# Patient Record
Sex: FEMALE | Race: WHITE | Employment: OTHER | URBAN - NONMETROPOLITAN AREA
[De-identification: names, ages, dates, MRNs, and addresses within clinical notes are randomized per-mention and may not be internally consistent; named-entity substitution may affect disease eponyms.]

---

## 2017-01-14 DIAGNOSIS — R60.0 LOCALIZED EDEMA: ICD-10-CM

## 2017-01-14 DIAGNOSIS — I10 HYPERTENSION GOAL BP (BLOOD PRESSURE) < 140/90: ICD-10-CM

## 2017-01-14 DIAGNOSIS — I48.91 ATRIAL FIBRILLATION, UNSPECIFIED TYPE (H): Primary | ICD-10-CM

## 2017-01-16 RX ORDER — RIVAROXABAN 20 MG/1
TABLET, FILM COATED ORAL
Qty: 90 TABLET | Refills: 1 | Status: SHIPPED | OUTPATIENT
Start: 2017-01-16 | End: 2017-02-06

## 2017-01-16 RX ORDER — FUROSEMIDE 20 MG
TABLET ORAL
Qty: 90 TABLET | Refills: 1 | Status: SHIPPED | OUTPATIENT
Start: 2017-01-16 | End: 2017-04-07

## 2017-01-16 NOTE — TELEPHONE ENCOUNTER
Lasix  Last Written Prescription Date: 1/20/16  Last Fill Quantity: 90, # refills: 3  Last Office Visit with [facility]  Health prescribing provider: 9/28/16   Next 5 appointments (look out 90 days)     Feb 06, 2017  3:30 PM   [facility]   [facility]    [address] [place] [address]   [phone]                   POTASSIUM   Date Value Ref Range Status   10/06/2016 4.0 3.4 - 5.3 mmol/L Final     CREATININE   Date Value Ref Range Status   10/06/2016 0.96 0.52 - 1.04 mg/dL Final     BP Readings from Last 3 Encounters:   10/06/16 111/67   09/28/16 138/74   07/08/16 134/70     Xarelto           Last Written Prescription Date: 1/20/16  Last Fill Quantity: 90, # refills: 3    Last Office Visit with [facility]  [facility] prescribing provider:  9/28/16   Future Office Visit:    Next 5 appointments (look out 90 days)     Feb 06, 2017  3:30 PM   [facility]abil [facility]   [facility]    [address] [place] [address]   [phone]                   WBC      5.4   10/6/2016  RBC     4.63   10/6/2016  HGB     14.4   10/6/2016  HCT     43.5   10/6/2016  No components found with this name: mct  MCV       94   10/6/2016  MCH     31.1   10/6/2016  MCHC     33.1   10/6/2016  RDW     13.6   10/6/2016  PLT      146   10/6/2016  AST       17   10/6/2016  ALT       24   10/6/2016  CREATININE   Date Value Ref Range Status   10/06/2016 0.96 0.52 - 1.04 mg/dL Final

## 2017-01-16 NOTE — TELEPHONE ENCOUNTER
Lasix  Prescription approved per [facility] Refill Protocol.    Xarelto  Routing refill request to provider for review/approval because:  Labs out of range:  CBC    Branide Deutsch RN  [facility]

## 2017-01-23 ENCOUNTER — TELEPHONE (OUTPATIENT)
Dept: FAMILY MEDICINE | Facility: OTHER | Age: 77
End: 2017-01-23

## 2017-01-23 DIAGNOSIS — I48.0 PAROXYSMAL ATRIAL FIBRILLATION (H): Primary | ICD-10-CM

## 2017-01-23 DIAGNOSIS — Z79.01 LONG TERM CURRENT USE OF ANTICOAGULANT THERAPY: ICD-10-CM

## 2017-01-23 RX ORDER — WARFARIN SODIUM 5 MG/1
5 TABLET ORAL DAILY
Qty: 30 TABLET | Refills: 0 | Status: SHIPPED | OUTPATIENT
Start: 2017-01-23 | End: 2017-05-09

## 2017-01-23 NOTE — TELEPHONE ENCOUNTER
Reason for Call:  Medication or medication refill:    Do you use a [facility]?  Name of the pharmacy and phone number for the current request:  [facility], phone # [phone]    Name of the medication requested: Generic for Zeralto or a substitute    Other request: Pt states she's going to [facility] now you need a 90 day prescription. Can you prescribe this? Pt states that the Zeralto is too expensive. Please call and advise. Pt has 10 left. Pt states her  orders through [facility] so you should have the address.     Can we leave a detailed message on this number? YES    Phone number patient can be reached at: Home number on file [phone] (home)    Best Time: anytime    Call taken on 1/23/2017 at 12:37 PM by Sujatha Shi

## 2017-01-23 NOTE — TELEPHONE ENCOUNTER
I spoke with Dr Tilley and he states that Elvia should go off of her Xeralto 3 days prior to starting her coumadin. I have spoken to Elvia and she will see me for a consult on Friday. I have advised her to take her last dose of Xeralto today and then we will have her start the coumadin on Friday. Patient verbalized understanding and agrees with plan of care. Pt had no further questions or concerns at this time.

## 2017-01-27 ENCOUNTER — ANTICOAGULATION THERAPY VISIT (OUTPATIENT)
Dept: ANTICOAGULATION | Facility: OTHER | Age: 77
End: 2017-01-27
Payer: COMMERCIAL

## 2017-01-27 DIAGNOSIS — Z79.01 LONG-TERM (CURRENT) USE OF ANTICOAGULANTS: ICD-10-CM

## 2017-01-27 DIAGNOSIS — Z79.01 LONG TERM CURRENT USE OF ANTICOAGULANT THERAPY: ICD-10-CM

## 2017-01-27 DIAGNOSIS — I48.91 ATRIAL FIBRILLATION, UNSPECIFIED TYPE (H): Primary | ICD-10-CM

## 2017-01-27 PROCEDURE — 99207 ZZC NO CHARGE NURSE ONLY: CPT

## 2017-01-27 RX ORDER — WARFARIN SODIUM 5 MG/1
5 TABLET ORAL DAILY
Qty: 90 TABLET | Refills: 0 | Status: SHIPPED | OUTPATIENT
Start: 2017-01-27 | End: 2017-04-06

## 2017-01-27 NOTE — MR AVS SNAPSHOT
Elvia Amaro   1/27/2017 1:00 PM   Anticoagulation Therapy Visit    Description:  76 year old female   Provider:  [facility]   Department:  [facility]           INR as of 1/27/2017     Selected INR No new INR was available at the time of this encounter.      Anticoagulation Summary as of 1/27/2017     INR goal 2.0-3.0   Selected INR No new INR was available at the time of this encounter.   Full instructions 1/27: 5 mg; 1/28: 5 mg; 1/29: 5 mg; Otherwise No maintenance plan   Next INR check 1/30/2017    Indications   Long-term (current) use of anticoagulants [Z79.01] [Z79.01]  Atrial fibrillation (H) [I48.91]         Your next Anticoagulation Clinic appointment(s)     Jan 30, 2017  9:00 AM   [facility]agulation Visit with [facility]   [facility]    [address] [facility] [address]   [phone]              Contact Numbers     Clinic Number:         January 2017 Details    Sun Mon Tue Wed Thu Fri Sat     1               2               3               4               5               6               7                 8               9               10               11               12               13               14                 15               16               17               18               19               20               21                 22               23               24               25               26               27      5 mg   See details      28      5 mg           29      5 mg         30            31                    Date Details   01/27 This INR check       Date of next INR:  1/30/2017         How to take your warfarin dose     To take:  5 mg Take 1 of the 5 mg tablets.

## 2017-01-27 NOTE — PROGRESS NOTES
ANTICOAGULATION INITIAL CLINIC VISIT    Patient Name:  Elvia Amaro  Date:  1/27/2017  Referred by: Dr Tej Tilley MD  Contact Type:  Face to Face    SUBJECTIVE:  Coumadin education was completed today.  Topics covered include:  -Introduction to coumadin  -Proper Administration  -INR Testing  -Sign/Symptoms of Bleeding  -Signs/Symptoms of Clot Formation or Stroke  -Dietary Intake of Vitamin K  -Drug Interactions  -Anticoagulation Identification (bracelet, necklace or wallet card)  -Future Surgery  -Effects of Alcohol, Tobacco, and Exercise on Coumadin    Coumadin Education Booklet and Coumadin Identification Wallet Card were given to the patient.       Patient Findings     Positives Initiation of therapy    Comments Education given. Pt stopped her Xeralto on Monday night and will start Coumadin today and we will check her INR on Monday.          OBJECTIVE    INR   Date Value Ref Range Status   12/02/2015 1.18* 0.86 - 1.14 Final       ASSESSMENT / PLAN  INR assessment SUB    Recheck INR In: 3 DAYS    INR Location Clinic      Anticoagulation Summary as of 1/27/2017     INR goal 2.0-3.0   Selected INR No new INR was available at the time of this encounter.   Maintenance plan No maintenance plan   Full instructions 1/27: 5 mg; 1/28: 5 mg; 1/29: 5 mg; Otherwise No maintenance plan   Next INR check 1/30/2017   Target end date     Indications   Long-term (current) use of anticoagulants [Z79.01] [Z79.01]  Atrial fibrillation (H) [I48.91]         Anticoagulation Episode Summary     INR check location     Preferred lab     Send INR reminders to [facility]    Comments       Anticoagulation Care Providers     Provider Role Specialty Phone number    Tej Tilley MD [facility] [phone]            See the Encounter Report to view Anticoagulation Flowsheet and Dosing Calendar (Go to Encounters tab in chart review, and find the Anticoagulation Therapy Visit)    Dosage adjustment made based on  physician directed care plan.    Campos Carrera RN

## 2017-01-28 DIAGNOSIS — I48.91 ATRIAL FIBRILLATION, UNSPECIFIED TYPE (H): Primary | ICD-10-CM

## 2017-01-30 ENCOUNTER — ANTICOAGULATION THERAPY VISIT (OUTPATIENT)
Dept: ANTICOAGULATION | Facility: OTHER | Age: 77
End: 2017-01-30
Payer: COMMERCIAL

## 2017-01-30 DIAGNOSIS — I48.91 ATRIAL FIBRILLATION, UNSPECIFIED TYPE (H): ICD-10-CM

## 2017-01-30 DIAGNOSIS — Z79.01 LONG-TERM (CURRENT) USE OF ANTICOAGULANTS: Primary | ICD-10-CM

## 2017-01-30 LAB — INR POINT OF CARE: 1.7 (ref 0.86–1.14)

## 2017-01-30 PROCEDURE — 85610 PROTHROMBIN TIME: CPT | Mod: QW

## 2017-01-30 PROCEDURE — 99207 ZZC NO CHARGE NURSE ONLY: CPT

## 2017-01-30 PROCEDURE — 36416 COLLJ CAPILLARY BLOOD SPEC: CPT

## 2017-01-30 NOTE — MR AVS SNAPSHOT
Elvia Aamro   1/30/2017 9:00 AM   Anticoagulation Therapy Visit    Description:  76 year old female   Provider:  [facility]   Department:  [facility]           INR as of 1/30/2017     Selected INR 1.7! (1/30/2017)      Anticoagulation Summary as of 1/30/2017     INR goal 2.0-3.0   Selected INR 1.7! (1/30/2017)   Full instructions 1/30: 2.5 mg; 1/31: 2.5 mg; 2/1: 2.5 mg; Otherwise No maintenance plan   Next INR check 2/2/2017    Indications   Long-term (current) use of anticoagulants [Z79.01] [Z79.01]  Atrial fibrillation (H) [I48.91]         Your next Anticoagulation Clinic appointment(s)     Feb 02, 2017  8:45 AM   [facility]agulation Visit with [facility]   [facility]    [address] [facility] [address]   [phone]              Contact Numbers     Clinic Number:         January 2017 Details    Sun Mon Tue Wed Thu Fri Sat     1               2               3               4               5               6               7                 8               9               10               11               12               13               14                 15               16               17               18               19               20               21                 22               23               24               25               26               27               28                 29               30      2.5 mg   See details      31      2.5 mg              Date Details   01/30 This INR check               How to take your warfarin dose     To take:  2.5 mg Take 0.5 of a 5 mg tablet.           February 2017 Details    Sun Mon Tue Wed Thu Fri Sat        1      2.5 mg         2            3               4                 5               6               7               8               9               10               11                 12               13               14               15               16               17               18                  19               20               21               22               23               24               25                 26               27               28                    Date Details   No additional details    Date of next INR:  2/2/2017         How to take your warfarin dose     To take:  2.5 mg Take 0.5 of a 5 mg tablet.

## 2017-01-30 NOTE — PROGRESS NOTES
ANTICOAGULATION FOLLOW-UP CLINIC VISIT    Patient Name:  Elvia Amaro  Date:  1/30/2017  Contact Type:  Face to Face    SUBJECTIVE:     Patient Findings     Positives Initiation of therapy           OBJECTIVE    INR PROTIME   Date Value Ref Range Status   01/30/2017 1.7* 0.86 - 1.14 Final       ASSESSMENT / PLAN  INR assessment SUB    Recheck INR In: 3 DAYS    INR Location Clinic      Anticoagulation Summary as of 1/30/2017     INR goal 2.0-3.0   Selected INR 1.7! (1/30/2017)   Maintenance plan No maintenance plan   Full instructions 1/30: 2.5 mg; 1/31: 2.5 mg; 2/1: 2.5 mg; Otherwise No maintenance plan   Next INR check 2/2/2017   Target end date     Indications   Long-term (current) use of anticoagulants [Z79.01] [Z79.01]  Atrial fibrillation (H) [I48.91]         Anticoagulation Episode Summary     INR check location     Preferred lab     Send INR reminders to [facility]    Comments       Anticoagulation Care Providers     Provider Role Specialty Phone number    Tej Tilley MD [facility] [phone]            See the Encounter Report to view Anticoagulation Flowsheet and Dosing Calendar (Go to Encounters tab in chart review, and find the Anticoagulation Therapy Visit)    Dosage adjustment made based on physician directed care plan.    Campos Carrera RN

## 2017-02-01 RX ORDER — AMIODARONE HYDROCHLORIDE 200 MG/1
TABLET ORAL
Qty: 90 TABLET | Refills: 1 | Status: SHIPPED | OUTPATIENT
Start: 2017-02-01 | End: 2017-02-06

## 2017-02-02 ENCOUNTER — ANTICOAGULATION THERAPY VISIT (OUTPATIENT)
Dept: ANTICOAGULATION | Facility: OTHER | Age: 77
End: 2017-02-02
Payer: COMMERCIAL

## 2017-02-02 DIAGNOSIS — Z79.01 LONG-TERM (CURRENT) USE OF ANTICOAGULANTS: Primary | ICD-10-CM

## 2017-02-02 DIAGNOSIS — I48.91 ATRIAL FIBRILLATION, UNSPECIFIED TYPE (H): ICD-10-CM

## 2017-02-02 LAB — INR POINT OF CARE: 2.3 (ref 0.86–1.14)

## 2017-02-02 PROCEDURE — 99207 ZZC NO CHARGE NURSE ONLY: CPT

## 2017-02-02 PROCEDURE — 36416 COLLJ CAPILLARY BLOOD SPEC: CPT

## 2017-02-02 PROCEDURE — 85610 PROTHROMBIN TIME: CPT | Mod: QW

## 2017-02-02 NOTE — PROGRESS NOTES
ANTICOAGULATION FOLLOW-UP CLINIC VISIT    Patient Name:  Elvia Amaro  Date:  2/2/2017  Contact Type:  Face to Face    SUBJECTIVE:     Patient Findings     Positives Initiation of therapy, No Problem Findings           OBJECTIVE    INR PROTIME   Date Value Ref Range Status   02/02/2017 2.3* 0.86 - 1.14 Final       ASSESSMENT / PLAN  INR assessment THER    Recheck INR In: 4 DAYS    INR Location Clinic      Anticoagulation Summary as of 2/2/2017     INR goal 2.0-3.0   Selected INR 2.3 (2/2/2017)   Maintenance plan No maintenance plan   Full instructions 2/2: 2.5 mg; 2/3: 5 mg; 2/4: 2.5 mg; 2/5: 5 mg; Otherwise No maintenance plan   Next INR check 2/6/2017   Target end date     Indications   Long-term (current) use of anticoagulants [Z79.01] [Z79.01]  Atrial fibrillation (H) [I48.91]         Anticoagulation Episode Summary     INR check location     Preferred lab     Send INR reminders to [facility]    Comments       Anticoagulation Care Providers     Provider Role Specialty Phone number    Tej Tilley MD [facility] [phone]            See the Encounter Report to view Anticoagulation Flowsheet and Dosing Calendar (Go to Encounters tab in chart review, and find the Anticoagulation Therapy Visit)    Dosage adjustment made based on physician directed care plan.    Tong Parker RN

## 2017-02-02 NOTE — MR AVS SNAPSHOT
Elvia Amaro   2/2/2017 8:45 AM   Anticoagulation Therapy Visit    Description:  76 year old female   Provider:  [facility]   Department:  [facility]           INR as of 2/2/2017     Selected INR 2.3 (2/2/2017)      Anticoagulation Summary as of 2/2/2017     INR goal 2.0-3.0   Selected INR 2.3 (2/2/2017)   Full instructions 2/2: 2.5 mg; 2/3: 5 mg; 2/4: 2.5 mg; 2/5: 5 mg; Otherwise No maintenance plan   Next INR check 2/6/2017    Indications   Long-term (current) use of anticoagulants [Z79.01] [Z79.01]  Atrial fibrillation (H) [I48.91]         Your next Anticoagulation Clinic appointment(s)     Feb 06, 2017  9:15 AM   Anticoagulation Visit with [facility]   [facility]    [address] [facility] [address]   [phone]              Contact Numbers     Clinic Number:         February 2017 Details    Sun Mon Tue Wed Thu Fri Sat        1               2      2.5 mg   See details      3      5 mg         4      2.5 mg           5      5 mg         6            7               8               9               10               11                 12               13               14               15               16               17               18                 19               20               21               22               23               24               25                 26               27               28                    Date Details   02/02 This INR check       Date of next INR:  2/6/2017         How to take your warfarin dose     To take:  2.5 mg Take 0.5 of a 5 mg tablet.    To take:  5 mg Take 1 of the 5 mg tablets.

## 2017-02-06 ENCOUNTER — OFFICE VISIT (OUTPATIENT)
Dept: CARDIOLOGY | Facility: CLINIC | Age: 77
End: 2017-02-06
Payer: COMMERCIAL

## 2017-02-06 ENCOUNTER — ANTICOAGULATION THERAPY VISIT (OUTPATIENT)
Dept: ANTICOAGULATION | Facility: OTHER | Age: 77
End: 2017-02-06
Payer: COMMERCIAL

## 2017-02-06 VITALS
HEART RATE: 64 BPM | SYSTOLIC BLOOD PRESSURE: 146 MMHG | HEIGHT: 66 IN | WEIGHT: 228.1 LBS | OXYGEN SATURATION: 94 % | BODY MASS INDEX: 36.66 KG/M2 | DIASTOLIC BLOOD PRESSURE: 82 MMHG

## 2017-02-06 DIAGNOSIS — Z79.01 LONG-TERM (CURRENT) USE OF ANTICOAGULANTS: Primary | ICD-10-CM

## 2017-02-06 DIAGNOSIS — I48.91 ATRIAL FIBRILLATION, UNSPECIFIED TYPE (H): Primary | ICD-10-CM

## 2017-02-06 DIAGNOSIS — Z79.899 ON AMIODARONE THERAPY: ICD-10-CM

## 2017-02-06 DIAGNOSIS — I42.9 CARDIOMYOPATHY (H): ICD-10-CM

## 2017-02-06 DIAGNOSIS — I48.91 ATRIAL FIBRILLATION, UNSPECIFIED TYPE (H): ICD-10-CM

## 2017-02-06 LAB — INR POINT OF CARE: 3.5 (ref 0.86–1.14)

## 2017-02-06 PROCEDURE — 36416 COLLJ CAPILLARY BLOOD SPEC: CPT

## 2017-02-06 PROCEDURE — 99214 OFFICE O/P EST MOD 30 MIN: CPT | Performed by: INTERNAL MEDICINE

## 2017-02-06 PROCEDURE — 99207 ZZC NO CHARGE NURSE ONLY: CPT

## 2017-02-06 PROCEDURE — 85610 PROTHROMBIN TIME: CPT | Mod: QW

## 2017-02-06 RX ORDER — AMIODARONE HYDROCHLORIDE 200 MG/1
TABLET ORAL
Qty: 90 TABLET | Refills: 3 | Status: SHIPPED | OUTPATIENT
Start: 2017-02-06 | End: 2018-03-05

## 2017-02-06 RX ORDER — CARVEDILOL 12.5 MG/1
TABLET ORAL
Qty: 180 TABLET | Refills: 3 | Status: SHIPPED | OUTPATIENT
Start: 2017-02-06 | End: 2017-11-29

## 2017-02-06 NOTE — PROGRESS NOTES
"Chief Complaint: atrial fibrillation     HPI ([id number]): I was happy to see Mrs. Amaro in consultation at the request of Dr. Tilley for the above problem.    Patient reports that she was first diagnosed with atrial fibrillation recently but has been feeling fatigue and short of breath since mid-July. Prior to that she reprost she as \"fine\". She does not have palpitations. She noticed a marked and sudden increase in dyspnea on exertion. She also noted increased ankle edema.    Her chads score is 4 (2 for age, one for gender, one for hypertension).    She has been started on rivaroxiban for thromboembolic prophylaxis.    Her echocardiogram shows moderate left ventricular systolic dysfunction.  She was started on Coreg for rate control as well as her systolic dysfunction. She was also started on furosemide. She is not yet on an ACE inhibitor. She does not feel the medications have helped very much.    She reports no FH of atrial fibrillation.    [id number]: She felt better for a few days after the cardioversion but then her dyspnea on exertion, shortness of breath and fatigue recurred. She is back in atrial fibrillation today.     [id number]: She has completed her oral amiodarone loading and is scheduled for direct current cardioversion later this morning. She reports taking her rivaroxaban with a meal and has not missed any doses. She reports no changes. She still feels that her shortness of breath is more noticeable than usual. Her ECG shows she is back in sinus rhythm.     [id number]: She reports feeling well. She has not had any palpitations. She does not want to wear another monitor. She reports no bleeding problems on the rivaroxaban. She has not had any focal neurologic symptoms.    [id number] Interval history: She has had no had any atrial fibrillation that she is aware of. She had to switch from rivaroxaban to warfarin due to cost. She has had no bleeding problems. She denies any focal neurologic symptoms. "     Current Outpatient Prescriptions   Medication Sig Dispense Refill     amiodarone (PACERONE/CODARONE) 200 MG tablet TAKE 1 TABLET (200 MG) BY MOUTH DAILY 90 tablet 3     carvedilol (COREG) 12.5 MG tablet TAKE 1 TABLET BY MOUTH TWIC E A DAY WITH MEALS 180 tablet 3     warfarin (COUMADIN) 5 MG tablet Take 1 tablet (5 mg) by mouth daily 90 tablet 0     warfarin (COUMADIN) 5 MG tablet Take 1 tablet (5 mg) by mouth daily 30 tablet 0     furosemide (LASIX) 20 MG tablet TAKE 1 TABLET BY MOUTH EVER Y DAY 90 tablet 1     levothyroxine (SYNTHROID/LEVOTHROID) 100 MCG tablet TAKE 1 TABLET BY MOUTH EVER Y DAY 90 tablet 0     meloxicam (MOBIC) 7.5 MG tablet TAKE 1 TABLET BY MOUTH EVER Y DAY 90 tablet 3     cetirizine (ZYRTEC) 10 MG tablet Take 1 tablet (10 mg) by mouth daily 30 tablet 0     order for DME Equipment being ordered: Elastic Sleeve   Prosthetics Fax [phone] 1 Device 1     Calcium Carbonate-Vitamin D (CALCIUM + D PO) Take  by mouth.       Cholecalciferol (VITAMIN D-1000 MAX ST PO) Take  by mouth.       [DISCONTINUED] amiodarone (PACERONE/CODARONE) 200 MG tablet TAKE 1 TABLET (200 MG) BY MOUTH DAILY 90 tablet 1     [DISCONTINUED] carvedilol (COREG) 12.5 MG tablet TAKE 1 TABLET BY MOUTH TWIC E A DAY WITH MEALS 180 tablet 1     order for DME Equipment being ordered: breast prosthesis and bras 1 Device 0     ACE/ARB NOT PRESCRIBED, INTENTIONAL, 1 each every 15 minutes ACE & ARB not prescribed due to Symptomatic hypotension not due to excessive diuresis         Past Medical History   Diagnosis Date     NONSPECIFIC MEDICAL HISTORY      no active problems     Malignant neoplasm (H)      breast     Hypertension goal BP (blood pressure) < 140/90 3/14/2011     Basal cell carcinoma        Past Surgical History   Procedure Laterality Date     Hc removal of tonsils,<11 y/o       Tonsils <10y.o.     Hc hysteroscopy w endometrial bx/polypectomy w/wo d&c  9/30/2005     Hysteroscopy. D&C. Cervical biopsies.     Mastectomy  " 3/21/11     left mastectomy, lymph node dissection     Laparoscopic hysterectomy total, bilateral salpingo-oophorectomy, combined  3/21/11     Vascular surgery       port     Colonoscopy  8/29/2011     Procedure:COLONOSCOPY; colonoscopy; Surgeon:VICKIE DUFF; Location:[facility]     Biopsy skin (location)       left leg     Remove catheter vascular access  5/4/2011     Procedure:REMOVE CATHETER VASCULAR ACCESS; right subclavian; Surgeon:ESTER ROPER; Location:[facility]     Breast surgery       No history of surgery       Mohs micrographic procedure       Anesthesia cardioversion N/A 9/18/2015     Procedure: ANESTHESIA CARDIOVERSION;  Surgeon: GENERIC ANESTHESIA PROVIDER;  Location: [facility]       Family History   Problem Relation Age of Onset     DIABETES Father      CEREBROVASCULAR DISEASE Father      HEART DISEASE Mother      MI age 68     CANCER No family hx of      no skin cancer       Social History   Substance Use Topics     Smoking status: Never Smoker      Smokeless tobacco: Never Used     Alcohol Use: No     She is  and lives with her . She is retired. She worker as a HS principal's .      Allergies   Allergen Reactions     Bacitracin      Skin Irritation     Cephalexin Hcl Itching     Clindamycin Hcl Itching       ROS: ten system review as in HPI and use of compression sleeve on left arm s/p mastectomy and RT to left chest and axilla. [id number]      Physical Examination:  Vitals: /82 mmHg  Pulse 64  Ht 1.664 m (5' 5.5\")  Wt 103.465 kg (228 lb 1.6 oz)  BMI 37.37 kg/m2  SpO2 94%  BMI= Body mass index is 37.37 kg/(m^2).    GENERAL APPEARANCE: healthy, alert and no distress  HEENT: no icterus, mucosa moist, no cyanosis.  NECK: JVP is difficult to see    CHEST: lungs clear to auscultation, respirations are unlabored, normal respiratory rate  CARDIOVASCULAR: regular rhythm, normal S1,S2 is single, heart sounds are distant, no S3 or S4 and no murmur, click or rub, precordium quiet, " post-surgical changes left chest.  EXTREMITIES: no clubbing or cyanosis, mild pitting edema half way up shins      Laboratory:     Results for CISCO SUMNER (MRN [id number]) as of 2017 15:32   Ref. Range 10/6/2016 12:24   ALT Latest Ref Range: 0-50 U/L 24   AST Latest Ref Range: 0-45 U/L 17     Results for CISCO SUMNER (MRN [id number]) as of 2017 15:32   Ref. Range 2016 14:48   T4 Free Latest Ref Range: 0.76-1.46 ng/dL 1.49 (H)         Previous results:  Results for CISCO SUMNER (MRN [id number]) as of 2015 09:13   Ref. Range 2015 08:23   Sodium Latest Range: 133-144 mmol/L 141   Potassium Latest Range: 3.4-5.3 mmol/L 4.2   Chloride Latest Range:  mmol/L 107   Carbon Dioxide Latest Range: 20-32 mmol/L 29   Urea Nitrogen Latest Range: 7-30 mg/dL 18   Creatinine Latest Range: 0.52-1.04 mg/dL 0.99   GFR Estimate Latest Range: >60 mL/min/1.7m2 54 (L)   GFR Estimate If Black Latest Range: >60 mL/min/1.7m2 66   Calcium Latest Range: 8.5-10.1 mg/dL 9.0   Anion Gap Latest Range: 3-14 mmol/L 5   Magnesium Latest Range: 1.6-2.3 mg/dL 2.2   Albumin Latest Range: 3.4-5.0 g/dL 3.6   Protein Total Latest Range: 6.8-8.8 g/dL 6.8   Bilirubin Total Latest Range: 0.2-1.3 mg/dL 0.6   Alkaline Phosphatase Latest Range:  U/L 103   ALT Latest Range: 0-50 U/L 27   AST Latest Range: 0-45 U/L 19   Glucose Latest Range: 60-99 mg/dL 95   INR Latest Range: 0.86-1.14  1.18 (H)       Previous studies:  [facility]  Echocardiography Laboratory  [address] [place] [address]      Name: CISCO SUMNER  MRN: [id number]  : 1940  Study Date: 2015 11:11 AM  Age: 75 yrs  Gender: Female  Patient Location: [facility]  Reason For Study: Atrial fibrillation- RVR  History: HTN,Breast Cancer  Ordering Physician: JB DIAZ  Referring Physician: JB DIAZ  Performed By: Mona Reyes    BSA: 2.1 m2  Height: 65 in  Weight: 222 lb  HR: 120  BP:  132/84 mmHg  ______________________________________________________________________________      Procedure  Complete Echo Adult. Contrast Optison.  ______________________________________________________________________________    Interpretation Summary    The left ventricle is normal in size.  There is mild-moderate global hypokinesia of the left ventricle.  The visual ejection fraction is estimated at 40-45%.  There is mild concentric left ventricular hypertrophy.  There is mild (1+) mitral regurgitation.  There is mild (1+) tricuspid regurgitation.  The rhythm was rapid atrial fibrillation.  ______________________________________________________________________________        Left Ventricle  The left ventricle is normal in size. There is mild concentric left  ventricular hypertrophy. The visual ejection fraction is estimated at 40-45%.  There is mild-moderate global hypokinesia of the left ventricle.    Right Ventricle  The right ventricle is normal in size and function.  Atria  The left atrium is moderately dilated. The right atrium is mildly dilated.    Mitral Valve  There is mild (1+) mitral regurgitation.    Tricuspid Valve  There is mild (1+) tricuspid regurgitation. Right ventricle systolic pressure  estimate normal. The right ventricular systolic pressure is approximated at  23.7 mmHg plus the right atrial pressure.    Aortic Valve  Normal tricuspid aortic valve. No aortic regurgitation is present. No aortic  stenosis is present.    Pulmonic Valve  There is trace pulmonic valvular regurgitation.    Vessels  Normal size aorta. The aortic root is normal size.  Pericardial/Pleural  There is no pericardial effusion.    Rhythm  The rhythm was rapid atrial fibrillation.    ______________________________________________________________________________  MMode/2D Measurements & Calculations  IVSd: 1.3 cm  LVIDd: 3.8 cm  LVIDs: 3.0 cm  LVPWd: 1.2 cm  FS: 20.2 %  EDV(Teich): 60.1 ml  ESV(Teich): 34.9 ml  LV mass(C)d:  166.1 grams  Ao root diam: 3.3 cm  LA dimension: 3.8 cm  LA/Ao: 1.2  LA Volume (BP): 71.9 ml    LA Volume Index (BP): 34.7 ml/m2      Doppler Measurements & Calculations  MV E max naheed: 112.0 cm/sec  TR max naheed: 242.4 cm/sec  TR max P.7 mmHg  Lateral E/e': 13.9  Medial E/e': 20.0    ______________________________________________________________________________      Report approved by: Zoraida Chandler 2015 02:02 PM      Assessment and recommendations:    1) Persistent atrial fibrillation, recurrent- maintaining sinus    - amio labs ordered      2) Left ventricular systolic dysfunction - mild ankle edema but no other physical exam signs of heart failure. She reports no change in her exercise ability. She does her own housework and exercises 3x/week      - unable to increase meds due to low BP  - continue carvedilol  - follow-up echocardiogram ordered    3) Sinus bradycardia - no complaints of exercise intolerance, will follow clinically    I appreciate the chance to help with Mrs. Amaro's care. Please let me know if you have any questions or concerns.    Nabil Kerr MD    CC Tej Tilley (General)

## 2017-02-06 NOTE — PROGRESS NOTES
ANTICOAGULATION FOLLOW-UP CLINIC VISIT    Patient Name:  Elvia Amaro  Date:  2/6/2017  Contact Type:  Face to Face    SUBJECTIVE:     Patient Findings     Positives Initiation of therapy, No Problem Findings    Comments Pt has been limiting greens in diet, planning to increase greens before next INR check.           OBJECTIVE    INR PROTIME   Date Value Ref Range Status   02/06/2017 3.5* 0.86 - 1.14 Final       ASSESSMENT / PLAN  INR assessment SUPRA    Recheck INR In: 4 DAYS    INR Location Clinic      Anticoagulation Summary as of 2/6/2017     INR goal 2.0-3.0   Selected INR 3.5! (2/6/2017)   Maintenance plan No maintenance plan   Full instructions 2/6: 2.5 mg; 2/7: 2.5 mg; 2/8: 2.5 mg; 2/9: 2.5 mg; Otherwise No maintenance plan   Next INR check 2/10/2017   Target end date     Indications   Long-term (current) use of anticoagulants [Z79.01] [Z79.01]  Atrial fibrillation (H) [I48.91]         Anticoagulation Episode Summary     INR check location     Preferred lab     Send INR reminders to [facility]    Comments       Anticoagulation Care Providers     Provider Role Specialty Phone number    Tej Tilley MD [facility] [phone]            See the Encounter Report to view Anticoagulation Flowsheet and Dosing Calendar (Go to Encounters tab in chart review, and find the Anticoagulation Therapy Visit)    Dosage adjustment made based on physician directed care plan.    Tong Parker RN

## 2017-02-06 NOTE — MR AVS SNAPSHOT
After Visit Summary   2/6/2017    Elvia Amaro    MRN: [id number]           Patient Information     Date Of Birth          1940        Visit Information        Provider Department      2/6/2017 3:30 PM Nabil Kerr MD [facility]        Today's Diagnoses     Atrial fibrillation, unspecified type (H)    -  1     Cardiomyopathy (H)         On amiodarone therapy            Follow-ups after your visit        Additional Services     Follow-Up with Cardiac Advanced Practice Provider           Follow-Up with Electrophysiologist                 Your next 10 appointments already scheduled     Feb 10, 2017  9:00 AM   [facility]ticoagulation [facility] with  [facility]   [facility]    [address] [facility] [id number] [phone]            Apr 06, 2017 12:00 PM   Lab with  LAB   [facility] ([facility])    [address]  1st [address]  [facility] [id number] [phone]            Apr 06, 2017 12:30 PM   (Arrive by 12:15 PM)   Return Visit with Elijah Padilla MD   [facility] ([facility])    [address]  [address]nd [address]  [facility] [id number] [phone]              Future tests that were ordered for you today     Open Future Orders        Priority Expected Expires Ordered    Echocardiogram Routine 2/13/2017 2/6/2018 2/6/2017    Follow-Up with Electrophysiologist Routine 2/6/2018 6/21/2018 2/6/2017    Follow-Up with Cardiac Advanced Practice Provider Routine 8/5/2017 2/6/2018 2/6/2017    Basic metabolic panel Routine 8/5/2017 2/6/2018 2/6/2017    Pulmonary function test Routine 8/5/2017 2/6/2018 2/6/2017    XR Chest 1 View Routine 8/5/2017 2/6/2018 2/6/2017    Hepatic panel Routine 8/5/2017 2/6/2018 2/6/2017    TSH Routine 8/5/2017 2/6/2018 2/6/2017            Who to contact     If you have questions or need follow up information about today's  "clinic visit or your schedule please contact [facility] directly at [phone].  Normal or non-critical lab and imaging results will be communicated to you by MyChart, letter or phone within 4 business days after the clinic has received the results. If you do not hear from us within 7 days, please contact the clinic through [employer] or phone. If you have a critical or abnormal lab result, we will notify you by phone as soon as possible.  Submit refill requests through [facility] or call your pharmacy and they will forward the refill request to us. Please allow 3 business days for your refill to be completed.          Additional Information About Your Visit        MyChart Information     [facility]dr lets you send messages to your doctor, view your test results, renew your prescriptions, schedule appointments and more. To sign up, go to [email or web address] . Click on \"Log in\" on the left side of the screen, which will take you to the Welcome page. Then click on \"Sign up Now\" on the right side of the page.     You will be asked to enter the access code listed below, as well as some personal information. Please follow the directions to create your username and password.     Your access code is: [email or web address]  Expires: 2017  3:56 PM     Your access code will  in 90 days. If you need help or a new code, please call your [place] clinic or [phone].        Care EveryWhere ID     This is your Care EveryWhere ID. This could be used by other organizations to access your [place] medical records  [id number]        Your Vitals Were     Pulse Height BMI (Body Mass Index) Pulse Oximetry          64 1.664 m (5' 5.5\") 37.37 kg/m2 94%         Blood Pressure from Last 3 Encounters:   17 146/82   10/06/16 111/67   16 138/74    Weight from Last 3 Encounters:   17 103.465 kg (228 lb 1.6 oz)   10/06/16 105.008 kg (231 lb 8 oz)   16 104.736 kg (230 lb 14.4 oz)                 Today's " Medication Changes          These changes are accurate as of: 2/6/17  3:56 PM.  If you have any questions, ask your nurse or doctor.               These medicines have changed or have updated prescriptions.        Dose/Directions    amiodarone 200 MG tablet   Commonly known as:  PACERONE/CODARONE   This may have changed:  See the new instructions.   Used for:  Atrial fibrillation, unspecified type (H)   Changed by:  Nabil Kerr MD        TAKE 1 TABLET (200 MG) BY MOUTH DAILY   Quantity:  90 tablet   Refills:  3       carvedilol 12.5 MG tablet   Commonly known as:  COREG   This may have changed:  See the new instructions.   Used for:  Cardiomyopathy (H)   Changed by:  Nabil Kerr MD        TAKE 1 TABLET BY MOUTH TWIC E A DAY WITH MEALS   Quantity:  180 tablet   Refills:  3         Stop taking these medicines if you haven't already. Please contact your care team if you have questions.     XARELTO 20 MG Tabs tablet   Generic drug:  rivaroxaban ANTICOAGULANT   Stopped by:  Nabil Kerr MD                Where to get your medicines      These medications were sent to [facility] - [facility] [address]  [address], [facility] [address]     Phone:  [phone]    - amiodarone 200 MG tablet  - carvedilol 12.5 MG tablet             Primary Care Provider Office Phone # Fax #    Tej Tilley [phone]       [facility] [address] [facility] [address]        Thank you!     Thank you for choosing [facility]  for your care. Our goal is always to provide you with excellent care. Hearing back from our patients is one way we can continue to improve our services. Please take a few minutes to complete the written survey that you may receive in the mail after your visit with us. Thank you!             Your Updated Medication List - Protect others around you: Learn how to safely use, store and throw away your medicines  at www.disposemymeds.org.          This list is accurate as of: 2/6/17  3:56 PM.  Always use your most recent med list.                   Brand Name Dispense Instructions for use    ACE/ARB NOT PRESCRIBED (INTENTIONAL)      1 each every 15 minutes ACE & ARB not prescribed due to Symptomatic hypotension not due to excessive diuresis       amiodarone 200 MG tablet    PACERONE/CODARONE    90 tablet    TAKE 1 TABLET (200 MG) BY MOUTH DAILY       CALCIUM + D PO      Take  by mouth.       carvedilol 12.5 MG tablet    COREG    180 tablet    TAKE 1 TABLET BY MOUTH TWIC E A DAY WITH MEALS       cetirizine 10 MG tablet    zyrTEC    30 tablet    Take 1 tablet (10 mg) by mouth daily       furosemide 20 MG tablet    LASIX    90 tablet    TAKE 1 TABLET BY MOUTH EVER Y DAY       levothyroxine 100 MCG tablet    SYNTHROID/LEVOTHROID    90 tablet    TAKE 1 TABLET BY MOUTH EVER Y DAY       meloxicam 7.5 MG tablet    MOBIC    90 tablet    TAKE 1 TABLET BY MOUTH EVER Y DAY       * order for DME     1 Device    Equipment being ordered: Elastic Sleeve  Prosthetics Fax [phone]       * order for DME     1 Device    Equipment being ordered: breast prosthesis and bras       VITAMIN D-1000 MAX ST PO      Take  by mouth.       * warfarin 5 MG tablet    COUMADIN    30 tablet    Take 1 tablet (5 mg) by mouth daily       * warfarin 5 MG tablet    COUMADIN    90 tablet    Take 1 tablet (5 mg) by mouth daily       * Notice:  This list has 4 medication(s) that are the same as other medications prescribed for you. Read the directions carefully, and ask your doctor or other care provider to review them with you.

## 2017-02-06 NOTE — Clinical Note
"2/6/2017      RE: Elvia Amaro  [address] [facility] [address]       Dear Colleague,    Thank you for the opportunity to participate in the care of your patient, Elvia Amaro, at the [facility] at [facility]. Please see a copy of my visit note below.    Chief Complaint: atrial fibrillation     HPI ([id number]): I was happy to see Mrs. Amaro in consultation at the request of Dr. Tilley for the above problem.    Patient reports that she was first diagnosed with atrial fibrillation recently but has been feeling fatigue and short of breath since mid-July. Prior to that she reprost she as \"fine\". She does not have palpitations. She noticed a marked and sudden increase in dyspnea on exertion. She also noted increased ankle edema.    Her chads score is 4 (2 for age, one for gender, one for hypertension).    She has been started on rivaroxiban for thromboembolic prophylaxis.    Her echocardiogram shows moderate left ventricular systolic dysfunction.  She was started on Coreg for rate control as well as her systolic dysfunction. She was also started on furosemide. She is not yet on an ACE inhibitor. She does not feel the medications have helped very much.    She reports no FH of atrial fibrillation.    [id number]: She felt better for a few days after the cardioversion but then her dyspnea on exertion, shortness of breath and fatigue recurred. She is back in atrial fibrillation today.     [id number]: She has completed her oral amiodarone loading and is scheduled for direct current cardioversion later this morning. She reports taking her rivaroxaban with a meal and has not missed any doses. She reports no changes. She still feels that her shortness of breath is more noticeable than usual. Her ECG shows she is back in sinus rhythm.     [id number]: She reports feeling well. She has not had any palpitations. She does not want to wear another monitor. She reports " no bleeding problems on the rivaroxaban. She has not had any focal neurologic symptoms.    [id number] Interval history: She has had no had any atrial fibrillation that she is aware of. She had to switch from rivaroxaban to warfarin due to cost. She has had no bleeding problems. She denies any focal neurologic symptoms.     Current Outpatient Prescriptions   Medication Sig Dispense Refill     amiodarone (PACERONE/CODARONE) 200 MG tablet TAKE 1 TABLET (200 MG) BY MOUTH DAILY 90 tablet 3     carvedilol (COREG) 12.5 MG tablet TAKE 1 TABLET BY MOUTH TWIC E A DAY WITH MEALS 180 tablet 3     warfarin (COUMADIN) 5 MG tablet Take 1 tablet (5 mg) by mouth daily 90 tablet 0     warfarin (COUMADIN) 5 MG tablet Take 1 tablet (5 mg) by mouth daily 30 tablet 0     furosemide (LASIX) 20 MG tablet TAKE 1 TABLET BY MOUTH EVER Y DAY 90 tablet 1     levothyroxine (SYNTHROID/LEVOTHROID) 100 MCG tablet TAKE 1 TABLET BY MOUTH EVER Y DAY 90 tablet 0     meloxicam (MOBIC) 7.5 MG tablet TAKE 1 TABLET BY MOUTH EVER Y DAY 90 tablet 3     cetirizine (ZYRTEC) 10 MG tablet Take 1 tablet (10 mg) by mouth daily 30 tablet 0     order for DME Equipment being ordered: Elastic Sleeve   Prosthetics Fax [phone] 1 Device 1     Calcium Carbonate-Vitamin D (CALCIUM + D PO) Take  by mouth.       Cholecalciferol (VITAMIN D-1000 MAX ST PO) Take  by mouth.       [DISCONTINUED] amiodarone (PACERONE/CODARONE) 200 MG tablet TAKE 1 TABLET (200 MG) BY MOUTH DAILY 90 tablet 1     [DISCONTINUED] carvedilol (COREG) 12.5 MG tablet TAKE 1 TABLET BY MOUTH TWIC E A DAY WITH MEALS 180 tablet 1     order for DME Equipment being ordered: breast prosthesis and bras 1 Device 0     ACE/ARB NOT PRESCRIBED, INTENTIONAL, 1 each every 15 minutes ACE & ARB not prescribed due to Symptomatic hypotension not due to excessive diuresis         Past Medical History   Diagnosis Date     NONSPECIFIC MEDICAL HISTORY      no active problems     Malignant neoplasm (H)      breast      "Hypertension goal BP (blood pressure) < 140/90 3/14/2011     Basal cell carcinoma        Past Surgical History   Procedure Laterality Date     Hc removal of tonsils,<9 y/o       Tonsils <10y.o.     Hc hysteroscopy w endometrial bx/polypectomy w/wo d&c  9/30/2005     Hysteroscopy. D&C. Cervical biopsies.     Mastectomy  3/21/11     left mastectomy, lymph node dissection     Laparoscopic hysterectomy total, bilateral salpingo-oophorectomy, combined  3/21/11     Vascular surgery       port     Colonoscopy  8/29/2011     Procedure:COLONOSCOPY; colonoscopy; Surgeon:VICKIE DUFF; Location:[facility]     Biopsy skin (location)       left leg     Remove catheter vascular access  5/4/2011     Procedure:REMOVE CATHETER VASCULAR ACCESS; right subclavian; Surgeon:ESTER ROPER; Location:[facility]     Breast surgery       No history of surgery       Mohs micrographic procedure       Anesthesia cardioversion N/A 9/18/2015     Procedure: ANESTHESIA CARDIOVERSION;  Surgeon: GENERIC ANESTHESIA PROVIDER;  Location: [facility] OR       Family History   Problem Relation Age of Onset     DIABETES Father      CEREBROVASCULAR DISEASE Father      HEART DISEASE Mother      MI age 68     CANCER No family hx of      no skin cancer       Social History   Substance Use Topics     Smoking status: Never Smoker      Smokeless tobacco: Never Used     Alcohol Use: No     She is  and lives with her . She is retired. She worker as a HS principal's .      Allergies   Allergen Reactions     Bacitracin      Skin Irritation     Cephalexin Hcl Itching     Clindamycin Hcl Itching       ROS: ten system review as in HPI and use of compression sleeve on left arm s/p mastectomy and RT to left chest and axilla. [id number]      Physical Examination:  Vitals: /82 mmHg  Pulse 64  Ht 1.664 m (5' 5.5\")  Wt 103.465 kg (228 lb 1.6 oz)  BMI 37.37 kg/m2  SpO2 94%  BMI= Body mass index is 37.37 kg/(m^2).    GENERAL APPEARANCE: healthy, alert and no " distress  HEENT: no icterus, mucosa moist, no cyanosis.  NECK: JVP is difficult to see    CHEST: lungs clear to auscultation, respirations are unlabored, normal respiratory rate  CARDIOVASCULAR: regular rhythm, normal S1,S2 is single, heart sounds are distant, no S3 or S4 and no murmur, click or rub, precordium quiet, post-surgical changes left chest.  EXTREMITIES: no clubbing or cyanosis, mild pitting edema half way up shins      Laboratory:     Results for CISCO SUMNER (MRN [id number]) as of 2017 15:32   Ref. Range 10/6/2016 12:24   ALT Latest Ref Range: 0-50 U/L 24   AST Latest Ref Range: 0-45 U/L 17     Results for CISCO SUMNER (MRN [id number]) as of 2017 15:32   Ref. Range 2016 14:48   T4 Free Latest Ref Range: 0.76-1.46 ng/dL 1.49 (H)         Previous results:  Results for CISCO SUMNER (MRN [id number]) as of 2015 09:13   Ref. Range 2015 08:23   Sodium Latest Range: 133-144 mmol/L 141   Potassium Latest Range: 3.4-5.3 mmol/L 4.2   Chloride Latest Range:  mmol/L 107   Carbon Dioxide Latest Range: 20-32 mmol/L 29   Urea Nitrogen Latest Range: 7-30 mg/dL 18   Creatinine Latest Range: 0.52-1.04 mg/dL 0.99   GFR Estimate Latest Range: >60 mL/min/1.7m2 54 (L)   GFR Estimate If Black Latest Range: >60 mL/min/1.7m2 66   Calcium Latest Range: 8.5-10.1 mg/dL 9.0   Anion Gap Latest Range: 3-14 mmol/L 5   Magnesium Latest Range: 1.6-2.3 mg/dL 2.2   Albumin Latest Range: 3.4-5.0 g/dL 3.6   Protein Total Latest Range: 6.8-8.8 g/dL 6.8   Bilirubin Total Latest Range: 0.2-1.3 mg/dL 0.6   Alkaline Phosphatase Latest Range:  U/L 103   ALT Latest Range: 0-50 U/L 27   AST Latest Range: 0-45 U/L 19   Glucose Latest Range: 60-99 mg/dL 95   INR Latest Range: 0.86-1.14  1.18 (H)       Previous studies:  [facility]  Echocardiography Laboratory  [address] [place] [address]      Name: CISCO SUMNER  MRN: [id number]  : 1940  Study Date:  09/09/2015 11:11 AM  Age: 75 yrs  Gender: Female  Patient Location: [facility]  Reason For Study: Atrial fibrillation- RVR  History: HTN,Breast Cancer  Ordering Physician: JB DIAZ  Referring Physician: JB DIAZ  Performed By: Mona Reyes    BSA: 2.1 m2  Height: 65 in  Weight: 222 lb  HR: 120  BP: 132/84 mmHg  ______________________________________________________________________________      Procedure  Complete Echo Adult. Contrast Optison.  ______________________________________________________________________________    Interpretation Summary    The left ventricle is normal in size.  There is mild-moderate global hypokinesia of the left ventricle.  The visual ejection fraction is estimated at 40-45%.  There is mild concentric left ventricular hypertrophy.  There is mild (1+) mitral regurgitation.  There is mild (1+) tricuspid regurgitation.  The rhythm was rapid atrial fibrillation.  ______________________________________________________________________________        Left Ventricle  The left ventricle is normal in size. There is mild concentric left  ventricular hypertrophy. The visual ejection fraction is estimated at 40-45%.  There is mild-moderate global hypokinesia of the left ventricle.    Right Ventricle  The right ventricle is normal in size and function.  Atria  The left atrium is moderately dilated. The right atrium is mildly dilated.    Mitral Valve  There is mild (1+) mitral regurgitation.    Tricuspid Valve  There is mild (1+) tricuspid regurgitation. Right ventricle systolic pressure  estimate normal. The right ventricular systolic pressure is approximated at  23.7 mmHg plus the right atrial pressure.    Aortic Valve  Normal tricuspid aortic valve. No aortic regurgitation is present. No aortic  stenosis is present.    Pulmonic Valve  There is trace pulmonic valvular regurgitation.    Vessels  Normal size aorta. The aortic root is normal size.  Pericardial/Pleural  There is no  pericardial effusion.    Rhythm  The rhythm was rapid atrial fibrillation.    ______________________________________________________________________________  MMode/2D Measurements & Calculations  IVSd: 1.3 cm  LVIDd: 3.8 cm  LVIDs: 3.0 cm  LVPWd: 1.2 cm  FS: 20.2 %  EDV(Teich): 60.1 ml  ESV(Teich): 34.9 ml  LV mass(C)d: 166.1 grams  Ao root diam: 3.3 cm  LA dimension: 3.8 cm  LA/Ao: 1.2  LA Volume (BP): 71.9 ml    LA Volume Index (BP): 34.7 ml/m2      Doppler Measurements & Calculations  MV E max naheed: 112.0 cm/sec  TR max naheed: 242.4 cm/sec  TR max P.7 mmHg  Lateral E/e': 13.9  Medial E/e': 20.0    ______________________________________________________________________________      Report approved by: Zoraida Chandler 2015 02:02 PM      Assessment and recommendations:    1) Persistent atrial fibrillation, recurrent- maintaining sinus    - amio labs ordered      2) Left ventricular systolic dysfunction - mild ankle edema but no other physical exam signs of heart failure. She reports no change in her exercise ability. She does her own housework and exercises 3x/week      - unable to increase meds due to low BP  - continue carvedilol  - follow-up echocardiogram ordered    3) Sinus bradycardia - no complaints of exercise intolerance, will follow clinically    I appreciate the chance to help with Mrs. Amaro's care. Please let me know if you have any questions or concerns.    Nabil Kerr MD    CC Tej Tilley (General)

## 2017-02-06 NOTE — MR AVS SNAPSHOT
Elvia Amaro   2/6/2017 9:15 AM   Anticoagulation Therapy Visit    Description:  76 year old female   Provider:  STACIE ANTI PAZ   Department:  [facility]           INR as of 2/6/2017     Selected INR 3.5! (2/6/2017)      Anticoagulation Summary as of 2/6/2017     INR goal 2.0-3.0   Selected INR 3.5! (2/6/2017)   Full instructions 2/6: 2.5 mg; 2/7: 2.5 mg; 2/8: 2.5 mg; 2/9: 2.5 mg; Otherwise No maintenance plan   Next INR check 2/10/2017    Indications   Long-term (current) use of anticoagulants [Z79.01] [Z79.01]  Atrial fibrillation (H) [I48.91]         Your next Anticoagulation Clinic appointment(s)     Feb 10, 2017  9:00 AM   [facility] with [facility]   [facility]    [address] [facility] [id number] [phone]              Contact Numbers     Clinic Number:         February 2017 Details    Sun Mon Tue Wed Thu Fri Sat        1               2               3               4                 5               6      2.5 mg   See details      7      2.5 mg         8      2.5 mg         9      2.5 mg         10            11                 12               13               14               15               16               17               18                 19               20               21               22               23               24               25                 26               27               28                    Date Details   02/06 This INR check       Date of next INR:  2/10/2017         How to take your warfarin dose     To take:  2.5 mg Take 0.5 of a 5 mg tablet.

## 2017-02-10 ENCOUNTER — ANTICOAGULATION THERAPY VISIT (OUTPATIENT)
Dept: ANTICOAGULATION | Facility: OTHER | Age: 77
End: 2017-02-10
Payer: COMMERCIAL

## 2017-02-10 DIAGNOSIS — I48.91 ATRIAL FIBRILLATION, UNSPECIFIED TYPE (H): ICD-10-CM

## 2017-02-10 DIAGNOSIS — Z79.01 LONG-TERM (CURRENT) USE OF ANTICOAGULANTS: Primary | ICD-10-CM

## 2017-02-10 LAB — INR POINT OF CARE: 4.8 (ref 0.86–1.14)

## 2017-02-10 PROCEDURE — 99207 ZZC NO CHARGE NURSE ONLY: CPT

## 2017-02-10 PROCEDURE — 36416 COLLJ CAPILLARY BLOOD SPEC: CPT

## 2017-02-10 PROCEDURE — 85610 PROTHROMBIN TIME: CPT | Mod: QW

## 2017-02-10 NOTE — MR AVS SNAPSHOT
Elvia Amaro   2/10/2017 9:00 AM   Anticoagulation Therapy Visit    Description:  76 year old female   Provider:  STACIE ANTI PAZ   Department:  [facility]           INR as of 2/10/2017     Selected INR 4.8! (2/10/2017)      Anticoagulation Summary as of 2/10/2017     INR goal 2.0-3.0   Selected INR 4.8! (2/10/2017)   Full instructions 2/10: Hold; 2/11: 1.25 mg; 2/12: 1.25 mg   Next INR check 2/13/2017    Indications   Long-term (current) use of anticoagulants [Z79.01] [Z79.01]  Atrial fibrillation (H) [I48.91]         Your next Anticoagulation Clinic appointment(s)     Feb 13, 2017  8:15 AM   [facility]agulation [facility] with [facility]   [facility]    [address] [facility] [address]   [phone]              Contact Numbers     Clinic Number:         February 2017 Details    Sun Mon Tue Wed Thu Fri Sat        1               2               3               4                 5               6               7               8               9               10      Hold   See details      11      1.25 mg           12      1.25 mg         13            14               15               16               17               18                 19               20               21               22               23               24               25                 26               27               28                    Date Details   02/10 This INR check       Date of next INR:  2/13/2017         How to take your warfarin dose     To take:  1.25 mg Take 0.5 of a 2.5 mg tablet.    Hold Do not take your warfarin dose. See the Details table to the right for additional instructions.

## 2017-02-10 NOTE — PROGRESS NOTES
ANTICOAGULATION FOLLOW-UP CLINIC VISIT    Patient Name:  Elvia Amaro  Date:  2/10/2017  Contact Type:  Face to Face    SUBJECTIVE:     Patient Findings     Positives Initiation of therapy           OBJECTIVE    INR PROTIME   Date Value Ref Range Status   02/10/2017 4.8* 0.86 - 1.14 Final       ASSESSMENT / PLAN  INR assessment SUPRA    Recheck INR In: 3 DAYS    INR Location Clinic      Anticoagulation Summary as of 2/10/2017     INR goal 2.0-3.0   Selected INR 4.8! (2/10/2017)   Maintenance plan No maintenance plan   Full instructions 2/10: Hold; 2/11: 1.25 mg; 2/12: 1.25 mg   Plan last modified Campos Carrera RN (2/10/2017)   Next INR check 2/13/2017   Target end date     Indications   Long-term (current) use of anticoagulants [Z79.01] [Z79.01]  Atrial fibrillation (H) [I48.91]         Anticoagulation Episode Summary     INR check location     Preferred lab     Send INR reminders to \[facility]\""    Comments       Anticoagulation Care Providers     Provider Role Specialty Phone number    Tej Tilley MD [facility] [phone]            See the Encounter Report to view Anticoagulation Flowsheet and Dosing Calendar (Go to Encounters tab in chart review, and find the Anticoagulation Therapy Visit)    Dosage adjustment made based on physician directed care plan.    Campos Carrera, RN

## 2017-02-13 ENCOUNTER — HOSPITAL ENCOUNTER (OUTPATIENT)
Dept: CARDIOLOGY | Facility: CLINIC | Age: 77
Discharge: HOME | End: 2017-02-13
Attending: INTERNAL MEDICINE | Admitting: INTERNAL MEDICINE
Payer: MEDICARE

## 2017-02-13 ENCOUNTER — ANTICOAGULATION THERAPY VISIT (OUTPATIENT)
Dept: ANTICOAGULATION | Facility: OTHER | Age: 77
End: 2017-02-13
Payer: COMMERCIAL

## 2017-02-13 DIAGNOSIS — Z79.01 LONG-TERM (CURRENT) USE OF ANTICOAGULANTS: ICD-10-CM

## 2017-02-13 DIAGNOSIS — I48.91 ATRIAL FIBRILLATION, UNSPECIFIED TYPE (H): ICD-10-CM

## 2017-02-13 DIAGNOSIS — I42.9 CARDIOMYOPATHY (H): ICD-10-CM

## 2017-02-13 LAB — INR POINT OF CARE: 3.1 (ref 0.86–1.14)

## 2017-02-13 PROCEDURE — 36416 COLLJ CAPILLARY BLOOD SPEC: CPT

## 2017-02-13 PROCEDURE — 25500064 ZZH RX 255 OP 636: Performed by: INTERNAL MEDICINE

## 2017-02-13 PROCEDURE — 99207 ZZC NO CHARGE NURSE ONLY: CPT

## 2017-02-13 PROCEDURE — 85610 PROTHROMBIN TIME: CPT | Mod: QW

## 2017-02-13 PROCEDURE — 40000264 ECHO COMPLETE WITH OPTISON

## 2017-02-13 PROCEDURE — 93306 TTE W/DOPPLER COMPLETE: CPT | Mod: 26 | Performed by: INTERNAL MEDICINE

## 2017-02-13 RX ADMIN — HUMAN ALBUMIN MICROSPHERES AND PERFLUTREN 3 ML: 10; .22 INJECTION, SOLUTION INTRAVENOUS at 14:54

## 2017-02-13 NOTE — PROGRESS NOTES
ANTICOAGULATION FOLLOW-UP CLINIC VISIT    Patient Name:  Elvia Amaro  Date:  2/13/2017  Contact Type:  Face to Face    SUBJECTIVE:     Patient Findings     Positives No Problem Findings           OBJECTIVE    INR Protime   Date Value Ref Range Status   02/13/2017 3.1 (A) 0.86 - 1.14 Final       ASSESSMENT / PLAN  INR assessment THER    Recheck INR In: 10 DAYS    INR Location Clinic      Anticoagulation Summary as of 2/13/2017     INR goal 2.0-3.0   Today's INR 3.1!   Maintenance plan 2.5 mg (2.5 mg x 1) on Tue, Sat; 1.25 mg (2.5 mg x 0.5) all other days   Full instructions 2.5 mg on Tue, Sat; 1.25 mg all other days   Weekly total 11.25 mg   Plan last modified Campos Carrera RN (2/13/2017)   Next INR check 2/22/2017   Target end date     Indications   Long-term (current) use of anticoagulants [Z79.01] [Z79.01]  Atrial fibrillation (H) [I48.91]         Anticoagulation Episode Summary     INR check location     Preferred lab     Send INR reminders to [facility]    Comments       Anticoagulation Care Providers     Provider Role Specialty Phone number    Tej Tilley MD [facility] [phone]            See the Encounter Report to view Anticoagulation Flowsheet and Dosing Calendar (Go to Encounters tab in chart review, and find the Anticoagulation Therapy Visit)    Dosage adjustment made based on physician directed care plan.    Campos Carrera RN

## 2017-02-22 ENCOUNTER — ANTICOAGULATION THERAPY VISIT (OUTPATIENT)
Dept: ANTICOAGULATION | Facility: OTHER | Age: 77
End: 2017-02-22
Payer: COMMERCIAL

## 2017-02-22 ENCOUNTER — HOSPITAL ENCOUNTER (OUTPATIENT)
Dept: RESPIRATORY THERAPY | Facility: CLINIC | Age: 77
Discharge: HOME | End: 2017-02-22
Attending: INTERNAL MEDICINE | Admitting: INTERNAL MEDICINE
Payer: MEDICARE

## 2017-02-22 DIAGNOSIS — I48.91 ATRIAL FIBRILLATION, UNSPECIFIED TYPE (H): ICD-10-CM

## 2017-02-22 DIAGNOSIS — Z79.01 LONG-TERM (CURRENT) USE OF ANTICOAGULANTS: ICD-10-CM

## 2017-02-22 DIAGNOSIS — Z79.899 ON AMIODARONE THERAPY: ICD-10-CM

## 2017-02-22 LAB — INR POINT OF CARE: 1.8 (ref 0.86–1.14)

## 2017-02-22 PROCEDURE — 85610 PROTHROMBIN TIME: CPT | Mod: QW

## 2017-02-22 PROCEDURE — 94726 PLETHYSMOGRAPHY LUNG VOLUMES: CPT

## 2017-02-22 PROCEDURE — 36416 COLLJ CAPILLARY BLOOD SPEC: CPT

## 2017-02-22 PROCEDURE — 94729 DIFFUSING CAPACITY: CPT

## 2017-02-22 PROCEDURE — 99207 ZZC NO CHARGE NURSE ONLY: CPT

## 2017-02-22 PROCEDURE — 94010 BREATHING CAPACITY TEST: CPT

## 2017-02-22 NOTE — DISCHARGE INSTRUCTIONS
Thank you for completing pulmonary function testing today.  All results will be scanned into your epic results for your doctor to review.  Please resume taking all your current prescribed medications and diet as directed by your provider.   If you have not heard from your provider about your testing within two weeks and do not have a follow-up appointment scheduled with them please contact your provider about any questions you have concerning your testing.   Thank you  [facility]

## 2017-02-22 NOTE — PROGRESS NOTES
ANTICOAGULATION FOLLOW-UP CLINIC VISIT    Patient Name:  Elvia Amaro  Date:  2/22/2017  Contact Type:  Face to Face    SUBJECTIVE:     Patient Findings     Positives No Problem Findings           OBJECTIVE    INR Protime   Date Value Ref Range Status   02/22/2017 1.8 (A) 0.86 - 1.14 Final       ASSESSMENT / PLAN  INR assessment SUB    Recheck INR In: 10 DAYS    INR Location Clinic      Anticoagulation Summary as of 2/22/2017     INR goal 2.0-3.0   Today's INR 1.8!   Maintenance plan 2.5 mg (2.5 mg x 1) on Tue, Thu, Sat; 1.25 mg (2.5 mg x 0.5) all other days   Full instructions 2.5 mg on Tue, Thu, Sat; 1.25 mg all other days   Weekly total 12.5 mg   Plan last modified Campos Carrera RN (2/22/2017)   Next INR check 3/3/2017   Target end date     Indications   Long-term (current) use of anticoagulants [Z79.01] [Z79.01]  Atrial fibrillation (H) [I48.91]         Anticoagulation Episode Summary     INR check location     Preferred lab     Send INR reminders to \[facility]\""    Comments       Anticoagulation Care Providers     Provider Role Specialty Phone number    Tej Tilley MD [facility] [phone]            See the Encounter Report to view Anticoagulation Flowsheet and Dosing Calendar (Go to Encounters tab in chart review, and find the Anticoagulation Therapy Visit)    Dosage adjustment made based on physician directed care plan.    Campos Carrera RN

## 2017-02-22 NOTE — MR AVS SNAPSHOT
Elvia Amaro   2/22/2017 9:15 AM   Anticoagulation Therapy Visit    Description:  76 year old female   Provider:  STACIE ANTI COAG   Department:  [facility]           INR as of 2/22/2017     Today's INR 1.8!      Anticoagulation Summary as of 2/22/2017     INR goal 2.0-3.0   Today's INR 1.8!   Full instructions 2.5 mg on Tue, Thu, Sat; 1.25 mg all other days   Next INR check 3/3/2017    Indications   Long-term (current) use of anticoagulants [Z79.01] [Z79.01]  Atrial fibrillation (H) [I48.91]         Your next Anticoagulation Clinic appointment(s)     Mar 03, 2017  4:15 PM CST   Anticoagulation Visit with [facility] ANTI PAZ   [facility]    [address] [facility] [address]   [phone]              Contact Numbers     Clinic Number:         February 2017 Details    Sun Mon Tue Wed Thu Fri Sat        1               2               3               4                 5               6               7               8               9               10               11                 12               13               14               15               16               17               18                 19               20               21               22      1.25 mg   See details      23      2.5 mg         24      1.25 mg         25      2.5 mg           26      1.25 mg         27      1.25 mg         28      2.5 mg              Date Details   02/22 This INR check               How to take your warfarin dose     To take:  1.25 mg Take 0.5 of a 2.5 mg tablet.    To take:  2.5 mg Take 1 of the 2.5 mg tablets.           March 2017 Details    Sun Mon Tue Wed Thu Fri Sat        1      1.25 mg         2      2.5 mg         3            4                 5               6               7               8               9               10               11                 12               13               14               15               16               17               18                  19               20               21               22               23               24               25                 26               27               28               29               30               31                 Date Details   No additional details    Date of next INR:  3/3/2017         How to take your warfarin dose     To take:  1.25 mg Take 0.5 of a 2.5 mg tablet.    To take:  2.5 mg Take 1 of the 2.5 mg tablets.

## 2017-02-23 LAB
DLCOUNC-%PRED-PRE: 78 %
DLCOUNC-PRE: 15.58 ML/MIN/MMHG
DLCOUNC-PRED: 19.96 ML/MIN/MMHG
ERV-%PRED-PRE: 112 %
ERV-PRE: 0.2 L
ERV-PRED: 0.18 L
EXPTIME-PRE: 7.01 SEC
FEF2575-%PRED-PRE: 123 %
FEF2575-PRE: 2.02 L/SEC
FEF2575-PRED: 1.63 L/SEC
FEFMAX-%PRED-PRE: 129 %
FEFMAX-PRE: 6.56 L/SEC
FEFMAX-PRED: 5.07 L/SEC
FEV1-%PRED-PRE: 96 %
FEV1-PRE: 1.96 L
FEV1FEV6-PRE: 83 %
FEV1FEV6-PRED: 78 %
FEV1FVC-PRE: 82 %
FEV1FVC-PRED: 77 %
FEV1SVC-PRE: 77 %
FEV1SVC-PRED: 67 %
FIFMAX-PRE: 4.37 L/SEC
FRCPLETH-%PRED-PRE: 74 %
FRCPLETH-PRE: 2.08 L
FRCPLETH-PRED: 2.78 L
FVC-%PRED-PRE: 88 %
FVC-PRE: 2.37 L
FVC-PRED: 2.67 L
GAW-%PRED-PRE: 40 %
GAW-PRE: 0.42 L/S/CMH2O
GAW-PRED: 1.03 L/S/CMH2O
IC-%PRED-PRE: 82 %
IC-PRE: 2.34 L
IC-PRED: 2.83 L
RVPLETH-%PRED-PRE: 83 %
RVPLETH-PRE: 1.88 L
RVPLETH-PRED: 2.25 L
SGAW-%PRED-PRE: 199 %
SGAW-PRE: 0.2 1/CMH2O*S
SGAW-PRED: 0.1 1/CMH2O*S
SRAW-%PRED-PRE: 111 %
SRAW-PRE: 5.3 CMH2O*S
SRAW-PRED: 4.76 CMH2O*S
TLCPLETH-%PRED-PRE: 86 %
TLCPLETH-PRE: 4.42 L
TLCPLETH-PRED: 5.11 L
VA-%PRED-PRE: 75 %
VA-PRE: 3.96 L
VC-%PRED-PRE: 84 %
VC-PRE: 2.54 L
VC-PRED: 3.01 L

## 2017-03-02 DIAGNOSIS — M51.379 DEGENERATION OF LUMBAR OR LUMBOSACRAL INTERVERTEBRAL DISC: ICD-10-CM

## 2017-03-02 RX ORDER — MELOXICAM 7.5 MG/1
TABLET ORAL
Qty: 90 TABLET | Refills: 3 | Status: SHIPPED | OUTPATIENT
Start: 2017-03-02 | End: 2018-10-22

## 2017-03-02 NOTE — TELEPHONE ENCOUNTER
Reason for Call:  Medication or medication refill:    Do you use a [facility]?  Name of the pharmacy and phone number for the current request:  [facility] [phone]    Name of the medication requested: Meloxicam 7.5 mg    Other request: Must be 90 day supply so they will mail it for free.  Patient has 13 pills left.    Can we leave a detailed message on this number? YES    Phone number patient can be reached at: Home number on file [phone] (home)    Best Time: any    Call taken on 3/2/2017 at 7:57 AM by Danny Garvey

## 2017-03-02 NOTE — TELEPHONE ENCOUNTER
Routing refill request to provider for review/approval because:  Patient is switching to mail order. They will not accept transferred RX. Will have provider review. RX pended    Brandie Deutsch RN  [facility]

## 2017-03-03 ENCOUNTER — ANTICOAGULATION THERAPY VISIT (OUTPATIENT)
Dept: ANTICOAGULATION | Facility: OTHER | Age: 77
End: 2017-03-03
Payer: COMMERCIAL

## 2017-03-03 DIAGNOSIS — I48.91 ATRIAL FIBRILLATION, UNSPECIFIED TYPE (H): ICD-10-CM

## 2017-03-03 DIAGNOSIS — Z79.01 LONG-TERM (CURRENT) USE OF ANTICOAGULANTS: ICD-10-CM

## 2017-03-03 LAB — INR POINT OF CARE: 1.6 (ref 0.86–1.14)

## 2017-03-03 PROCEDURE — 85610 PROTHROMBIN TIME: CPT | Mod: QW

## 2017-03-03 PROCEDURE — 99207 ZZC NO CHARGE NURSE ONLY: CPT

## 2017-03-03 PROCEDURE — 36416 COLLJ CAPILLARY BLOOD SPEC: CPT

## 2017-03-03 NOTE — PROGRESS NOTES
ANTICOAGULATION FOLLOW-UP CLINIC VISIT    Patient Name:  Elvia Amaro  Date:  3/3/2017  Contact Type:  Face to Face    SUBJECTIVE:     Patient Findings     Positives Initiation of therapy, Unexplained INR or factor level change           OBJECTIVE    INR Protime   Date Value Ref Range Status   03/03/2017 1.6 (A) 0.86 - 1.14 Final       ASSESSMENT / PLAN  INR assessment SUB    Recheck INR In: 3 DAYS    INR Location Clinic      Anticoagulation Summary as of 3/3/2017     INR goal 2.0-3.0   Today's INR 1.6!   Maintenance plan 2.5 mg (2.5 mg x 1) on Tue, Thu, Sat; 1.25 mg (2.5 mg x 0.5) all other days   Full instructions 3/3: 2.5 mg; 3/5: 2.5 mg; Otherwise 2.5 mg on Tue, Thu, Sat; 1.25 mg all other days   Weekly total 12.5 mg   Plan last modified Campos Carrera RN (2/22/2017)   Next INR check 3/6/2017   Target end date     Indications   Long-term (current) use of anticoagulants [Z79.01] [Z79.01]  Atrial fibrillation (H) [I48.91]         Anticoagulation Episode Summary     INR check location     Preferred lab     Send INR reminders to hospitals    Comments       Anticoagulation Care Providers     Provider Role Specialty Phone number    Tej Tilley MD [facility] [phone]            See the Encounter Report to view Anticoagulation Flowsheet and Dosing Calendar (Go to Encounters tab in chart review, and find the Anticoagulation Therapy Visit)    Dosage adjustment made based on physician directed care plan.    Campos Carrera RN

## 2017-03-03 NOTE — MR AVS SNAPSHOT
Elvianoel Amaro   3/3/2017 4:15 PM   Anticoagulation Therapy Visit    Description:  76 year old female   Provider:  STACIE ANTI COAMEHREEN   Department:  [facility]           INR as of 3/3/2017     Today's INR 1.6!      Anticoagulation Summary as of 3/3/2017     INR goal 2.0-3.0   Today's INR 1.6!   Full instructions 3/3: 2.5 mg; 3/5: 2.5 mg; Otherwise 2.5 mg on Tue, Thu, Sat; 1.25 mg all other days   Next INR check 3/6/2017    Indications   Long-term (current) use of anticoagulants [Z79.01] [Z79.01]  Atrial fibrillation (H) [I48.91]         Your next Anticoagulation Clinic appointment(s)     Mar 03, 2017  4:15 PM CST   [facility]    [address] [facility] [address]   [phone]            Mar 06, 2017  9:00 AM CST   Anticoagulation Visit with  ANTI COAG   [facility]    [address] [facility] [address]   [phone]              Contact Numbers     Clinic Number:         March 2017 Details    Sun Mon Tue Wed Thu Fri Sat        1               2               3      2.5 mg   See details      4      2.5 mg           5      2.5 mg         6            7               8               9               10               11                 12               13               14               15               16               17               18                 19               20               21               22               23               24               25                 26               27               28               29               30               31                 Date Details   03/03 This INR check       Date of next INR:  3/6/2017         How to take your warfarin dose     To take:  1.25 mg Take 0.5 of a 2.5 mg tablet.    To take:  2.5 mg Take 1 of the 2.5 mg tablets.

## 2017-03-06 ENCOUNTER — ANTICOAGULATION THERAPY VISIT (OUTPATIENT)
Dept: ANTICOAGULATION | Facility: OTHER | Age: 77
End: 2017-03-06
Payer: COMMERCIAL

## 2017-03-06 DIAGNOSIS — I48.91 ATRIAL FIBRILLATION, UNSPECIFIED TYPE (H): ICD-10-CM

## 2017-03-06 DIAGNOSIS — Z79.01 LONG-TERM (CURRENT) USE OF ANTICOAGULANTS: ICD-10-CM

## 2017-03-06 LAB — INR POINT OF CARE: 1.9 (ref 0.86–1.14)

## 2017-03-06 PROCEDURE — 85610 PROTHROMBIN TIME: CPT | Mod: QW

## 2017-03-06 PROCEDURE — 99207 ZZC NO CHARGE NURSE ONLY: CPT

## 2017-03-06 PROCEDURE — 36416 COLLJ CAPILLARY BLOOD SPEC: CPT

## 2017-03-06 NOTE — PROGRESS NOTES
ANTICOAGULATION FOLLOW-UP CLINIC VISIT    Patient Name:  Elvia Amaro  Date:  3/6/2017  Contact Type:  Face to Face    SUBJECTIVE:     Patient Findings     Positives No Problem Findings           OBJECTIVE    INR Protime   Date Value Ref Range Status   03/06/2017 1.9 (A) 0.86 - 1.14 Final       ASSESSMENT / PLAN  INR assessment THER    Recheck INR In: 1 WEEK    INR Location Clinic      Anticoagulation Summary as of 3/6/2017     INR goal 2.0-3.0   Today's INR 1.9!   Maintenance plan 1.25 mg (2.5 mg x 0.5) on Tue, Sat; 2.5 mg (2.5 mg x 1) all other days   Full instructions 1.25 mg on Tue, Sat; 2.5 mg all other days   Weekly total 15 mg   Plan last modified Campos Carrera RN (3/6/2017)   Next INR check 3/15/2017   Target end date     Indications   Long-term (current) use of anticoagulants [Z79.01] [Z79.01]  Atrial fibrillation (H) [I48.91]         Anticoagulation Episode Summary     INR check location     Preferred lab     Send INR remind[facility]    Comments       Anticoagulation Care Providers     Provider Role Specialty Phone number    Tej Tilley MD [facility] [phone]            See the Encounter Report to view Anticoagulation Flowsheet and Dosing Calendar (Go to Encounters tab in chart review, and find the Anticoagulation Therapy Visit)    Dosage adjustment made based on physician directed care plan.    Campos Carrera RN

## 2017-03-06 NOTE — MR AVS SNAPSHOT
Elvia Amaro   3/6/2017 9:00 AM   Anticoagulation Therapy Visit    Description:  76 year old female   Provider:  STACIE ANTI PAZ   Department:  [facility]           INR as of 3/6/2017     Today's INR 1.9!      Anticoagulation Summary as of 3/6/2017     INR goal 2.0-3.0   Today's INR 1.9!   Full instructions 1.25 mg on Tue, Sat; 2.5 mg all other days   Next INR check 3/15/2017    Indications   Long-term (current) use of anticoagulants [Z79.01] [Z79.01]  Atrial fibrillation (H) [I48.91]         Your next Anticoagulation Clinic appointment(s)     Mar 15, 2017  9:15 AM CDT   Anticoagulation Visit with [facility]TI [facility]AG   [facility]    [address] [facility] [address]   [phone]              Contact Numbers     Clinic Number:         March 2017 Details    Sun Mon Tue Wed Thu Fri Sat        1               2               3               4                 5               6      2.5 mg   See details      7      1.25 mg         8      2.5 mg         9      2.5 mg         10      2.5 mg         11      1.25 mg           12      2.5 mg         13      2.5 mg         14      1.25 mg         15            16               17               18                 19               20               21               22               23               24               25                 26               27               28               29               30               31                 Date Details   03/06 This INR check       Date of next INR:  3/15/2017         How to take your warfarin dose     To take:  1.25 mg Take 0.5 of a 2.5 mg tablet.    To take:  2.5 mg Take 1 of the 2.5 mg tablets.

## 2017-03-15 ENCOUNTER — ANTICOAGULATION THERAPY VISIT (OUTPATIENT)
Dept: ANTICOAGULATION | Facility: OTHER | Age: 77
End: 2017-03-15
Payer: COMMERCIAL

## 2017-03-15 DIAGNOSIS — E03.1 CONGENITAL HYPOTHYROIDISM WITHOUT GOITER: ICD-10-CM

## 2017-03-15 DIAGNOSIS — I48.91 ATRIAL FIBRILLATION, UNSPECIFIED TYPE (H): ICD-10-CM

## 2017-03-15 DIAGNOSIS — Z79.01 LONG-TERM (CURRENT) USE OF ANTICOAGULANTS: ICD-10-CM

## 2017-03-15 LAB — INR POINT OF CARE: 2.3 (ref 0.86–1.14)

## 2017-03-15 PROCEDURE — 99207 ZZC NO CHARGE NURSE ONLY: CPT

## 2017-03-15 PROCEDURE — 85610 PROTHROMBIN TIME: CPT | Mod: QW

## 2017-03-15 PROCEDURE — 36416 COLLJ CAPILLARY BLOOD SPEC: CPT

## 2017-03-15 NOTE — TELEPHONE ENCOUNTER
levothyroxine (SYNTHROID/LEVOTHROID) 100 MCG tablet     Last Written Prescription Date: 12/16/16  Last Quantity: 90, # refills: 0  Last Office Visit with [facility], [facility] or [facility] prescribing provider: 9/28/16        TSH   Date Value Ref Range Status   09/28/2016 6.32 (H) 0.40 - 4.00 mU/L Final

## 2017-03-15 NOTE — PROGRESS NOTES
ANTICOAGULATION FOLLOW-UP CLINIC VISIT    Patient Name:  Elvia Amaro  Date:  3/15/2017  Contact Type:  Face to Face    SUBJECTIVE:     Patient Findings     Positives No Problem Findings           OBJECTIVE    INR Protime   Date Value Ref Range Status   03/15/2017 2.3 (A) 0.86 - 1.14 Final       ASSESSMENT / PLAN  INR assessment THER    Recheck INR In: 2 WEEKS    INR Location Clinic      Anticoagulation Summary as of 3/15/2017     INR goal 2.0-3.0   Today's INR 2.3   Maintenance plan 1.25 mg (2.5 mg x 0.5) on Tue, Sat; 2.5 mg (2.5 mg x 1) all other days   Full instructions 1.25 mg on Tue, Sat; 2.5 mg all other days   Weekly total 15 mg   No change documented Campos Carrera RN   Plan last modified Campos Carrera RN (3/6/2017)   Next INR check 3/27/2017   Target end date     Indications   Long-term (current) use of anticoagulants [Z79.01] [Z79.01]  Atrial fibrillation (H) [I48.91]         Anticoagulation Episode Summary     INR check location     Preferred lab     Send INR reminders to [facility]    Comments       Anticoagulation Care Providers     Provider Role Specialty Phone number    Tej Tilley MD [facility] [phone]            See the Encounter Report to view Anticoagulation Flowsheet and Dosing Calendar (Go to Encounters tab in chart review, and find the Anticoagulation Therapy Visit)    Dosage adjustment made based on physician directed care plan.    Campos Carrera, RN

## 2017-03-15 NOTE — MR AVS SNAPSHOT
Elvia Amaro   3/15/2017 9:15 AM   Anticoagulation Therapy Visit    Description:  76 year old female   Provider:  STACIE ANTI COAG   Department:  [facility]           INR as of 3/15/2017     Today's INR 2.3      Anticoagulation Summary as of 3/15/2017     INR goal 2.0-3.0   Today's INR 2.3   Full instructions 1.25 mg on Tue, Sat; 2.5 mg all other days   Next INR check 3/27/2017    Indications   Long-term (current) use of anticoagulants [Z79.01] [Z79.01]  Atrial fibrillation (H) [I48.91]         Your next Anticoagulation Clinic appointment(s)     Mar 27, 2017  9:15 AM CDT   [facility]    [address] [facility] [address]   [phone]              Contact Numbers     Clinic Number:         March 2017 Details    Sun Mon Tue Wed Thu Fri Sat        1               2               3               4                 5               6               7               8               9               10               11                 12               13               14               15      2.5 mg   See details      16      2.5 mg         17      2.5 mg         18      1.25 mg           19      2.5 mg         20      2.5 mg         21      1.25 mg         22      2.5 mg         23      2.5 mg         24      2.5 mg         25      1.25 mg           26      2.5 mg         27            28               29               30               31                 Date Details   03/15 This INR check       Date of next INR:  3/27/2017         How to take your warfarin dose     To take:  1.25 mg Take 0.5 of a 2.5 mg tablet.    To take:  2.5 mg Take 1 of the 2.5 mg tablets.

## 2017-03-16 RX ORDER — LEVOTHYROXINE SODIUM 100 UG/1
TABLET ORAL
Qty: 30 TABLET | Refills: 0 | Status: SHIPPED
Start: 2017-03-16 | End: 2017-04-07

## 2017-03-16 NOTE — TELEPHONE ENCOUNTER
Routing refill request to provider for review/approval because:  Labs out of range:  TSH    Brandie Deutsch, RN  [facility]

## 2017-03-27 ENCOUNTER — ANTICOAGULATION THERAPY VISIT (OUTPATIENT)
Dept: ANTICOAGULATION | Facility: OTHER | Age: 77
End: 2017-03-27
Payer: COMMERCIAL

## 2017-03-27 DIAGNOSIS — Z79.01 LONG-TERM (CURRENT) USE OF ANTICOAGULANTS: ICD-10-CM

## 2017-03-27 DIAGNOSIS — I48.91 ATRIAL FIBRILLATION, UNSPECIFIED TYPE (H): ICD-10-CM

## 2017-03-27 LAB — INR POINT OF CARE: 2.3 (ref 0.86–1.14)

## 2017-03-27 PROCEDURE — 85610 PROTHROMBIN TIME: CPT | Mod: QW

## 2017-03-27 PROCEDURE — 36416 COLLJ CAPILLARY BLOOD SPEC: CPT

## 2017-03-27 PROCEDURE — 99207 ZZC NO CHARGE NURSE ONLY: CPT

## 2017-03-27 NOTE — MR AVS SNAPSHOT
Elvia Amaro   3/27/2017 9:15 AM   Anticoagulation Therapy Visit    Description:  76 year old female   Provider:  STACIE ANTI COAG   Department:  [facility]           INR as of 3/27/2017     Today's INR 2.3      Anticoagulation Summary as of 3/27/2017     INR goal 2.0-3.0   Today's INR 2.3   Full instructions 1.25 mg on Tue, Sat; 2.5 mg all other days   Next INR check 4/10/2017    Indications   Long-term (current) use of anticoagulants [Z79.01] [Z79.01]  Atrial fibrillation (H) [I48.91]         Your next Anticoagulation Clinic appointment(s)     Apr 10, 2017  4:15 PM CDT   Anticoagulation Visit with STACIE ANTI PAZ   [facility]    [address] [facility] [address]   [phone]              Contact Numbers     Clinic Number:         March 2017 Details    Sun Mon Tue Wed Thu Fri Sat        1               2               3               4                 5               6               7               8               9               10               11                 12               13               14               15               16               17               18                 19               20               21               22               23               24               25                 26               27      2.5 mg   See details      28      1.25 mg         29      2.5 mg         30      2.5 mg         31      2.5 mg           Date Details   03/27 This INR check               How to take your warfarin dose     To take:  1.25 mg Take 0.5 of a 2.5 mg tablet.    To take:  2.5 mg Take 1 of the 2.5 mg tablets.           April 2017 Details    Sun Mon Tue Wed Thu Fri Sat           1      1.25 mg           2      2.5 mg         3      2.5 mg         4      1.25 mg         5      2.5 mg         6      2.5 mg         7      2.5 mg         8      1.25 mg           9      2.5 mg         10            11               12               13               14                15                 16               17               18               19               20               21               22                 23               24               25               26               27               28               29                 30                      Date Details   No additional details    Date of next INR:  4/10/2017         How to take your warfarin dose     To take:  1.25 mg Take 0.5 of a 2.5 mg tablet.    To take:  2.5 mg Take 1 of the 2.5 mg tablets.

## 2017-03-27 NOTE — PROGRESS NOTES
ANTICOAGULATION FOLLOW-UP CLINIC VISIT    Patient Name:  Elvia Amaro  Date:  3/27/2017  Contact Type:  Face to Face    SUBJECTIVE:     Patient Findings     Positives No Problem Findings           OBJECTIVE    INR Protime   Date Value Ref Range Status   03/27/2017 2.3 (A) 0.86 - 1.14 Final       ASSESSMENT / PLAN  INR assessment THER    Recheck INR In: 2 WEEKS    INR Location Clinic      Anticoagulation Summary as of 3/27/2017     INR goal 2.0-3.0   Today's INR 2.3   Maintenance plan 1.25 mg (2.5 mg x 0.5) on Tue, Sat; 2.5 mg (2.5 mg x 1) all other days   Full instructions 1.25 mg on Tue, Sat; 2.5 mg all other days   Weekly total 15 mg   No change documented Campos Carrera RN   Plan last modified Campos Carrera RN (3/6/2017)   Next INR check 4/10/2017   Target end date     Indications   Long-term (current) use of anticoagulants [Z79.01] [Z79.01]  Atrial fibrillation (H) [I48.91]         Anticoagulation Episode Summary     INR check location     Preferred lab     Send INR reminders to [facility]    Comments       Anticoagulation Care Providers     Provider Role Specialty Phone number    Tej Tilley MD [facility] [phone]            See the Encounter Report to view Anticoagulation Flowsheet and Dosing Calendar (Go to Encounters tab in chart review, and find the Anticoagulation Therapy Visit)    Dosage adjustment made based on physician directed care plan.    Campos Carrera, RN

## 2017-04-05 PROBLEM — C50.412 BREAST CANCER OF UPPER-OUTER QUADRANT OF LEFT FEMALE BREAST (H): Status: ACTIVE | Noted: 2017-04-05

## 2017-04-05 NOTE — PROGRESS NOTES
Ms. Amaro is a 75-year-old woman with a history of triple-negative infiltrating ductal carcinoma of the left breast diagnosed in 10/2010. The presentation was consistent with inflammatory breast cancer. She had a 4-cm mass in the upper outer quadrant of the left breast and some skin erythema as well as axillary lymphadenopathy on the left. Skin biopsy was negative for an infiltrating tumor cells. For neoadjuvant chemotherapy she received 4 cycles of dose-dense AC from October through December 2010 followed by dose-dense Taxol from December 2010 through February 2011. The tumor responded to the therapy with reduction in size of the breast mass and she had a near complete response, which placed her in the favorable RCB-1 category.   She underwent a left mastectomy on 03/21/2011 and had a left axillary lymph node dissection along with a TAHBSO due to an abnormal Pap smear. Pathology of the breast showed a minute focus of 0.2 cm of carcinoma which was grade 3 invasive ductal carcinoma, ER and HI negative, HER2 negative by immunohistochemistry and equivocal by FISH. The margins were negative. All 21 lymph nodes were negative. The uterus pathology showed an endometrial polyp and the ovaries and the fallopian tubes were normal.   Ms. Amaro went on to complete radiation therapy to the left chest wall, which she completed in July 2011.     FOLLOWUP NOTE      INTERVAL HISTORY:  Elvia returns to clinic.  She has been feeling entirely well.  She has occasional low back pain, but this is a chronic problem for her.  She has no fatigue, no depression, no anxiety.  She had a recent echocardiogram showing an ejection fraction of 60%, and she has had normal pulmonary function tests.      REVIEW OF SYSTEMS:  She denies fevers or chills, cough, chest pain or shortness of breath, nausea or vomiting, constipation or diarrhea, bone pain, back pain or headache.  The remainder of a 10-point review of systems is negative.  She has no  dyspnea on exertion.  No lower extremity edema.  She exercises for 1 hour 3 times a week at a local [employer].      PHYSICAL EXAMINATION:   VITAL SIGNS:  Blood pressure 156/80, temperature 96.9, pulse 60, respirations 16, O2 sat 96% on room air, height 1.66 meters and weight 103 kg.   GENERAL:  Elvia appeared generally well.  She has no alopecia.   HEENT:  Oropharynx is without lesions.   LYMPH:  There is no palpable cervical, supraclavicular, subclavicular or axillary lymphadenopathy.   BREASTS:  Examination of the right breast reveals no masses.  There is an area that she was concerned about where she felt some twinge of discomfort at the 10 o'clock position, 4 fingerbreadths from the nipple-areolar complex, and there is no abnormality palpable at that site.  The left anterior chest wall has a well-healed mastectomy incision without erythema or masses.  There is overlying telangiectasia, especially along the anterior axillary line.  No masses in the anterior chest wall bilaterally.   LUNGS:  Clear to percussion and auscultation.   HEART:  There is a regular rate and rhythm, S1, S2.   ABDOMEN:  Soft and nontender, consistent with increased body mass index.   EXTREMITIES:  Without edema.   PSYCHIATRIC:  Mood and affect were normal.   SKIN:  Examination of the right calf and the left shin both showed no lesions, and well-healed incisions where prior skin lesions had been removed.      LABORATORY DATA:  The CBC and CMP were within normal limits, except for a slightly lower GFR of 56 mL/minute.  The absolute lymphocyte count is slightly low at 700.  Her last INR was 2.3 on 03/27/2017.      Echocardiogram showed an ejection fraction of 60% on 02/03/2017.      ASSESSMENT AND PLAN:   1.  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  She was treated with neoadjuvant chemotherapy with 4 cycles of  dose-dense AC followed by 4 cycles of dose-dense Taxol.  She went on to have a left mastectomy with clear margins.  There was a residual 0.2 cm focus of tumor present, which was grade 3.  The left axillary lymph node dissection revealed no lymph node involvement of 21 lymph nodes examined.  The pathologic stage was ypT1a N0 MX.  The tumor was RCB1 with a 10% risk of recurrence at 5 years.  She completed radiation therapy, and has had no evidence of recurrence at the 7-year divina.  Her mammogram from 10/2016 showed benign results, BI-RADS 1.   2.  Discussion of diet and exercise.  She is eating a low-fat diet.  She is exercising at a local gym, which is located in a [employer], and she exercises about 1 hour on Monday, Wednesday and Friday.  I commended her on her exercise schedule.   3.  Bone health.  She takes calcium and vitamin D.  Her DEXA scan from 03/26/2015 showed normal bone density with a most valid negative T score of -0.6.  I do not think we need to repeat the DEXA scan at the present time.   4.  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  The surgery was performed by Dr. Conor Brooks in Dermatology   5.  Atrial fibrillation.  Being managed by Dr. Kerr.  She is on amiodarone, meloxicam, carvedilol and warfarin.   6.  Followup.  We will see Elvia in followup in our clinic in 6 months with Megan Rodríguez, and a mammogram will be performed.  We will see her in followup with me in 1 year. Follow up with Megan Rodríguez 10-5-17 with a right mammogram, CBC, CMP.  Follow up with me April 5, 2018 with CBC, CMP.        Thank you for allowing us to continue to participate in Elvia Amaro's care.      Elijah Padilla MD          [facility]       I spent 20 minutes with the patient more than 50% of which was in counseling and coordination of care.

## 2017-04-06 ENCOUNTER — ONCOLOGY VISIT (OUTPATIENT)
Dept: ONCOLOGY | Facility: CLINIC | Age: 77
End: 2017-04-06
Attending: INTERNAL MEDICINE
Payer: MEDICARE

## 2017-04-06 VITALS
OXYGEN SATURATION: 96 % | RESPIRATION RATE: 16 BRPM | BODY MASS INDEX: 36.59 KG/M2 | HEIGHT: 66 IN | DIASTOLIC BLOOD PRESSURE: 80 MMHG | SYSTOLIC BLOOD PRESSURE: 156 MMHG | HEART RATE: 60 BPM | WEIGHT: 227.7 LBS | TEMPERATURE: 96.9 F

## 2017-04-06 DIAGNOSIS — C50.412 BREAST CANCER OF UPPER-OUTER QUADRANT OF LEFT FEMALE BREAST (H): Primary | ICD-10-CM

## 2017-04-06 DIAGNOSIS — C50.412 BREAST CANCER OF UPPER-OUTER QUADRANT OF LEFT FEMALE BREAST (H): ICD-10-CM

## 2017-04-06 LAB
ALBUMIN SERPL-MCNC: 3.7 G/DL (ref 3.4–5)
ALP SERPL-CCNC: 111 U/L (ref 40–150)
ALT SERPL W P-5'-P-CCNC: 21 U/L (ref 0–50)
ANION GAP SERPL CALCULATED.3IONS-SCNC: 8 MMOL/L (ref 3–14)
AST SERPL W P-5'-P-CCNC: 19 U/L (ref 0–45)
BASOPHILS # BLD AUTO: 0 10E9/L (ref 0–0.2)
BASOPHILS NFR BLD AUTO: 0.4 %
BILIRUB SERPL-MCNC: 0.8 MG/DL (ref 0.2–1.3)
BUN SERPL-MCNC: 12 MG/DL (ref 7–30)
CALCIUM SERPL-MCNC: 9.5 MG/DL (ref 8.5–10.1)
CHLORIDE SERPL-SCNC: 106 MMOL/L (ref 94–109)
CO2 SERPL-SCNC: 28 MMOL/L (ref 20–32)
CREAT SERPL-MCNC: 0.96 MG/DL (ref 0.52–1.04)
DIFFERENTIAL METHOD BLD: ABNORMAL
EOSINOPHIL # BLD AUTO: 0.1 10E9/L (ref 0–0.7)
EOSINOPHIL NFR BLD AUTO: 2.9 %
ERYTHROCYTE [DISTWIDTH] IN BLOOD BY AUTOMATED COUNT: 13.9 % (ref 10–15)
GFR SERPL CREATININE-BSD FRML MDRD: 56 ML/MIN/1.7M2
GLUCOSE SERPL-MCNC: 80 MG/DL (ref 70–99)
HCT VFR BLD AUTO: 43.6 % (ref 35–47)
HGB BLD-MCNC: 14.8 G/DL (ref 11.7–15.7)
IMM GRANULOCYTES # BLD: 0 10E9/L (ref 0–0.4)
IMM GRANULOCYTES NFR BLD: 0.4 %
LYMPHOCYTES # BLD AUTO: 0.7 10E9/L (ref 0.8–5.3)
LYMPHOCYTES NFR BLD AUTO: 14.4 %
MCH RBC QN AUTO: 30.8 PG (ref 26.5–33)
MCHC RBC AUTO-ENTMCNC: 33.9 G/DL (ref 31.5–36.5)
MCV RBC AUTO: 91 FL (ref 78–100)
MONOCYTES # BLD AUTO: 0.3 10E9/L (ref 0–1.3)
MONOCYTES NFR BLD AUTO: 6.3 %
NEUTROPHILS # BLD AUTO: 3.6 10E9/L (ref 1.6–8.3)
NEUTROPHILS NFR BLD AUTO: 75.6 %
NRBC # BLD AUTO: 0 10*3/UL
NRBC BLD AUTO-RTO: 0 /100
PLATELET # BLD AUTO: 148 10E9/L (ref 150–450)
POTASSIUM SERPL-SCNC: 4 MMOL/L (ref 3.4–5.3)
PROT SERPL-MCNC: 7.2 G/DL (ref 6.8–8.8)
RBC # BLD AUTO: 4.8 10E12/L (ref 3.8–5.2)
SODIUM SERPL-SCNC: 142 MMOL/L (ref 133–144)
WBC # BLD AUTO: 4.8 10E9/L (ref 4–11)

## 2017-04-06 PROCEDURE — 80053 COMPREHEN METABOLIC PANEL: CPT | Performed by: INTERNAL MEDICINE

## 2017-04-06 PROCEDURE — 36415 COLL VENOUS BLD VENIPUNCTURE: CPT | Performed by: INTERNAL MEDICINE

## 2017-04-06 PROCEDURE — 99212 OFFICE O/P EST SF 10 MIN: CPT | Mod: ZF

## 2017-04-06 PROCEDURE — 99213 OFFICE O/P EST LOW 20 MIN: CPT | Mod: ZP | Performed by: INTERNAL MEDICINE

## 2017-04-06 PROCEDURE — 85025 COMPLETE CBC W/AUTO DIFF WBC: CPT | Performed by: INTERNAL MEDICINE

## 2017-04-06 ASSESSMENT — PAIN SCALES - GENERAL: PAINLEVEL: NO PAIN (0)

## 2017-04-06 NOTE — MR AVS SNAPSHOT
"              After Visit Summary   4/6/2017    Elvia Amaro    MRN: [id number]           Patient Information     Date Of Birth          1940        Visit Information        Provider Department      4/6/2017 12:30 PM Elijah Padilla MD [facility]        Today's Diagnoses     Breast cancer of upper-outer quadrant of left female breast (H)    -  1       Follow-ups after your visit        Follow-up notes from your care team     Return in about 1 year (around 4/5/2018).      Your next 10 appointments already scheduled     Apr 10, 2017  4:15 PM CDT   [facility] with  [facility]   [facility]    [address] [facility] [address] [phone]              Who to contact     If you have questions or need follow up information about today's clinic visit or your schedule please contact [facility] directly at [phone].  Normal or non-critical lab and imaging results will be communicated to you by MyChart, letter or phone within 4 business days after the clinic has received the results. If you do not hear from us within 7 days, please contact the clinic through [employer] or phone. If you have a critical or abnormal lab result, we will notify you by phone as soon as possible.  Submit refill requests through [employer] or call your pharmacy and they will forward the refill request to us. Please allow 3 business days for your refill to be completed.          Additional Information About Your Visit        MyChart Information     [employer] lets you send messages to your doctor, view your test results, renew your prescriptions, schedule appointments and more. To sign up, go to [email or web address] . Click on \"Log in\" on the left side of the screen, which will take you to the Welcome page. Then click on \"Sign up Now\" on the right side of the page.     You will be asked to enter the access code listed below, as well as some " "personal information. Please follow the directions to create your username and password.     Your access code is: [email or web address]  Expires: 2017  4:56 PM     Your access code will  in 90 days. If you need help or a new code, please call your [facility]3] or [phone].        Care EveryWhere ID     This is your Care EveryWhere ID. This could be used by other organizations to access your [facility] medical records  [id number]        Your Vitals Were     Pulse Temperature Respirations Height Pulse Oximetry BMI (Body Mass Index)    60 96.9  F (36.1  C) (Oral) 16 1.664 m (5' 5.5\") 96% 37.31 kg/m2       Blood Pressure from Last 3 Encounters:   17 132/78   17 156/80   17 146/82    Weight from Last 3 Encounters:   17 103 kg (227 lb)   17 103.3 kg (227 lb 11.2 oz)   17 103.5 kg (228 lb 1.6 oz)                 Today's Medication Changes          These changes are accurate as of: 17 11:59 PM.  If you have any questions, ask your nurse or doctor.               These medicines have changed or have updated prescriptions.        Dose/Directions    warfarin 5 MG tablet   Commonly known as:  COUMADIN   This may have changed:  Another medication with the same name was removed. Continue taking this medication, and follow the directions you see here.   Used for:  Paroxysmal atrial fibrillation (H), Long term current use of anticoagulant therapy   Changed by:  Tej Tilley MD        Dose:  5 mg   Take 1 tablet (5 mg) by mouth daily   Quantity:  30 tablet   Refills:  0         Stop taking these medicines if you haven't already. Please contact your care team if you have questions.     cetirizine 10 MG tablet   Commonly known as:  zyrTEC   Stopped by:  Elijah Padilla MD                    Primary Care Provider Office Phone # Fax #    Tej Tilley [phone]       [facility] [address] [facility] [address]        Thank you!     Thank you for " [facility]  for your care. Our goal is always to provide you with excellent care. Hearing back from our patients is one way we can continue to improve our services. Please take a few minutes to complete the written survey that you may receive in the mail after your visit with us. Thank you!             Your Updated Medication List - Protect others around you: Learn how to safely use, store and throw away your medicines at www.disposemymeds.org.          This list is accurate as of: 4/6/17 11:59 PM.  Always use your most recent med list.                   Brand Name Dispense Instructions for use    ACE/ARB NOT PRESCRIBED (INTENTIONAL)      1 each every 15 minutes ACE & ARB not prescribed due to Symptomatic hypotension not due to excessive diuresis       amiodarone 200 MG tablet    PACERONE/CODARONE    90 tablet    TAKE 1 TABLET (200 MG) BY MOUTH DAILY       CALCIUM + D PO      Take  by mouth.       carvedilol 12.5 MG tablet    COREG    180 tablet    TAKE 1 TABLET BY MOUTH TWIC E A DAY WITH MEALS       meloxicam 7.5 MG tablet    MOBIC    90 tablet    TAKE 1 TABLET BY MOUTH EVER Y DAY       * order for DME     1 Device    Equipment being ordered: Elastic Sleeve  Prosthetics Fax [phone]       * order for DME     1 Device    Equipment being ordered: breast prosthesis and bras       VITAMIN D-1000 MAX ST PO      Take  by mouth.       warfarin 5 MG tablet    COUMADIN    30 tablet    Take 1 tablet (5 mg) by mouth daily       * Notice:  This list has 2 medication(s) that are the same as other medications prescribed for you. Read the directions carefully, and ask your doctor or other care provider to review them with you.

## 2017-04-06 NOTE — LETTER
4/6/2017       RE: Elvia Amaro  [address] [address] [place] [id number]     Dear Colleague,    Thank you for referring your patient, Elvia Amaro, to the [facility]. Please see a copy of my visit note below.    Ms. Amaro is a 75-year-old woman with a history of triple-negative infiltrating ductal carcinoma of the left breast diagnosed in 10/2010. The presentation was consistent with inflammatory breast cancer. She had a 4-cm mass in the upper outer quadrant of the left breast and some skin erythema as well as axillary lymphadenopathy on the left. Skin biopsy was negative for an infiltrating tumor cells. For neoadjuvant chemotherapy she received 4 cycles of dose-dense AC from October through December 2010 followed by dose-dense Taxol from December 2010 through February 2011. The tumor responded to the therapy with reduction in size of the breast mass and she had a near complete response, which placed her in the favorable RCB-1 category.   She underwent a left mastectomy on 03/21/2011 and had a left axillary lymph node dissection along with a TAHBSO due to an abnormal Pap smear. Pathology of the breast showed a minute focus of 0.2 cm of carcinoma which was grade 3 invasive ductal carcinoma, ER and WI negative, HER2 negative by immunohistochemistry and equivocal by FISH. The margins were negative. All 21 lymph nodes were negative. The uterus pathology showed an endometrial polyp and the ovaries and the fallopian tubes were normal.   Ms. Amaro went on to complete radiation therapy to the left chest wall, which she completed in July 2011.     FOLLOWUP NOTE      INTERVAL HISTORY:  Elvia returns to clinic.  She has been feeling entirely well.  She has occasional low back pain, but this is a chronic problem for her.  She has no fatigue, no depression, no anxiety.  She had a recent echocardiogram showing an ejection fraction of 60%, and she has had normal pulmonary function tests.      REVIEW  OF SYSTEMS:  She denies fevers or chills, cough, chest pain or shortness of breath, nausea or vomiting, constipation or diarrhea, bone pain, back pain or headache.  The remainder of a 10-point review of systems is negative.  She has no dyspnea on exertion.  No lower extremity edema.  She exercises for 1 hour 3 times a week at a local [employer].      PHYSICAL EXAMINATION:   VITAL SIGNS:  Blood pressure 156/80, temperature 96.9, pulse 60, respirations 16, O2 sat 96% on room air, height 1.66 meters and weight 103 kg.   GENERAL:  Elvia appeared generally well.  She has no alopecia.   HEENT:  Oropharynx is without lesions.   LYMPH:  There is no palpable cervical, supraclavicular, subclavicular or axillary lymphadenopathy.   BREASTS:  Examination of the right breast reveals no masses.  There is an area that she was concerned about where she felt some twinge of discomfort at the 10 o'clock position, 4 fingerbreadths from the nipple-areolar complex, and there is no abnormality palpable at that site.  The left anterior chest wall has a well-healed mastectomy incision without erythema or masses.  There is overlying telangiectasia, especially along the anterior axillary line.  No masses in the anterior chest wall bilaterally.   LUNGS:  Clear to percussion and auscultation.   HEART:  There is a regular rate and rhythm, S1, S2.   ABDOMEN:  Soft and nontender, consistent with increased body mass index.   EXTREMITIES:  Without edema.   PSYCHIATRIC:  Mood and affect were normal.   SKIN:  Examination of the right calf and the left shin both showed no lesions, and well-healed incisions where prior skin lesions had been removed.      LABORATORY DATA:  The CBC and CMP were within normal limits, except for a slightly lower GFR of 56 mL/minute.  The absolute lymphocyte count is slightly low at 700.  Her last INR was 2.3 on 03/27/2017.      Echocardiogram showed an ejection fraction of 60% on 02/03/2017.      ASSESSMENT AND PLAN:   1.   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  She was treated with neoadjuvant chemotherapy with 4 cycles of dose-dense AC followed by 4 cycles of dose-dense Taxol.  She went on to have a left mastectomy with clear margins.  There was a residual 0.2 cm focus of tumor present, which was grade 3.  The left axillary lymph node dissection revealed no lymph node involvement of 21 lymph nodes examined.  The pathologic stage was ypT1a N0 MX.  The tumor was RCB1 with a 10% risk of recurrence at 5 years.  She completed radiation therapy, and has had no evidence of recurrence at the 7-year divina.  Her mammogram from 10/2016 showed benign results, BI-RADS 1.   2.  Discussion of diet and exercise.  She is eating a low-fat diet.  She is exercising at a local gym, which is located in a [place] [place], and she exercises about 1 hour on Monday, Wednesday and Friday.  I commended her on her exercise schedule.   3.  Bone health.  She takes calcium and vitamin D.  Her DEXA scan from 03/26/2015 showed normal bone density with a most valid negative T score of -0.6.  I do not think we need to repeat the DEXA scan at the present time.   4.  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  The surgery was performed by Dr. Conor Brooks in Dermatology   5.  Atrial fibrillation.  Being managed by Dr. Kerr.  She is on amiodarone, meloxicam, carvedilol and warfarin.   6.  Followup.  We will see Elvia in followup in our clinic in 6 months with Megan Rodríguez, and a mammogram will be performed.  We will see her in followup with me in 1 year. Follow up with Megan Rodríguez 10-5-17 with a right mammogram, CBC, CMP.  Follow up with me April 5, 2018 with CBC, CMP.        Thank you for allowing us to continue to  participate in Elvia Amaro's care.      Elijah Padilla MD          [facility]       I spent 20 minutes with the patient more than 50% of which was in counseling and coordination of care.     Again, thank you for allowing me to participate in the care of your patient.      Sincerely,    Elijah Padilla MD

## 2017-04-06 NOTE — NURSING NOTE
"Elvia Amaro is a 76 year old female who presents for:  Chief Complaint   Patient presents with     Oncology Clinic Visit     Return: Breast Ca        Initial Vitals:  /80 (BP Location: Right arm, Patient Position: Chair, Cuff Size: Adult Large)  Pulse 60  Temp 96.9  F (36.1  C) (Oral)  Resp 16  Ht 1.664 m (5' 5.5\")  Wt 103.3 kg (227 lb 11.2 oz)  SpO2 96%  BMI 37.31 kg/m2 Estimated body mass index is 37.31 kg/(m^2) as calculated from the following:    Height as of this encounter: 1.664 m (5' 5.5\").    Weight as of this encounter: 103.3 kg (227 lb 11.2 oz).. Body surface area is 2.18 meters squared. BP completed using cuff size: BP was taken in LAB INTAKE  No Pain (0) No LMP recorded. Patient is postmenopausal. Allergies and medications reviewed.     Medications: Medication refills not needed today.  Pharmacy name entered into EPIC:    [id number] - [place], [place] - [address] [place] [address] PHARMACY MAIL DELIVERY - [facility] [id number]    Comments:     6 minutes for nursing intake (face to face time)   Marge Simon CMA          "

## 2017-04-07 ENCOUNTER — OFFICE VISIT (OUTPATIENT)
Dept: FAMILY MEDICINE | Facility: OTHER | Age: 77
End: 2017-04-07
Payer: COMMERCIAL

## 2017-04-07 VITALS
HEART RATE: 64 BPM | BODY MASS INDEX: 36.48 KG/M2 | WEIGHT: 227 LBS | HEIGHT: 66 IN | DIASTOLIC BLOOD PRESSURE: 78 MMHG | OXYGEN SATURATION: 96 % | RESPIRATION RATE: 16 BRPM | SYSTOLIC BLOOD PRESSURE: 132 MMHG | TEMPERATURE: 97.7 F

## 2017-04-07 DIAGNOSIS — E03.1 CONGENITAL HYPOTHYROIDISM WITHOUT GOITER: ICD-10-CM

## 2017-04-07 DIAGNOSIS — I50.21 ACUTE SYSTOLIC HEART FAILURE (H): Primary | ICD-10-CM

## 2017-04-07 DIAGNOSIS — R60.0 LOCALIZED EDEMA: ICD-10-CM

## 2017-04-07 DIAGNOSIS — I48.0 PAROXYSMAL ATRIAL FIBRILLATION (H): ICD-10-CM

## 2017-04-07 LAB
T4 FREE SERPL-MCNC: 1.53 NG/DL (ref 0.76–1.46)
TSH SERPL DL<=0.005 MIU/L-ACNC: 9.35 MU/L (ref 0.4–4)

## 2017-04-07 PROCEDURE — 84439 ASSAY OF FREE THYROXINE: CPT | Performed by: FAMILY MEDICINE

## 2017-04-07 PROCEDURE — 99213 OFFICE O/P EST LOW 20 MIN: CPT | Performed by: FAMILY MEDICINE

## 2017-04-07 PROCEDURE — 36415 COLL VENOUS BLD VENIPUNCTURE: CPT | Performed by: FAMILY MEDICINE

## 2017-04-07 PROCEDURE — 84443 ASSAY THYROID STIM HORMONE: CPT | Performed by: FAMILY MEDICINE

## 2017-04-07 RX ORDER — FUROSEMIDE 20 MG
TABLET ORAL
Qty: 90 TABLET | Refills: 3 | Status: SHIPPED | OUTPATIENT
Start: 2017-04-07 | End: 2018-03-05

## 2017-04-07 RX ORDER — LEVOTHYROXINE SODIUM 100 UG/1
100 TABLET ORAL DAILY
Qty: 90 TABLET | Refills: 3 | Status: SHIPPED | OUTPATIENT
Start: 2017-04-07 | End: 2017-04-10

## 2017-04-07 NOTE — PROGRESS NOTES
SUBJECTIVE:                                                    Elvia Amaro is a 76 year old female who presents to clinic today for the following health issues:    Hypothyroidism Follow-up      Since last visit, patient describes the following symptoms: Weight stable, no hair loss, no skin changes, no constipation, no loose stools       Amount of exercise or physical activity: None    Problems taking medications regularly: No    Medication side effects: none    Diet: regular (no restrictions)      Heart Failure Follow-up    Symptoms:    Shortness of breath: none    Lower extremity edema: stable     Chest pain: No    Using more pillows than normal: No    Cough at night: No    Weight:    Checking weight daily: Yes    Weight change: none    Cardiology visits, ER/UC, or hospital admissions since last visit: Cardiology Visit - 2/23/17 with Dr. Ortiz    Medication side effects: none       Hypothyroidism Follow-up      Since last visit, patient describes the following symptoms: Weight stable, no hair loss, no skin changes, no constipation, no loose stools       Problem list and histories reviewed & adjusted, as indicated.  Additional history: as documented    Patient Active Problem List   Diagnosis     Endometrial hyperplasia     CARDIOVASCULAR SCREENING; LDL GOAL LESS THAN 130     Hypertension goal BP (blood pressure) < 140/90     Advanced directives, counseling/discussion     Skin exam, screening for cancer     Atrial fibrillation (H)     Acute systolic heart failure (H)     Paroxysmal atrial fibrillation (H)     Congenital hypothyroidism without goiter     Edema     Long-term (current) use of anticoagulants [Z79.01]     Breast cancer of upper-outer quadrant of left female breast (H)     Past Surgical History:   Procedure Laterality Date     ANESTHESIA CARDIOVERSION N/A 9/18/2015    Procedure: ANESTHESIA CARDIOVERSION;  Surgeon: GENERIC ANESTHESIA PROVIDER;  Location: [facility] OR     BIOPSY SKIN (LOCATION)       left leg     BREAST SURGERY       COLONOSCOPY  8/29/2011    Procedure:COLONOSCOPY; colonoscopy; Surgeon:VICKIE DUFF; Location:[facility]     HC HYSTEROSCOPY W ENDOMETRIAL BX/POLYPECTOMY W/WO D&C  9/30/2005    Hysteroscopy. D&C. Cervical biopsies.     HC REMOVAL OF TONSILS,<11 Y/O      Tonsils <10y.o.     LAPAROSCOPIC HYSTERECTOMY TOTAL, BILATERAL SALPINGO-OOPHORECTOMY, COMBINED  3/21/11     MASTECTOMY  3/21/11    left mastectomy, lymph node dissection     MOHS MICROGRAPHIC PROCEDURE       NO HISTORY OF SURGERY       REMOVE CATHETER VASCULAR ACCESS  5/4/2011    Procedure:REMOVE CATHETER VASCULAR ACCESS; right subclavian; Surgeon:ESTER ROPER; Location:[facility] OR     VASCULAR SURGERY      port       Social History   Substance Use Topics     Smoking status: Never Smoker     Smokeless tobacco: Never Used     Alcohol use No     Family History   Problem Relation Age of Onset     DIABETES Father      CEREBROVASCULAR DISEASE Father      HEART DISEASE Mother      MI age 68     CANCER No family hx of      no skin cancer         Current Outpatient Prescriptions   Medication Sig Dispense Refill     levothyroxine (SYNTHROID/LEVOTHROID) 100 MCG tablet Take 1 tablet (100 mcg) by mouth daily 90 tablet 3     furosemide (LASIX) 20 MG tablet TAKE 1 TABLET BY MOUTH EVER Y DAY 90 tablet 3     meloxicam (MOBIC) 7.5 MG tablet TAKE 1 TABLET BY MOUTH EVER Y DAY 90 tablet 3     amiodarone (PACERONE/CODARONE) 200 MG tablet TAKE 1 TABLET (200 MG) BY MOUTH DAILY 90 tablet 3     carvedilol (COREG) 12.5 MG tablet TAKE 1 TABLET BY MOUTH TWIC E A DAY WITH MEALS 180 tablet 3     warfarin (COUMADIN) 5 MG tablet Take 1 tablet (5 mg) by mouth daily 30 tablet 0     order for DME Equipment being ordered: breast prosthesis and bras 1 Device 0     order for DME Equipment being ordered: Elastic Sleeve   Prosthetics Fax [phone] 1 Device 1     ACE/ARB NOT PRESCRIBED, INTENTIONAL, 1 each every 15 minutes ACE & ARB not prescribed due to Symptomatic  "hypotension not due to excessive diuresis       Calcium Carbonate-Vitamin D (CALCIUM + D PO) Take  by mouth.       Cholecalciferol (VITAMIN D-1000 MAX ST PO) Take  by mouth.       [DISCONTINUED] levothyroxine (SYNTHROID/LEVOTHROID) 100 MCG tablet TAKE 1 TABLET BY MOUTH ONCE DAILY 30 tablet 0     [DISCONTINUED] furosemide (LASIX) 20 MG tablet TAKE 1 TABLET BY MOUTH EVER Y DAY 90 tablet 1     Allergies   Allergen Reactions     Bacitracin      Skin Irritation     Cephalexin Hcl Itching     Clindamycin Hcl Itching     Recent Labs   Lab Test  04/06/17   1158  10/06/16   1224  09/28/16   1448  06/20/16   1514  06/01/16   0937   07/24/13   0801   LDL   --    --    --    --    --    --   124   HDL   --    --    --    --    --    --   42*   TRIG   --    --    --    --    --    --   63   ALT  21  24   --    --   23   < >   --    CR  0.96  0.96   --    --   0.87   < >   --    GFRESTIMATED  56*  57*   --    --   63   < >   --    GFRESTBLACK  68  68   --    --   76   < >   --    POTASSIUM  4.0  4.0   --    --   4.2   < >   --    TSH   --    --   6.32*  4.76*   --    < >   --     < > = values in this interval not displayed.      BP Readings from Last 3 Encounters:   04/07/17 132/78   04/06/17 156/80   02/06/17 146/82    Wt Readings from Last 3 Encounters:   04/07/17 227 lb (103 kg)   04/06/17 227 lb 11.2 oz (103.3 kg)   02/06/17 228 lb 1.6 oz (103.5 kg)                  Labs reviewed in EPIC    Reviewed and updated as needed this visit by clinical staff  Tobacco  Allergies  Meds  Soc Hx      Reviewed and updated as needed this visit by Provider         ROS:  Constitutional, HEENT, cardiovascular, pulmonary, gi and gu systems are negative, except as otherwise noted.    OBJECTIVE:                                                    /78 (BP Location: Right arm, Patient Position: Chair, Cuff Size: Adult Large)  Pulse 64  Temp 97.7  F (36.5  C) (Tympanic)  Resp 16  Ht 5' 5.5\" (1.664 m)  Wt 227 lb (103 kg)  SpO2 96%  " Breastfeeding? No  BMI 37.2 kg/m2  Body mass index is 37.2 kg/(m^2).  GENERAL: healthy, alert and no distress  NECK: no adenopathy, no asymmetry, masses, or scars and thyroid normal to palpation  RESP: lungs clear to auscultation - no rales, rhonchi or wheezes  CV: regular rate and rhythm, normal S1 S2, no S3 or S4, no murmur, click or rub, no peripheral edema and peripheral pulses strong  ABDOMEN: soft, nontender, no hepatosplenomegaly, no masses and bowel sounds normal  MS: no gross musculoskeletal defects noted, no edema    Diagnostic Test Results:  Results for orders placed or performed in visit on 04/06/17 (from the past 24 hour(s))   CBC with platelets differential   Result Value Ref Range    WBC 4.8 4.0 - 11.0 10e9/L    RBC Count 4.80 3.8 - 5.2 10e12/L    Hemoglobin 14.8 11.7 - 15.7 g/dL    Hematocrit 43.6 35.0 - 47.0 %    MCV 91 78 - 100 fl    MCH 30.8 26.5 - 33.0 pg    MCHC 33.9 31.5 - 36.5 g/dL    RDW 13.9 10.0 - 15.0 %    Platelet Count 148 (L) 150 - 450 10e9/L    Diff Method Automated Method     % Neutrophils 75.6 %    % Lymphocytes 14.4 %    % Monocytes 6.3 %    % Eosinophils 2.9 %    % Basophils 0.4 %    % Immature Granulocytes 0.4 %    Nucleated RBCs 0 0 /100    Absolute Neutrophil 3.6 1.6 - 8.3 10e9/L    Absolute Lymphocytes 0.7 (L) 0.8 - 5.3 10e9/L    Absolute Monocytes 0.3 0.0 - 1.3 10e9/L    Absolute Eosinophils 0.1 0.0 - 0.7 10e9/L    Absolute Basophils 0.0 0.0 - 0.2 10e9/L    Abs Immature Granulocytes 0.0 0 - 0.4 10e9/L    Absolute Nucleated RBC 0.0    Comprehensive metabolic panel   Result Value Ref Range    Sodium 142 133 - 144 mmol/L    Potassium 4.0 3.4 - 5.3 mmol/L    Chloride 106 94 - 109 mmol/L    Carbon Dioxide 28 20 - 32 mmol/L    Anion Gap 8 3 - 14 mmol/L    Glucose 80 70 - 99 mg/dL    Urea Nitrogen 12 7 - 30 mg/dL    Creatinine 0.96 0.52 - 1.04 mg/dL    GFR Estimate 56 (L) >60 mL/min/1.7m2    GFR Estimate If Black 68 >60 mL/min/1.7m2    Calcium 9.5 8.5 - 10.1 mg/dL    Bilirubin Total  0.8 0.2 - 1.3 mg/dL    Albumin 3.7 3.4 - 5.0 g/dL    Protein Total 7.2 6.8 - 8.8 g/dL    Alkaline Phosphatase 111 40 - 150 U/L    ALT 21 0 - 50 U/L    AST 19 0 - 45 U/L        ASSESSMENT/PLAN:                                                    Heart Failure: Reduced (HFr)- EF:40% - 44%; chronic, controlled   Associated with the following complications    Arrythmia   Plan:  No changes in the patient's current treatment plan  See patient instructions for Heart Failure Action Plan  Labs:   Previously completed    Hypothyroidism; borderline euthyroid   Plan:  No changes in the patient's current treatment plan  Labs:  TSH and Free T4        2. Localized edema  Chronic due to HF, well managed with salt restriction and daily lasix. The current medical regimen is effective;  continue present plan and medications.  - furosemide (LASIX) 20 MG tablet; TAKE 1 TABLET BY MOUTH EVER Y DAY  Dispense: 90 tablet; Refill: 3          FUTURE APPOINTMENTS:       - Follow-up visit in 6 months    Tej Tilley MD  [facility]

## 2017-04-07 NOTE — MR AVS SNAPSHOT
After Visit Summary   4/7/2017    Elvia Amaro    MRN: [id number]           Patient Information     Date Of Birth          1940        Visit Information        Provider Department      4/7/2017 8:20 AM Tej Tilley MD [facility]        Today's Diagnoses     Congenital hypothyroidism without goiter        Localized edema           Follow-ups after your visit        Follow-up notes from your care team     Return in about 6 months (around 10/7/2017) for Routine Visit.      Your next 10 appointments already scheduled     Apr 10, 2017  4:15 PM CDT   [facility] with  [facility]   [facility]    [address] [facility] [id number] [phone]              Future tests that were ordered for you today     Open Standing Orders        Priority Remaining Interval Expires Ordered    Comprehensive metabolic panel Routine 52/52  4/6/2018 4/6/2017    CBC with platelets differential Routine 52/52  4/6/2018 4/6/2017    CBC with platelets differential Routine 51/52  4/5/2018 4/5/2017    Comprehensive metabolic panel Routine 51/52  4/5/2018 4/5/2017            Who to contact     If you have questions or need follow up information about today's clinic visit or your schedule please contact [facility] directly at [phone].  Normal or non-critical lab and imaging results will be communicated to you by MyChart, letter or phone within 4 business days after the clinic has received the results. If you do not hear from us within 7 days, please contact the clinic through MyChart or phone. If you have a critical or abnormal lab result, we will notify you by phone as soon as possible.  Submit refill requests through [employer] or call your pharmacy and they will forward the refill request to us. Please allow 3 business days for your refill to be completed.          Additional Information About Your Visit        MyChart Information     [employer]  "lets you send messages to your doctor, view your test results, renew your prescriptions, schedule appointments and more. To sign up, go to [email or web address] . Click on \"Log in\" on the left side of the screen, which will take you to the Welcome page. Then click on \"Sign up Now\" on the right side of the page.     You will be asked to enter the access code listed below, as well as some personal information. Please follow the directions to create your username and password.     Your access code is: [email or web address]  Expires: 2017  4:56 PM     Your access code will  in 90 days. If you need help or a new code, please call your [place] clinic or [phone].        Care EveryWhere ID     This is your Care EveryWhere ID. This could be used by other organizations to access your [place] medical records  [id number]        Your Vitals Were     Pulse Temperature Respirations Height Pulse Oximetry Breastfeeding?    64 97.7  F (36.5  C) (Tympanic) 16 5' 5.5\" (1.664 m) 96% No    BMI (Body Mass Index)                   37.2 kg/m2            Blood Pressure from Last 3 Encounters:   17 132/78   17 156/80   17 146/82    Weight from Last 3 Encounters:   17 227 lb (103 kg)   17 227 lb 11.2 oz (103.3 kg)   17 228 lb 1.6 oz (103.5 kg)              We Performed the Following     TSH with free T4 reflex          Today's Medication Changes          These changes are accurate as of: 17  8:45 AM.  If you have any questions, ask your nurse or doctor.               These medicines have changed or have updated prescriptions.        Dose/Directions    furosemide 20 MG tablet   Commonly known as:  LASIX   This may have changed:  See the new instructions.   Used for:  Localized edema   Changed by:  Tej Tilley MD        TAKE 1 TABLET BY MOUTH EVER Y DAY   Quantity:  90 tablet   Refills:  3       levothyroxine 100 MCG tablet   Commonly known as:  SYNTHROID/LEVOTHROID   This may have changed: "  See the new instructions.   Used for:  Congenital hypothyroidism without goiter   Changed by:  Tej Tilley MD        Dose:  100 mcg   Take 1 tablet (100 mcg) by mouth daily   Quantity:  90 tablet   Refills:  3            Where to get your medicines      These medications were sent to [facility] - [place], [place] - [address]  [address] [facility] [address]     Phone:  [phone]     furosemide 20 MG tablet    levothyroxine 100 MCG tablet                Primary Care Provider Office Phone # Fax #    Tej Tilley [phone]       [facility] [address] [facility] [address]        Thank you!     Thank you for choosing [facility]  for your care. Our goal is always to provide you with excellent care. Hearing back from our patients is one way we can continue to improve our services. Please take a few minutes to complete the written survey that you may receive in the mail after your visit with us. Thank you!             Your Updated Medication List - Protect others around you: Learn how to safely use, store and throw away your medicines at www.disposemymeds.org.          This list is accurate as of: 4/7/17  8:45 AM.  Always use your most recent med list.                   Brand Name Dispense Instructions for use    ACE/ARB NOT PRESCRIBED (INTENTIONAL)      1 each every 15 minutes ACE & ARB not prescribed due to Symptomatic hypotension not due to excessive diuresis       amiodarone 200 MG tablet    PACERONE/CODARONE    90 tablet    TAKE 1 TABLET (200 MG) BY MOUTH DAILY       CALCIUM + D PO      Take  by mouth.       carvedilol 12.5 MG tablet    COREG    180 tablet    TAKE 1 TABLET BY MOUTH TWIC E A DAY WITH MEALS       furosemide 20 MG tablet    LASIX    90 tablet    TAKE 1 TABLET BY MOUTH EVER Y DAY       levothyroxine 100 MCG tablet    SYNTHROID/LEVOTHROID    90 tablet    Take 1 tablet (100 mcg) by mouth daily       meloxicam 7.5 MG  tablet    MOBIC    90 tablet    TAKE 1 TABLET BY MOUTH EVER Y DAY       * order for DME     1 Device    Equipment being ordered: Elastic Sleeve  Prosthetics Fax [phone]       * order for DME     1 Device    Equipment being ordered: breast prosthesis and bras       VITAMIN D-1000 MAX ST PO      Take  by mouth.       warfarin 5 MG tablet    COUMADIN    30 tablet    Take 1 tablet (5 mg) by mouth daily       * Notice:  This list has 2 medication(s) that are the same as other medications prescribed for you. Read the directions carefully, and ask your doctor or other care provider to review them with you.

## 2017-04-10 ENCOUNTER — ANTICOAGULATION THERAPY VISIT (OUTPATIENT)
Dept: ANTICOAGULATION | Facility: OTHER | Age: 77
End: 2017-04-10
Payer: COMMERCIAL

## 2017-04-10 DIAGNOSIS — I48.91 ATRIAL FIBRILLATION, UNSPECIFIED TYPE (H): ICD-10-CM

## 2017-04-10 DIAGNOSIS — Z79.01 LONG-TERM (CURRENT) USE OF ANTICOAGULANTS: ICD-10-CM

## 2017-04-10 LAB — INR POINT OF CARE: 2.2 (ref 0.86–1.14)

## 2017-04-10 PROCEDURE — 99207 ZZC NO CHARGE NURSE ONLY: CPT

## 2017-04-10 PROCEDURE — 36416 COLLJ CAPILLARY BLOOD SPEC: CPT

## 2017-04-10 PROCEDURE — 85610 PROTHROMBIN TIME: CPT | Mod: QW

## 2017-04-10 RX ORDER — LEVOTHYROXINE SODIUM 100 UG/1
100 TABLET ORAL DAILY
Qty: 30 TABLET | Refills: 0 | Status: SHIPPED | OUTPATIENT
Start: 2017-04-10 | End: 2017-10-17

## 2017-04-10 NOTE — MR AVS SNAPSHOT
Elvia Amaro   4/10/2017 4:15 PM   Anticoagulation Therapy Visit    Description:  76 year old female   Provider:  [facility]   Department:  [facility]           INR as of 4/10/2017     Today's INR 2.2      Anticoagulation Summary as of 4/10/2017     INR goal 2.0-3.0   Today's INR 2.2   Full instructions 1.25 mg on Tue, Sat; 2.5 mg all other days   Next INR check 5/1/2017    Indications   Long-term (current) use of anticoagulants [Z79.01] [Z79.01]  Atrial fibrillation (H) [I48.91]         Your next Anticoagulation Clinic appointment(s)     Apr 10, 2017  4:15 PM CDT   [facility]  [facility]    [address] [facility] [address]   [phone]            May 01, 2017  9:15 AM CDT   Anticoagulation Visit with  ANTI COAG   [facility]    [address] [facility] [address]   [phone]              Contact Numbers     Clinic Number:         April 2017 Details    Sun Mon Tue Wed Thu Fri Sat           1                 2               3               4               5               6               7               8                 9               10      2.5 mg   See details      11      1.25 mg         12      2.5 mg         13      2.5 mg         14      2.5 mg         15      1.25 mg           16      2.5 mg         17      2.5 mg         18      1.25 mg         19      2.5 mg         20      2.5 mg         21      2.5 mg         22      1.25 mg           23      2.5 mg         24      2.5 mg         25      1.25 mg         26      2.5 mg         27      2.5 mg         28      2.5 mg         29      1.25 mg           30      2.5 mg                Date Details   04/10 This INR check               How to take your warfarin dose     To take:  1.25 mg Take 0.5 of a 2.5 mg tablet.    To take:  2.5 mg Take 1 of the 2.5 mg tablets.           May 2017 Details    Sun Mon Tue Wed Thu Fri Sat      1             2               3               4               5               6                 7               8               9               10               11               12               13                 14               15               16               17               18               19               20                 21               22               23               24               25               26               27                 28               29               30               31                   Date Details   No additional details    Date of next INR:  5/1/2017         How to take your warfarin dose     To take:  2.5 mg Take 1 of the 2.5 mg tablets.

## 2017-04-10 NOTE — PROGRESS NOTES
ANTICOAGULATION FOLLOW-UP CLINIC VISIT    Patient Name:  Elvia Amaro  Date:  4/10/2017  Contact Type:  Face to Face    SUBJECTIVE:     Patient Findings     Positives No Problem Findings           OBJECTIVE    INR Protime   Date Value Ref Range Status   04/10/2017 2.2 (A) 0.86 - 1.14 Final       ASSESSMENT / PLAN  INR assessment THER    Recheck INR In: 3 WEEKS    INR Location Clinic      Anticoagulation Summary as of 4/10/2017     INR goal 2.0-3.0   Today's INR 2.2   Maintenance plan 1.25 mg (2.5 mg x 0.5) on Tue, Sat; 2.5 mg (2.5 mg x 1) all other days   Full instructions 1.25 mg on Tue, Sat; 2.5 mg all other days   Weekly total 15 mg   No change documented Campos Carrera RN   Plan last modified Campos Carrera RN (3/6/2017)   Next INR check 5/1/2017   Target end date     Indications   Long-term (current) use of anticoagulants [Z79.01] [Z79.01]  Atrial fibrillation (H) [I48.91]         Anticoagulation Episode Summary     INR check location     Preferred lab     Send INR reminders to [facility]    Comments       Anticoagulation Care Providers     Provider Role Specialty Phone number    Tej Tilley MD [facility] [phone]            See the Encounter Report to view Anticoagulation Flowsheet and Dosing Calendar (Go to Encounters tab in chart review, and find the Anticoagulation Therapy Visit)    Dosage adjustment made based on physician directed care plan.    Campos Carrera, RN

## 2017-04-11 ENCOUNTER — HOSPITAL ENCOUNTER (OUTPATIENT)
Dept: ULTRASOUND IMAGING | Facility: CLINIC | Age: 77
Discharge: HOME | End: 2017-04-11
Attending: FAMILY MEDICINE | Admitting: FAMILY MEDICINE
Payer: MEDICARE

## 2017-04-11 DIAGNOSIS — I48.0 PAROXYSMAL ATRIAL FIBRILLATION (H): ICD-10-CM

## 2017-04-11 DIAGNOSIS — R60.0 LOCALIZED EDEMA: ICD-10-CM

## 2017-04-11 DIAGNOSIS — E03.1 CONGENITAL HYPOTHYROIDISM WITHOUT GOITER: ICD-10-CM

## 2017-04-11 DIAGNOSIS — I50.21 ACUTE SYSTOLIC HEART FAILURE (H): ICD-10-CM

## 2017-04-11 PROCEDURE — 76536 US EXAM OF HEAD AND NECK: CPT

## 2017-05-04 ENCOUNTER — ANTICOAGULATION THERAPY VISIT (OUTPATIENT)
Dept: ANTICOAGULATION | Facility: OTHER | Age: 77
End: 2017-05-04
Payer: COMMERCIAL

## 2017-05-04 DIAGNOSIS — I48.91 ATRIAL FIBRILLATION, UNSPECIFIED TYPE (H): ICD-10-CM

## 2017-05-04 DIAGNOSIS — Z79.01 LONG-TERM (CURRENT) USE OF ANTICOAGULANTS: ICD-10-CM

## 2017-05-04 LAB — INR POINT OF CARE: 2.4 (ref 0.86–1.14)

## 2017-05-04 PROCEDURE — 85610 PROTHROMBIN TIME: CPT | Mod: QW

## 2017-05-04 PROCEDURE — 36416 COLLJ CAPILLARY BLOOD SPEC: CPT

## 2017-05-04 PROCEDURE — 99207 ZZC NO CHARGE NURSE ONLY: CPT

## 2017-05-04 NOTE — MR AVS SNAPSHOT
Elvianoel Amaro   5/4/2017 2:15 PM   Anticoagulation Therapy Visit    Description:  76 year old female   Provider:  [facility]   Department:  [facility]           INR as of 5/4/2017     Today's INR 2.4      Anticoagulation Summary as of 5/4/2017     INR goal 2.0-3.0   Today's INR 2.4   Full instructions 1.25 mg on Tue, Sat; 2.5 mg all other days   Next INR check 6/5/2017    Indications   Long-term (current) use of anticoagulants [Z79.01] [Z79.01]  Atrial fibrillation (H) [I48.91]         Your next Anticoagulation Clinic appointment(s)     Jun 05, 2017  9:15 AM CDT   [facility] with [facility]   [facility]    [address] [facility] [id number] [phone]              Contact Numbers     Clinic Number:         May 2017 Details    Sun Mon Tue Wed Thu Fri Sat      1               2               3               4      2.5 mg   See details      5      2.5 mg         6      1.25 mg           7      2.5 mg         8      2.5 mg         9      1.25 mg         10      2.5 mg         11      2.5 mg         12      2.5 mg         13      1.25 mg           14      2.5 mg         15      2.5 mg         16      1.25 mg         17      2.5 mg         18      2.5 mg         19      2.5 mg         20      1.25 mg           21      2.5 mg         22      2.5 mg         23      1.25 mg         24      2.5 mg         25      2.5 mg         26      2.5 mg         27      1.25 mg           28      2.5 mg         29      2.5 mg         30      1.25 mg         31      2.5 mg             Date Details   05/04 This INR check               How to take your warfarin dose     To take:  1.25 mg Take 0.5 of a 2.5 mg tablet.    To take:  2.5 mg Take 1 of the 2.5 mg tablets.           June 2017 Details    Sun Mon Tue Wed Thu Fri Sat         1      2.5 mg         2      2.5 mg         3      1.25 mg           4      2.5 mg         5            6               7               8                9               10                 11               12               13               14               15               16               17                 18               19               20               21               22               23               24                 25               26               27               28               29               30                 Date Details   No additional details    Date of next INR:  6/5/2017         How to take your warfarin dose     To take:  1.25 mg Take 0.5 of a 2.5 mg tablet.    To take:  2.5 mg Take 1 of the 2.5 mg tablets.

## 2017-05-04 NOTE — PROGRESS NOTES
ANTICOAGULATION FOLLOW-UP CLINIC VISIT    Patient Name:  Elvia Amaro  Date:  5/4/2017  Contact Type:  Face to Face    SUBJECTIVE:     Patient Findings     Positives No Problem Findings           OBJECTIVE    INR Protime   Date Value Ref Range Status   05/04/2017 2.4 (A) 0.86 - 1.14 Final       ASSESSMENT / PLAN  INR assessment THER    Recheck INR In: 4 WEEKS    INR Location Clinic      Anticoagulation Summary as of 5/4/2017     INR goal 2.0-3.0   Today's INR 2.4   Maintenance plan 1.25 mg (2.5 mg x 0.5) on Tue, Sat; 2.5 mg (2.5 mg x 1) all other days   Full instructions 1.25 mg on Tue, Sat; 2.5 mg all other days   Weekly total 15 mg   No change documented Campos Carrera RN   Plan last modified Campos Carrera RN (3/6/2017)   Next INR check 6/5/2017   Target end date     Indications   Long-term (current) use of anticoagulants [Z79.01] [Z79.01]  Atrial fibrillation (H) [I48.91]         Anticoagulation Episode Summary     INR check location     Preferred lab     Send INR reminders to [facility]    Comments       Anticoagulation Care Providers     Provider Role Specialty Phone number    Tej Tilley MD [facility] [phone]            See the Encounter Report to view Anticoagulation Flowsheet and Dosing Calendar (Go to Encounters tab in chart review, and find the Anticoagulation Therapy Visit)    Dosage adjustment made based on physician directed care plan.    Campos Carrera, RN

## 2017-05-09 DIAGNOSIS — I48.0 PAROXYSMAL ATRIAL FIBRILLATION (H): ICD-10-CM

## 2017-05-09 DIAGNOSIS — Z79.01 LONG TERM CURRENT USE OF ANTICOAGULANT THERAPY: ICD-10-CM

## 2017-05-10 RX ORDER — WARFARIN SODIUM 2.5 MG/1
TABLET ORAL
Qty: 80 TABLET | Refills: 0 | Status: SHIPPED | OUTPATIENT
Start: 2017-05-10 | End: 2017-07-12

## 2017-05-10 NOTE — TELEPHONE ENCOUNTER
Warfarin    Last Written Prescription Date: 1/23/17  Last Fill Qty: 30, # refills: 0  Last Office Visit with [facility] or [facility] prescribing provider: 4/7/17       Date and Result of Last PT/INR:   Lab Results   Component Value Date    INR 2.4 05/04/2017    INR 2.2 04/10/2017    INR 1.18 12/02/2015    INR 1.18 03/21/2011

## 2017-06-05 ENCOUNTER — ANTICOAGULATION THERAPY VISIT (OUTPATIENT)
Dept: ANTICOAGULATION | Facility: OTHER | Age: 77
End: 2017-06-05
Payer: COMMERCIAL

## 2017-06-05 DIAGNOSIS — Z79.01 LONG-TERM (CURRENT) USE OF ANTICOAGULANTS: ICD-10-CM

## 2017-06-05 DIAGNOSIS — I48.91 ATRIAL FIBRILLATION, UNSPECIFIED TYPE (H): ICD-10-CM

## 2017-06-05 LAB — INR POINT OF CARE: 2.4 (ref 0.86–1.14)

## 2017-06-05 PROCEDURE — 85610 PROTHROMBIN TIME: CPT | Mod: QW

## 2017-06-05 PROCEDURE — 36416 COLLJ CAPILLARY BLOOD SPEC: CPT

## 2017-06-05 PROCEDURE — 99207 ZZC NO CHARGE NURSE ONLY: CPT

## 2017-06-05 NOTE — MR AVS SNAPSHOT
Elvianoel Amaro   6/5/2017 9:15 AM   Anticoagulation Therapy Visit    Description:  76 year old female   Provider:  [facility]   Department:  [facility]           INR as of 6/5/2017     Today's INR 2.4      Anticoagulation Summary as of 6/5/2017     INR goal 2.0-3.0   Today's INR 2.4   Full instructions 1.25 mg on Tue, Sat; 2.5 mg all other days   Next INR check 7/7/2017    Indications   Long-term (current) use of anticoagulants [Z79.01] [Z79.01]  Atrial fibrillation (H) [I48.91]         Your next Anticoagulation Clinic appointment(s)     Jul 07, 2017  9:15 AM CDT   Anticoagulation Visit with [facility] ANTI PAZ   [facility] ([facility]    [address] [facility] [id number] [phone]              Contact Numbers     Clinic Number:         June 2017 Details    Sun Mon Tue Wed Thu Fri Sat         1               2               3                 4               5      2.5 mg   See details      6      1.25 mg         7      2.5 mg         8      2.5 mg         9      2.5 mg         10      1.25 mg           11      2.5 mg         12      2.5 mg         13      1.25 mg         14      2.5 mg         15      2.5 mg         16      2.5 mg         17      1.25 mg           18      2.5 mg         19      2.5 mg         20      1.25 mg         21      2.5 mg         22      2.5 mg         23      2.5 mg         24      1.25 mg           25      2.5 mg         26      2.5 mg         27      1.25 mg         28      2.5 mg         29      2.5 mg         30      2.5 mg           Date Details   06/05 This INR check               How to take your warfarin dose     To take:  1.25 mg Take 0.5 of a 2.5 mg tablet.    To take:  2.5 mg Take 1 of the 2.5 mg tablets.           July 2017 Details    Sun Mon Tue Wed Thu Fri Sat           1      1.25 mg           2      2.5 mg         3      2.5 mg         4      1.25 mg         5      2.5 mg         6      2.5 mg         7            8                 9                10               11               12               13               14               15                 16               17               18               19               20               21               22                 23               24               25               26               27               28               29                 30               31                     Date Details   No additional details    Date of next INR:  7/7/2017         How to take your warfarin dose     To take:  1.25 mg Take 0.5 of a 2.5 mg tablet.    To take:  2.5 mg Take 1 of the 2.5 mg tablets.

## 2017-06-05 NOTE — PROGRESS NOTES
ANTICOAGULATION FOLLOW-UP CLINIC VISIT    Patient Name:  Elvia Amaro  Date:  6/5/2017  Contact Type:  Face to Face    SUBJECTIVE:     Patient Findings     Positives No Problem Findings           OBJECTIVE    INR Protime   Date Value Ref Range Status   06/05/2017 2.4 (A) 0.86 - 1.14 Final       ASSESSMENT / PLAN  INR assessment THER    Recheck INR In: 4 WEEKS    INR Location Clinic      Anticoagulation Summary as of 6/5/2017     INR goal 2.0-3.0   Today's INR 2.4   Maintenance plan 1.25 mg (2.5 mg x 0.5) on Tue, Sat; 2.5 mg (2.5 mg x 1) all other days   Full instructions 1.25 mg on Tue, Sat; 2.5 mg all other days   Weekly total 15 mg   No change documented Campos Carrera RN   Plan last modified Campos Carrera RN (3/6/2017)   Next INR check 7/7/2017   Target end date     Indications   Long-term (current) use of anticoagulants [Z79.01] [Z79.01]  Atrial fibrillation (H) [I48.91]         Anticoagulation Episode Summary     INR check location     Preferred lab     Send INR reminders to [facility]    Comments       Anticoagulation Care Providers     Provider Role Specialty Phone number    Tej Tilley MD [facility] [phone]            See the Encounter Report to view Anticoagulation Flowsheet and Dosing Calendar (Go to Encounters tab in chart review, and find the Anticoagulation Therapy Visit)    Dosage adjustment made based on physician directed care plan.    Campos Carrera, RN

## 2017-07-07 ENCOUNTER — ANTICOAGULATION THERAPY VISIT (OUTPATIENT)
Dept: ANTICOAGULATION | Facility: OTHER | Age: 77
End: 2017-07-07
Payer: COMMERCIAL

## 2017-07-07 DIAGNOSIS — I48.91 ATRIAL FIBRILLATION, UNSPECIFIED TYPE (H): ICD-10-CM

## 2017-07-07 DIAGNOSIS — Z79.01 LONG-TERM (CURRENT) USE OF ANTICOAGULANTS: ICD-10-CM

## 2017-07-07 LAB — INR POINT OF CARE: 2.1 (ref 0.86–1.14)

## 2017-07-07 PROCEDURE — 36416 COLLJ CAPILLARY BLOOD SPEC: CPT

## 2017-07-07 PROCEDURE — 99207 ZZC NO CHARGE NURSE ONLY: CPT

## 2017-07-07 PROCEDURE — 85610 PROTHROMBIN TIME: CPT | Mod: QW

## 2017-07-07 NOTE — MR AVS SNAPSHOT
Elvianoel Amaro   7/7/2017 9:15 AM   Anticoagulation Therapy Visit    Description:  77 year old female   Provider:  [facility]   Department:  [facility]           INR as of 7/7/2017     Today's INR 2.1      Anticoagulation Summary as of 7/7/2017     INR goal 2.0-3.0   Today's INR 2.1   Full instructions 1.25 mg on Tue, Sat; 2.5 mg all other days   Next INR check 8/4/2017    Indications   Long-term (current) use of anticoagulants [Z79.01] [Z79.01]  Atrial fibrillation (H) [I48.91]         Your next Anticoagulation Clinic appointment(s)     Aug 04, 2017  9:00 AM CDT   Anticoagulation [facility] with [facility] ANTI [facility]   [facility]    [address] [facility] [id number] [phone]              Contact Numbers     Clinic Number:         July 2017 Details    Sun Mon Tue Wed Thu Fri Sat           1                 2               3               4               5               6               7      2.5 mg   See details      8      1.25 mg           9      2.5 mg         10      2.5 mg         11      1.25 mg         12      2.5 mg         13      2.5 mg         14      2.5 mg         15      1.25 mg           16      2.5 mg         17      2.5 mg         18      1.25 mg         19      2.5 mg         20      2.5 mg         21      2.5 mg         22      1.25 mg           23      2.5 mg         24      2.5 mg         25      1.25 mg         26      2.5 mg         27      2.5 mg         28      2.5 mg         29      1.25 mg           30      2.5 mg         31      2.5 mg               Date Details   07/07 This INR check               How to take your warfarin dose     To take:  1.25 mg Take 0.5 of a 2.5 mg tablet.    To take:  2.5 mg Take 1 of the 2.5 mg tablets.           August 2017 Details    Sun Mon Tue Wed Thu Fri Sat       1      1.25 mg         2      2.5 mg         3      2.5 mg         4            5                 6               7               8               9                10               11               12                 13               14               15               16               17               18               19                 20               21               22               23               24               25               26                 27               28               29               30               31                  Date Details   No additional details    Date of next INR:  8/4/2017         How to take your warfarin dose     To take:  1.25 mg Take 0.5 of a 2.5 mg tablet.    To take:  2.5 mg Take 1 of the 2.5 mg tablets.

## 2017-07-07 NOTE — PROGRESS NOTES
ANTICOAGULATION FOLLOW-UP CLINIC VISIT    Patient Name:  Elvia Amaro  Date:  7/7/2017  Contact Type:  Face to Face    SUBJECTIVE:     Patient Findings     Positives No Problem Findings           OBJECTIVE    INR Protime   Date Value Ref Range Status   07/07/2017 2.1 (A) 0.86 - 1.14 Final       ASSESSMENT / PLAN  INR assessment THER    Recheck INR In: 4 WEEKS    INR Location Clinic      Anticoagulation Summary as of 7/7/2017     INR goal 2.0-3.0   Today's INR 2.1   Maintenance plan 1.25 mg (2.5 mg x 0.5) on Tue, Sat; 2.5 mg (2.5 mg x 1) all other days   Full instructions 1.25 mg on Tue, Sat; 2.5 mg all other days   Weekly total 15 mg   No change documented Campos Carrera RN   Plan last modified Campos Carrera RN (3/6/2017)   Next INR check 8/4/2017   Target end date     Indications   Long-term (current) use of anticoagulants [Z79.01] [Z79.01]  Atrial fibrillation (H) [I48.91]         Anticoagulation Episode Summary     INR check location     Preferred lab     Send INR reminders to [facility]    Comments       Anticoagulation Care Providers     Provider Role Specialty Phone number    Tej Tilley MD [facility] [phone]            See the Encounter Report to view Anticoagulation Flowsheet and Dosing Calendar (Go to Encounters tab in chart review, and find the Anticoagulation Therapy Visit)    Dosage adjustment made based on physician directed care plan.    Campos Carrera, RN

## 2017-07-12 DIAGNOSIS — I48.0 PAROXYSMAL ATRIAL FIBRILLATION (H): ICD-10-CM

## 2017-07-12 DIAGNOSIS — Z79.01 LONG TERM CURRENT USE OF ANTICOAGULANT THERAPY: ICD-10-CM

## 2017-07-12 RX ORDER — WARFARIN SODIUM 2.5 MG/1
TABLET ORAL
Qty: 80 TABLET | Refills: 0 | Status: SHIPPED | OUTPATIENT
Start: 2017-07-12 | End: 2017-09-18

## 2017-07-12 NOTE — TELEPHONE ENCOUNTER
coumadin    Last Written Prescription Date: 5/10/17  Last Fill Qty: 80, # refills: 0  Last Office Visit with G, [facility] or [facility] prescribing provider: 4/7/17  Next 5 appointments (look out 90 days)     Aug 10, 2017  8:40 AM CDT   Return Visit with OSCAR Mcknight CNP   [facility] ([facility]    [address] [place] [address] [phone]                   Date and Result of Last PT/INR:   Lab Results   Component Value Date    INR 2.1 07/07/2017    INR 2.4 06/05/2017    INR 1.18 12/02/2015    INR 1.18 03/21/2011

## 2017-08-04 ENCOUNTER — ANTICOAGULATION THERAPY VISIT (OUTPATIENT)
Dept: ANTICOAGULATION | Facility: OTHER | Age: 77
End: 2017-08-04
Payer: COMMERCIAL

## 2017-08-04 DIAGNOSIS — Z79.01 LONG-TERM (CURRENT) USE OF ANTICOAGULANTS: ICD-10-CM

## 2017-08-04 DIAGNOSIS — I48.91 ATRIAL FIBRILLATION, UNSPECIFIED TYPE (H): ICD-10-CM

## 2017-08-04 LAB — INR POINT OF CARE: 2.9 (ref 0.86–1.14)

## 2017-08-04 PROCEDURE — 85610 PROTHROMBIN TIME: CPT | Mod: QW

## 2017-08-04 PROCEDURE — 99207 ZZC NO CHARGE NURSE ONLY: CPT

## 2017-08-04 PROCEDURE — 36416 COLLJ CAPILLARY BLOOD SPEC: CPT

## 2017-08-04 NOTE — MR AVS SNAPSHOT
Elviapearl Amaro   8/4/2017 9:00 AM   Anticoagulation Therapy Visit    Description:  77 year old female   Provider:  STACIE ANTI COAG   Department:  [facility]           INR as of 8/4/2017     Today's INR 2.9      Anticoagulation Summary as of 8/4/2017     INR goal 2.0-3.0   Today's INR 2.9   Full instructions 1.25 mg on Tue, Sat; 2.5 mg all other days   Next INR check 9/8/2017    Indications   Long-term (current) use of anticoagulants [Z79.01] [Z79.01]  Atrial fibrillation (H) [I48.91]         Your next Anticoagulation Clinic appointment(s)     Sep 08, 2017  9:15 AM CDT   [facility] with STACIE ANTI PA[facility]   [facility]    [address] [facility] [address]   [phone]              Contact Numbers     Clinic Number:         August 2017 Details    Sun Mon Tue Wed Thu Fri Sat       1               2               3               4      2.5 mg   See details      5      1.25 mg           6      2.5 mg         7      2.5 mg         8      1.25 mg         9      2.5 mg         10      2.5 mg         11      2.5 mg         12      1.25 mg           13      2.5 mg         14      2.5 mg         15      1.25 mg         16      2.5 mg         17      2.5 mg         18      2.5 mg         19      1.25 mg           20      2.5 mg         21      2.5 mg         22      1.25 mg         23      2.5 mg         24      2.5 mg         25      2.5 mg         26      1.25 mg           27      2.5 mg         28      2.5 mg         29      1.25 mg         30      2.5 mg         31      2.5 mg            Date Details   08/04 This INR check               How to take your warfarin dose     To take:  1.25 mg Take 0.5 of a 2.5 mg tablet.    To take:  2.5 mg Take 1 of the 2.5 mg tablets.           September 2017 Details    Sun Mon Tue Wed Thu Fri Sat          1      2.5 mg         2      1.25 mg           3      2.5 mg         4      2.5 mg         5      1.25 mg         6      2.5 mg          7      2.5 mg         8            9                 10               11               12               13               14               15               16                 17               18               19               20               21               22               23                 24               25               26               27               28               29               30                Date Details   No additional details    Date of next INR:  9/8/2017         How to take your warfarin dose     To take:  1.25 mg Take 0.5 of a 2.5 mg tablet.    To take:  2.5 mg Take 1 of the 2.5 mg tablets.

## 2017-08-04 NOTE — PROGRESS NOTES
ANTICOAGULATION FOLLOW-UP CLINIC VISIT    Patient Name:  Elvia Amaro  Date:  8/4/2017  Contact Type:  Face to Face    SUBJECTIVE:     Patient Findings     Positives No Problem Findings           OBJECTIVE    INR Protime   Date Value Ref Range Status   08/04/2017 2.9 (A) 0.86 - 1.14 Final       ASSESSMENT / PLAN  INR assessment THER    Recheck INR In: 5 WEEKS    INR Location Clinic      Anticoagulation Summary as of 8/4/2017     INR goal 2.0-3.0   Today's INR 2.9   Maintenance plan 1.25 mg (2.5 mg x 0.5) on Tue, Sat; 2.5 mg (2.5 mg x 1) all other days   Full instructions 1.25 mg on Tue, Sat; 2.5 mg all other days   Weekly total 15 mg   No change documented Campos Carrera RN   Plan last modified Campos Carrera RN (3/6/2017)   Next INR check 9/8/2017   Target end date     Indications   Long-term (current) use of anticoagulants [Z79.01] [Z79.01]  Atrial fibrillation (H) [I48.91]         Anticoagulation Episode Summary     INR check location     Preferred lab     Send INR reminders to [facility]    Comments       Anticoagulation Care Providers     Provider Role Specialty Phone number    Tej Tilley MD [facility] [phone]            See the Encounter Report to view Anticoagulation Flowsheet and Dosing Calendar (Go to Encounters tab in chart review, and find the Anticoagulation Therapy Visit)    Dosage adjustment made based on physician directed care plan.    Campos Carrera, RN

## 2017-08-09 DIAGNOSIS — R60.0 LOCALIZED EDEMA: ICD-10-CM

## 2017-08-09 RX ORDER — FUROSEMIDE 20 MG
TABLET ORAL
Qty: 90 TABLET | OUTPATIENT
Start: 2017-08-09

## 2017-08-09 NOTE — TELEPHONE ENCOUNTER
furosemide (LASIX) 20 MG tablet 90 tablet 3 4/7/2017  No      Sig: TAKE 1 TABLET BY MOUTH EVER Y DAY     Patient has refills.  Heber Miller MA

## 2017-08-09 NOTE — TELEPHONE ENCOUNTER
Prescription was sent 4/7/17 for #90 with 3 refills.  Pharmacy notified via E-Prescribe refusal.     Brandie Deutsch RN  [facility]    Called [facility] pharmacy. She said patient just filled this RX yesterday for 3 months with them so she does not need a refill now. Pharmacy said they are unsure if patient is using them or [employer]. She said clinic can disregard refill and she will follow up with patient.     Brandie Deutsch RN  [facility]

## 2017-08-10 ENCOUNTER — RADIANT APPOINTMENT (OUTPATIENT)
Dept: GENERAL RADIOLOGY | Facility: CLINIC | Age: 77
End: 2017-08-10
Attending: INTERNAL MEDICINE
Payer: COMMERCIAL

## 2017-08-10 ENCOUNTER — OFFICE VISIT (OUTPATIENT)
Dept: CARDIOLOGY | Facility: CLINIC | Age: 77
End: 2017-08-10
Payer: COMMERCIAL

## 2017-08-10 ENCOUNTER — HOSPITAL ENCOUNTER (OUTPATIENT)
Dept: CARDIOLOGY | Facility: CLINIC | Age: 77
Discharge: HOME | End: 2017-08-10
Attending: INTERNAL MEDICINE | Admitting: INTERNAL MEDICINE
Payer: MEDICARE

## 2017-08-10 VITALS
WEIGHT: 226.5 LBS | BODY MASS INDEX: 36.4 KG/M2 | OXYGEN SATURATION: 95 % | DIASTOLIC BLOOD PRESSURE: 70 MMHG | HEART RATE: 56 BPM | SYSTOLIC BLOOD PRESSURE: 128 MMHG | HEIGHT: 66 IN

## 2017-08-10 DIAGNOSIS — Z79.899 ON AMIODARONE THERAPY: ICD-10-CM

## 2017-08-10 DIAGNOSIS — I42.8 OTHER CARDIOMYOPATHY (H): ICD-10-CM

## 2017-08-10 DIAGNOSIS — I48.0 PAROXYSMAL ATRIAL FIBRILLATION (H): ICD-10-CM

## 2017-08-10 DIAGNOSIS — I48.91 ATRIAL FIBRILLATION, UNSPECIFIED TYPE (H): ICD-10-CM

## 2017-08-10 LAB
ALBUMIN SERPL-MCNC: 3.3 G/DL (ref 3.4–5)
ALP SERPL-CCNC: 120 U/L (ref 40–150)
ALT SERPL W P-5'-P-CCNC: 24 U/L (ref 0–50)
ANION GAP SERPL CALCULATED.3IONS-SCNC: 6 MMOL/L (ref 3–14)
AST SERPL W P-5'-P-CCNC: 15 U/L (ref 0–45)
BILIRUB DIRECT SERPL-MCNC: 0.2 MG/DL (ref 0–0.2)
BILIRUB SERPL-MCNC: 0.5 MG/DL (ref 0.2–1.3)
BUN SERPL-MCNC: 18 MG/DL (ref 7–30)
CALCIUM SERPL-MCNC: 8.8 MG/DL (ref 8.5–10.1)
CHLORIDE SERPL-SCNC: 107 MMOL/L (ref 94–109)
CO2 SERPL-SCNC: 29 MMOL/L (ref 20–32)
CREAT SERPL-MCNC: 0.95 MG/DL (ref 0.52–1.04)
GFR SERPL CREATININE-BSD FRML MDRD: 56 ML/MIN/1.7M2
GLUCOSE SERPL-MCNC: 122 MG/DL (ref 70–99)
POTASSIUM SERPL-SCNC: 4.1 MMOL/L (ref 3.4–5.3)
PROT SERPL-MCNC: 7.1 G/DL (ref 6.8–8.8)
SODIUM SERPL-SCNC: 142 MMOL/L (ref 133–144)
TSH SERPL DL<=0.005 MIU/L-ACNC: 8.94 MU/L (ref 0.4–4)

## 2017-08-10 PROCEDURE — 71010 XR CHEST 1 VW: CPT | Mod: TC

## 2017-08-10 PROCEDURE — 36415 COLL VENOUS BLD VENIPUNCTURE: CPT | Performed by: INTERNAL MEDICINE

## 2017-08-10 PROCEDURE — 93000 ELECTROCARDIOGRAM COMPLETE: CPT | Performed by: NURSE PRACTITIONER

## 2017-08-10 PROCEDURE — 80048 BASIC METABOLIC PNL TOTAL CA: CPT | Performed by: INTERNAL MEDICINE

## 2017-08-10 PROCEDURE — 80076 HEPATIC FUNCTION PANEL: CPT | Performed by: INTERNAL MEDICINE

## 2017-08-10 PROCEDURE — 93005 ELECTROCARDIOGRAM TRACING: CPT | Performed by: REHABILITATION PRACTITIONER

## 2017-08-10 PROCEDURE — 84443 ASSAY THYROID STIM HORMONE: CPT | Performed by: INTERNAL MEDICINE

## 2017-08-10 PROCEDURE — 99214 OFFICE O/P EST MOD 30 MIN: CPT | Mod: 25 | Performed by: NURSE PRACTITIONER

## 2017-08-10 NOTE — PROGRESS NOTES
HISTORY OF PRESENT ILLNESS:  Ms. Amaro is a delightful 77-year-old woman that I am having the pleasure of meeting today at our [place] location.  She is an established patient of Dr. Kerr.      She has a history of atrial fibrillation with complaints of fatigue and shortness of breath.  On 12/02/2015, she was initiated on amiodarone and underwent a cardioversion that day.  She was initially placed on Xarelto, but later switched to warfarin due to the cost.  Since she has been back in normal rhythm, her shortness of breath and fatigue have improved significantly.      Other prevalent medical history includes breast cancer, hypertension and cardiomyopathy.  Her initial ejection fraction, while in atrial fibrillation, was 40%-45%.  In 02/2017, a repeat echo shows her normalized ejection fraction of 60%.  She had just a trace of valvular insufficiency and slight elevation of her left ventricular filling pressures.      Amiodarone lab work was completed today.  Her TSH continues to be elevated at 8.94.  She is being treated for hypothyroidism and is on levothyroxine at 100 mcg daily.  Her hepatic function and BMP were all normal.      Chest x-ray was also completed and was unremarkable.  It did not show any evidence of pulmonary fibrosis or acute processes.  Pulmonary function tests completed in 02/22/2017 was normal.      She currently denies chest pain, shortness of breath, lightheadedness or dizziness.  She does get some peripheral edema which usually resolved by the morning.        Chest x-ray today shows sinus antonio at 53 beats per minute with a first degree AV block with  milliseconds.  Right bundle branch block is noted.  When compared to her previous EKGs, this is stable.      All other review of systems and past medical history are noted below.      ASSESSMENT AND PLAN:  Ms. Stanton is a delightful 77-year-old woman here today for followup.   1.  Symptomatic paroxysmal atrial fibrillation.  She was  placed on amiodarone 2015.  She is on warfarin for anticoagulation.  Her INRs have been stable and are managed by Dr. Tilley.  Her PFTs and x-rays are unremarkable.  Her TSH still shows that she is slightly hypothyroid.  I have asked that she meet with Dr. Tilley to discuss whether or not her levothyroxine and should be increased.  She will have followup hepatic panel and TSH along with an EKG next year.  She has had 2 normal pulmonary function tests and has no complaints of shortness of breath.  I have moved her PFT testing to every other year.  A chest x-ray will also be completed next year.   2.  Hypertension.  Blood pressure is well controlled at 128/70.     3.  Cardiomyopathy.  Her ejection fraction has normalized.  She is on carvedilol at 12.5 mg b.i.d. and furosemide 20 mg daily.  She is euvolemic on exam.  Should she have complaints of symptomatic bradycardia or fatigue, I would recommend decreasing her morning dose of carvedilol to 6.25 mg.  She does not offer this concern today.      As always, we appreciate being involved in the care of this delightful patient.  We would be happy to see her in the interim if she has any questions or concerns.  Unfortunately, Dr. Kerr no longer comes to the [place] location.  She will follow up with Dr. Morton or myself in 1 year.  We would be happy to see her in the interim if there are questions or concerns.      Thank you for including us in her care.        cc:   [place] [address]         FRANCIS SHOOK NP             D: 08/10/2017 09:19   T: 08/10/2017 09:56   MT: CURT      Name:     CISCO SUMNER   MRN:      [id number]        Account:      [id number]   :      1940           Service Date: 08/10/2017      Document: [id number]

## 2017-08-10 NOTE — PATIENT INSTRUCTIONS
Component      Latest Ref Rng & Units 8/10/2017   Sodium      133 - 144 mmol/L 142   Potassium      3.4 - 5.3 mmol/L 4.1   Chloride      94 - 109 mmol/L 107   Carbon Dioxide      20 - 32 mmol/L 29   Anion Gap      3 - 14 mmol/L 6   Glucose      70 - 99 mg/dL 122 (H)   Urea Nitrogen      7 - 30 mg/dL 18   Creatinine      0.52 - 1.04 mg/dL 0.95   GFR Estimate      >60 mL/min/1.7m2 56 (L)   GFR Estimate If Black      >60 mL/min/1.7m2 68   Calcium      8.5 - 10.1 mg/dL 8.8   Bilirubin Direct      0.0 - 0.2 mg/dL 0.2   Bilirubin Total      0.2 - 1.3 mg/dL 0.5   Albumin      3.4 - 5.0 g/dL 3.3 (L)   Protein Total      6.8 - 8.8 g/dL 7.1   Alkaline Phosphatase      40 - 150 U/L 120   ALT      0 - 50 U/L 24   AST      0 - 45 U/L 15   TSH      0.40 - 4.00 mU/L 8.94 (H)     Discuss increasing levothyroxine with 's  Chest x-ray normal  Let your eye doctor know that you are on amiodarone  Call with any questions or concerns

## 2017-08-10 NOTE — MR AVS SNAPSHOT
After Visit Summary   8/10/2017    Elvia Amaro    MRN: [id number]           Patient Information     Date Of Birth          1940        Visit Information        Provider Department      8/10/2017 8:40 AM Rachana Dey APRN CNP [facility]        Today's Diagnoses     Atrial fibrillation, unspecified type (H)        Cardiomyopathy (H)        On amiodarone therapy          Care Instructions    Component      Latest Ref Rng & Units 8/10/2017   Sodium      133 - 144 mmol/L 142   Potassium      3.4 - 5.3 mmol/L 4.1   Chloride      94 - 109 mmol/L 107   Carbon Dioxide      20 - 32 mmol/L 29   Anion Gap      3 - 14 mmol/L 6   Glucose      70 - 99 mg/dL 122 (H)   Urea Nitrogen      7 - 30 mg/dL 18   Creatinine      0.52 - 1.04 mg/dL 0.95   GFR Estimate      >60 mL/min/1.7m2 56 (L)   GFR Estimate If Black      >60 mL/min/1.7m2 68   Calcium      8.5 - 10.1 mg/dL 8.8   Bilirubin Direct      0.0 - 0.2 mg/dL 0.2   Bilirubin Total      0.2 - 1.3 mg/dL 0.5   Albumin      3.4 - 5.0 g/dL 3.3 (L)   Protein Total      6.8 - 8.8 g/dL 7.1   Alkaline Phosphatase      40 - 150 U/L 120   ALT      0 - 50 U/L 24   AST      0 - 45 U/L 15   TSH      0.40 - 4.00 mU/L 8.94 (H)     Discuss increasing levothyroxine with 's  Chest x-ray normal  Let your eye doctor know that you are on amiodarone  Call with any questions or concerns          Follow-ups after your visit        Additional Services     Follow-Up with Electrophysiologist                 Your next 10 appointments already scheduled     Sep 08, 2017  9:15 AM CDT   [facility] with  ANTI COAG   [facility]    [address] [facility] [id number] [phone]            Oct 05, 2017  1:00 PM CDT   [facility] Lab Draw with  [facility] DRAW   [facility] Lab Draw ([facility] [place])    [address] [facility]  2nd Floor  [facility] [address] [phone]             "Oct 05, 2017  1:30 PM CDT   (Arrive by 1:15 PM)   MA DIAGNOSTIC DIGITAL BILATERAL with [id number]   [facility] ([facility])    [address] [facility]  [address] [facility] [id number] [phone]           Do not use any powder, lotion or deodorant under your arms or on your breast. If you do, we will ask you to remove it before your exam.  Wear comfortable, two-piece clothing.  If you have any allergies, tell your care team.  Bring any previous mammograms from other facilities or have them mailed to the breast center.  Three-dimensional (3D) mammograms are available at [place] locations in [facility], and [place]. [facility] locations include [place] and [facility] in [place]. Benefits of 3D mammograms include: - Improved rate of cancer detection - Decreases your chance of having to go back for more tests, which means fewer: - \"False-positive\" results (This means that there is an abnormal area but it isn't cancer.) - Invasive testing procedures, such as a biopsy or surgery - Can provide clearer images of the breast if you have dense breast tissue. 3D mammography is an optional exam that anyone can have with a 2D mammogram. It doesn't replace or take the place of a 2D mammogram. 2D mammograms remain an effective screening test for all women.  Not all insurance companies cover the cost of a 3D mammogram. Check with your insurance.            Oct 05, 2017  2:00 PM CDT   (Arrive by 1:45 PM)   Return Visit with Megan Rodríguez PA-C   [facility] ([facility])    [address] [address] [phone]            Apr 05, 2018 12:00 PM CDT   [facility] Lab Draw with  [email or web address] LAB DRAW   [facility] Lab Draw ([facility])    [address] [address]   [phone]            " "Apr 05, 2018 12:30 PM CDT   (Arrive by 12:15 PM)   Return Visit with Elijah Padilla MD   [facility] ([facility] [place])    [address] [address] [phone]              Future tests that were ordered for you today     Open Future Orders        Priority Expected Expires Ordered    General PFT Lab (Please always keep checked) Routine  5/10/2019 8/10/2017    EKG 12-lead complete w/read - Clinics (to be scheduled) Routine 8/10/2018 8/11/2018 8/10/2017    Follow-Up with Electrophysiologist Routine 8/10/2018 8/11/2018 8/10/2017            Who to contact     If you have questions or need follow up information about today's clinic visit or your schedule please contact [facility] directly at [phone].  Normal or non-critical lab and imaging results will be communicated to you by MyChart, letter or phone within 4 business days after the clinic has received the results. If you do not hear from us within 7 days, please contact the clinic through MyChart or phone. If you have a critical or abnormal lab result, we will notify you by phone as soon as possible.  Submit refill requests through [employer] or call your pharmacy and they will forward the refill request to us. Please allow 3 business days for your refill to be completed.          Additional Information About Your Visit        [email or web address] Information     [employer] lets you send messages to your doctor, view your test results, renew your prescriptions, schedule appointments and more. To sign up, go to [email or web address] . Click on \"Log in\" on the left side of the screen, which will take you to the Welcome page. Then click on \"Sign up Now\" on the right side of the page.     You will be asked to enter the access code listed below, as well as some personal information. Please follow the directions to create your username and password.     Your access code is: " "[email or web address]  Expires: 2017  9:04 AM     Your access code will  in 90 days. If you need help or a new code, please call your [place] clinic or [phone].        Care EveryWhere ID     This is your Care EveryWhere ID. This could be used by other organizations to access your [place] medical records  [id number]        Your Vitals Were     Pulse Height Pulse Oximetry BMI (Body Mass Index)          56 1.664 m (5' 5.5\") 95% 37.12 kg/m2         Blood Pressure from Last 3 Encounters:   08/10/17 128/70   17 132/78   17 156/80    Weight from Last 3 Encounters:   08/10/17 102.7 kg (226 lb 8 oz)   17 103 kg (227 lb)   17 103.3 kg (227 lb 11.2 oz)              We Performed the Following     Basic metabolic panel     Comprehensive metabolic panel     EKG 12-lead complete w/read - Clinics     Follow-Up with Cardiac Advanced Practice Provider     Hepatic panel     TSH     TSH        Primary Care Provider Office Phone # Fax #    Tej Tilley [phone]       [address] [facility] [address]        Equal Access to Services     SEGUN VALLES AH: Hadii aad ku hadasho Soomaali, waaxda luqadaha, qaybta kaalmada adeegyada, enoch stout. So [facility] [phone].    ATENCIÓN: Si habla español, tiene a good disposición servicios gratuitos de asistencia lingüística. Llame al [phone].    We comply with applicable federal civil rights laws and [place] laws. We do not discriminate on the basis of race, color, national origin, age, disability sex, sexual orientation or gender identity.            Thank you!     Thank you for choosing [facility]  for your care. Our goal is always to provide you with excellent care. Hearing back from our patients is one way we can continue to improve our services. Please take a few minutes to complete the written survey that you may receive in the mail after your visit with us. Thank you!             Your Updated " Medication List - Protect others around you: Learn how to safely use, store and throw away your medicines at www.disposemymeds.org.          This list is accurate as of: 8/10/17  9:04 AM.  Always use your most recent med list.                   Brand Name Dispense Instructions for use Diagnosis    ACE/ARB NOT PRESCRIBED (INTENTIONAL)      1 each every 15 minutes ACE & ARB not prescribed due to Symptomatic hypotension not due to excessive diuresis    Atrial fibrillation, unspecified       amiodarone 200 MG tablet    PACERONE/CODARONE    90 tablet    TAKE 1 TABLET (200 MG) BY MOUTH DAILY    Atrial fibrillation, unspecified type (H)       CALCIUM + D PO      Take  by mouth.        carvedilol 12.5 MG tablet    COREG    180 tablet    TAKE 1 TABLET BY MOUTH TWIC E A DAY WITH MEALS    Cardiomyopathy (H)       furosemide 20 MG tablet    LASIX    90 tablet    TAKE 1 TABLET BY MOUTH EVER Y DAY    Localized edema, Congenital hypothyroidism without goiter, Acute systolic heart failure (H), Paroxysmal atrial fibrillation (H)       levothyroxine 100 MCG tablet    SYNTHROID/LEVOTHROID    30 tablet    Take 1 tablet (100 mcg) by mouth daily    Congenital hypothyroidism without goiter, Localized edema, Acute systolic heart failure (H), Paroxysmal atrial fibrillation (H)       meloxicam 7.5 MG tablet    MOBIC    90 tablet    TAKE 1 TABLET BY MOUTH EVER Y DAY    Degeneration of lumbar or lumbosacral intervertebral disc       * order for DME     1 Device    Equipment being ordered: Elastic Sleeve  Prosthetics Fax [phone]    Malignant neoplasm of left female breast, unspecified site of breast       * order for DME     1 Device    Equipment being ordered: breast prosthesis and bras    S/P left mastectomy, Malignant neoplasm of left female breast, unspecified site of breast       VITAMIN D-1000 MAX ST PO      Take  by mouth.        warfarin 2.5 MG tablet    COUMADIN    80 tablet    TAKE 1/2 TABLET (1.25 MG) TUESDAY, SATURDAY AND  1 TABLET ALL OTHER DAYS OR AS DIRECTED BY THE COUMADIN CLINIC.    Paroxysmal atrial fibrillation (H), Long term current use of anticoagulant therapy       * Notice:  This list has 2 medication(s) that are the same as other medications prescribed for you. Read the directions carefully, and ask your doctor or other care provider to review them with you.

## 2017-08-10 NOTE — LETTER
8/10/2017    Tej Tilley MD  [address] [facility] [address]      RE: Elvia Larsenen       Dear Colleague,    I had the pleasure of seeing Elvia Amaro in the [facility].    Ms. Amaro is a delightful 77-year-old woman that I am having the pleasure of meeting today at our [place] location.  She is an established patient of Dr. Kerr.      She has a history of atrial fibrillation with complaints of fatigue and shortness of breath.  On 12/02/2015, she was initiated on amiodarone and underwent a cardioversion that day.  She was initially placed on Xarelto, but later switched to warfarin due to the cost.  Since she has been back in normal rhythm, her shortness of breath and fatigue have improved significantly.      Other prevalent medical history includes breast cancer, hypertension and cardiomyopathy.  Her initial ejection fraction, while in atrial fibrillation, was 40%-45%.  In 02/2017, a repeat echo shows her normalized ejection fraction of 60%.  She had just a trace of valvular insufficiency and slight elevation of her left ventricular filling pressures.      Amiodarone lab work was completed today.  Her TSH continues to be elevated at 8.94.  She is being treated for hypothyroidism and is on levothyroxine at 100 mcg daily.  Her hepatic function and BMP were all normal.      Chest x-ray was also completed and was unremarkable.  It did not show any evidence of pulmonary fibrosis or acute processes.  Pulmonary function tests completed in 02/22/2017 was normal.      She currently denies chest pain, shortness of breath, lightheadedness or dizziness.  She does get some peripheral edema which usually resolved by the morning.        Chest x-ray today shows sinus antonio at 53 beats per minute with a first degree AV block with  milliseconds.  Right bundle branch block is noted.  When compared to her previous EKGs, this is stable.      All other review of systems and past  medical history are noted below.      Outpatient Encounter Prescriptions as of 8/10/2017   Medication Sig Dispense Refill     warfarin (COUMADIN) 2.5 MG tablet TAKE 1/2 TABLET (1.25 MG) TUESDAY, SATURDAY AND 1 TABLET ALL OTHER DAYS OR AS DIRECTED BY THE COUMADIN CLINIC. 80 tablet 0     levothyroxine (SYNTHROID/LEVOTHROID) 100 MCG tablet Take 1 tablet (100 mcg) by mouth daily 30 tablet 0     furosemide (LASIX) 20 MG tablet TAKE 1 TABLET BY MOUTH EVER Y DAY 90 tablet 3     meloxicam (MOBIC) 7.5 MG tablet TAKE 1 TABLET BY MOUTH EVER Y DAY 90 tablet 3     amiodarone (PACERONE/CODARONE) 200 MG tablet TAKE 1 TABLET (200 MG) BY MOUTH DAILY 90 tablet 3     carvedilol (COREG) 12.5 MG tablet TAKE 1 TABLET BY MOUTH TWIC E A DAY WITH MEALS 180 tablet 3     order for DME Equipment being ordered: breast prosthesis and bras 1 Device 0     order for DME Equipment being ordered: Elastic Sleeve   Prosthetics Fax [phone] 1 Device 1     ACE/ARB NOT PRESCRIBED, INTENTIONAL, 1 each every 15 minutes ACE & ARB not prescribed due to Symptomatic hypotension not due to excessive diuresis       Calcium Carbonate-Vitamin D (CALCIUM + D PO) Take  by mouth.       Cholecalciferol (VITAMIN D-1000 MAX ST PO) Take  by mouth.       No facility-administered encounter medications on file as of 8/10/2017.      ASSESSMENT AND PLAN:  Ms. Stanton is a delightful 77-year-old woman here today for followup.   1.  Symptomatic paroxysmal atrial fibrillation.  She was placed on amiodarone 12/2015.  She is on warfarin for anticoagulation.  Her INRs have been stable and are managed by Dr. Tilley.  Her PFTs and x-rays are unremarkable.  Her TSH still shows that she is slightly hypothyroid.  I have asked that she meet with Dr. Tilley to discuss whether or not her levothyroxine and should be increased.  She will have followup hepatic panel and TSH along with an EKG next year.  She has had 2 normal pulmonary function tests and has no complaints of  shortness of breath.  I have moved her PFT testing to every other year.  A chest x-ray will also be completed next year.   2.  Hypertension.  Blood pressure is well controlled at 128/70.     3.  Cardiomyopathy.  Her ejection fraction has normalized.  She is on carvedilol at 12.5 mg b.i.d. and furosemide 20 mg daily.  She is euvolemic on exam.  Should she have complaints of symptomatic bradycardia or fatigue, I would recommend decreasing her morning dose of carvedilol to 6.25 mg.  She does not offer this concern today.      As always, we appreciate being involved in the care of this delightful patient.  We would be happy to see her in the interim if she has any questions or concerns.  Unfortunately, Dr. Kerr no longer comes to the [place] location.  She will follow up with Dr. Morton or myself in 1 year.  We would be happy to see her in the interim if there are questions or concerns.      Thank you for including us in her care.       Sincerely,    Rachana Dey, NASRIN, APRN CNP     [facility]

## 2017-08-10 NOTE — LETTER
8/10/2017      RE: Elvia Amaro  [address] [facility] [address]       Dear Colleague,    Thank you for the opportunity to participate in the care of your patient, Elvia Amaro, at the [facility]. Please see a copy of my visit note below.    HPI and Plan: #[id number]  See dictation    Orders Placed This Encounter   Procedures     Comprehensive metabolic panel     TSH     Follow-Up with Electrophysiologist     EKG 12-lead complete w/read - Clinics     EKG 12-lead complete w/read - Clinics (to be scheduled)     General PFT Lab (Please always keep checked)       No orders of the defined types were placed in this encounter.      There are no discontinued medications.      Encounter Diagnoses   Name Primary?     Atrial fibrillation, unspecified type (H)      Cardiomyopathy (H)      On amiodarone therapy        CURRENT MEDICATIONS:  Current Outpatient Prescriptions   Medication Sig Dispense Refill     warfarin (COUMADIN) 2.5 MG tablet TAKE 1/2 TABLET (1.25 MG) TUESDAY, SATURDAY AND 1 TABLET ALL OTHER DAYS OR AS DIRECTED BY THE COUMADIN CLINIC. 80 tablet 0     levothyroxine (SYNTHROID/LEVOTHROID) 100 MCG tablet Take 1 tablet (100 mcg) by mouth daily 30 tablet 0     furosemide (LASIX) 20 MG tablet TAKE 1 TABLET BY MOUTH EVER Y DAY 90 tablet 3     meloxicam (MOBIC) 7.5 MG tablet TAKE 1 TABLET BY MOUTH EVER Y DAY 90 tablet 3     amiodarone (PACERONE/CODARONE) 200 MG tablet TAKE 1 TABLET (200 MG) BY MOUTH DAILY 90 tablet 3     carvedilol (COREG) 12.5 MG tablet TAKE 1 TABLET BY MOUTH TWIC E A DAY WITH MEALS 180 tablet 3     order for DME Equipment being ordered: breast prosthesis and bras 1 Device 0     order for DME Equipment being ordered: Elastic Sleeve   Prosthetics Fax [phone] 1 Device 1     ACE/ARB NOT PRESCRIBED, INTENTIONAL, 1 each every 15 minutes ACE & ARB not prescribed due to Symptomatic hypotension not due to excessive diuresis        Calcium Carbonate-Vitamin D (CALCIUM + D PO) Take  by mouth.       Cholecalciferol (VITAMIN D-1000 MAX ST PO) Take  by mouth.         ALLERGIES     Allergies   Allergen Reactions     Bacitracin      Skin Irritation     Cephalexin Hcl Itching     Clindamycin Hcl Itching       PAST MEDICAL HISTORY:  Past Medical History:   Diagnosis Date     Basal cell carcinoma      Hypertension goal BP (blood pressure) < 140/90 3/14/2011     Malignant neoplasm (H)     breast     NONSPECIFIC MEDICAL HISTORY     no active problems       PAST SURGICAL HISTORY:  Past Surgical History:   Procedure Laterality Date     ANESTHESIA CARDIOVERSION N/A 9/18/2015    Procedure: ANESTHESIA CARDIOVERSION;  Surgeon: GENERIC ANESTHESIA PROVIDER;  Location: [facility] OR     BIOPSY SKIN (LOCATION)      left leg     BREAST SURGERY       COLONOSCOPY  8/29/2011    Procedure:COLONOSCOPY; colonoscopy; Surgeon:VICKIE DUFF; Location:[facility]     HC HYSTEROSCOPY W ENDOMETRIAL BX/POLYPECTOMY W/WO D&C  9/30/2005    Hysteroscopy. D&C. Cervical biopsies.     HC REMOVAL OF TONSILS,<13 Y/O      Tonsils <10y.o.     LAPAROSCOPIC HYSTERECTOMY TOTAL, BILATERAL SALPINGO-OOPHORECTOMY, COMBINED  3/21/11     MASTECTOMY  3/21/11    left mastectomy, lymph node dissection     MOHS MICROGRAPHIC PROCEDURE       NO HISTORY OF SURGERY       REMOVE CATHETER VASCULAR ACCESS  5/4/2011    Procedure:REMOVE CATHETER VASCULAR ACCESS; right subclavian; Surgeon:ESTER ROPER; Location:[facility] OR     VASCULAR SURGERY      port       FAMILY HISTORY:  Family History   Problem Relation Age of Onset     DIABETES Father      CEREBROVASCULAR DISEASE Father      HEART DISEASE Mother      MI age 68     CANCER No family hx of      no skin cancer       SOCIAL HISTORY:  Social History     Social History     Marital status:      Spouse name: N/A     Number of children: N/A     Years of education: N/A     Social History Main Topics     Smoking status: Never Smoker     Smokeless tobacco: Never Used      "Alcohol use No     Drug use: No     Sexual activity: Yes     Partners: Male     Other Topics Concern     Parent/Sibling W/ Cabg, Mi Or Angioplasty Before 65f 55m? No     Social History Narrative       Review of Systems:  Skin:  Negative       Eyes:  Positive for cataracts    ENT:  Negative      Respiratory:  Negative       Cardiovascular:  Negative for;palpitations;chest pain;dizziness;lightheadedness edema;Positive for    Gastroenterology: Negative      Genitourinary:  Negative      Musculoskeletal:  Negative      Neurologic:  Negative      Psychiatric:  Negative      Heme/Lymph/Imm:  Positive for allergies    Endocrine:  Positive for thyroid disorder      Physical Exam:  Vitals: /70 (BP Location: Right arm, Patient Position: Fowlers, Cuff Size: Adult Large)  Pulse 56  Ht 1.664 m (5' 5.5\")  Wt 102.7 kg (226 lb 8 oz)  SpO2 95%  BMI 37.12 kg/m2    Constitutional:  cooperative, alert and oriented, well developed, well nourished, in no acute distress        Skin:  warm and dry to the touch, no apparent skin lesions or masses noted        Head:  normocephalic, no masses or lesions        Eyes:  pupils equal and round        Neck:  carotid pulses are full and equal bilaterally, JVP normal, no carotid bruit, no thyromegaly        Chest:  clear to auscultation          Cardiac: regular rhythm;normal S1 and S2;no S3 or S4;no murmurs, gallops or rubs detected                  Abdomen:  abdomen soft;BS normoactive        Vascular: not assessed this visit                                        Extremities and Back:  no deformities, clubbing, cyanosis, erythema observed;no edema              Neurological:  affect appropriate, oriented to time, person and place;no gross motor deficits              CC  Nabil Kerr MD  [address] [facility] [address]  [place] [address]                HISTORY OF PRESENT ILLNESS:  Ms. Amaro is a delightful 77-year-old woman that I am having the pleasure of meeting today at our " [facility].  She is an established patient of Dr. Kerr.      She has a history of atrial fibrillation with complaints of fatigue and shortness of breath.  On 12/02/2015, she was initiated on amiodarone and underwent a cardioversion that day.  She was initially placed on Xarelto, but later switched to warfarin due to the cost.  Since she has been back in normal rhythm, her shortness of breath and fatigue have improved significantly.      Other prevalent medical history includes breast cancer, hypertension and cardiomyopathy.  Her initial ejection fraction, while in atrial fibrillation, was 40%-45%.  In 02/2017, a repeat echo shows her normalized ejection fraction of 60%.  She had just a trace of valvular insufficiency and slight elevation of her left ventricular filling pressures.      Amiodarone lab work was completed today.  Her TSH continues to be elevated at 8.94.  She is being treated for hypothyroidism and is on levothyroxine at 100 mcg daily.  Her hepatic function and BMP were all normal.      Chest x-ray was also completed and was unremarkable.  It did not show any evidence of pulmonary fibrosis or acute processes.  Pulmonary function tests completed in 02/22/2017 was normal.      She currently denies chest pain, shortness of breath, lightheadedness or dizziness.  She does get some peripheral edema which usually resolved by the morning.        Chest x-ray today shows sinus antonio at 53 beats per minute with a first degree AV block with  milliseconds.  Right bundle branch block is noted.  When compared to her previous EKGs, this is stable.      All other review of systems and past medical history are noted below.      ASSESSMENT AND PLAN:  Ms. Stanton is a delightful 77-year-old woman here today for followup.   1.  Symptomatic paroxysmal atrial fibrillation.  She was placed on amiodarone 12/2015.  She is on warfarin for anticoagulation.  Her INRs have been stable and are managed by   Treva.  Her PFTs and x-rays are unremarkable.  Her TSH still shows that she is slightly hypothyroid.  I have asked that she meet with Dr. Tilley to discuss whether or not her levothyroxine and should be increased.  She will have followup hepatic panel and TSH along with an EKG next year.  She has had 2 normal pulmonary function tests and has no complaints of shortness of breath.  I have moved her PFT testing to every other year.  A chest x-ray will also be completed next year.   2.  Hypertension.  Blood pressure is well controlled at 128/70.     3.  Cardiomyopathy.  Her ejection fraction has normalized.  She is on carvedilol at 12.5 mg b.i.d. and furosemide 20 mg daily.  She is euvolemic on exam.  Should she have complaints of symptomatic bradycardia or fatigue, I would recommend decreasing her morning dose of carvedilol to 6.25 mg.  She does not offer this concern today.      As always, we appreciate being involved in the care of this delightful patient.  We would be happy to see her in the interim if she has any questions or concerns.  Unfortunately, Dr. Kerr no longer comes to the [place] location.  She will follow up with Dr. Morton or myself in 1 year.  We would be happy to see her in the interim if there are questions or concerns.      Thank you for including us in her care.        cc:   Tej Tilley MD    [address] [address]         FRANCIS SHOOK NP

## 2017-09-15 ENCOUNTER — ANTICOAGULATION THERAPY VISIT (OUTPATIENT)
Dept: ANTICOAGULATION | Facility: OTHER | Age: 77
End: 2017-09-15
Payer: COMMERCIAL

## 2017-09-15 DIAGNOSIS — Z79.01 LONG-TERM (CURRENT) USE OF ANTICOAGULANTS: ICD-10-CM

## 2017-09-15 DIAGNOSIS — I48.91 ATRIAL FIBRILLATION, UNSPECIFIED TYPE (H): ICD-10-CM

## 2017-09-15 LAB — INR POINT OF CARE: 2.5 (ref 0.86–1.14)

## 2017-09-15 PROCEDURE — 85610 PROTHROMBIN TIME: CPT | Mod: QW

## 2017-09-15 PROCEDURE — 36416 COLLJ CAPILLARY BLOOD SPEC: CPT

## 2017-09-15 PROCEDURE — 99207 ZZC NO CHARGE NURSE ONLY: CPT

## 2017-09-15 NOTE — PROGRESS NOTES
ANTICOAGULATION FOLLOW-UP CLINIC VISIT    Patient Name:  Elvia Amaro  Date:  9/15/2017  Contact Type:  Face to Face    SUBJECTIVE:     Patient Findings     Positives No Problem Findings           OBJECTIVE    INR Protime   Date Value Ref Range Status   09/15/2017 2.5 (A) 0.86 - 1.14 Final       ASSESSMENT / PLAN  INR assessment THER    Recheck INR In: 5 WEEKS    INR Location Clinic      Anticoagulation Summary as of 9/15/2017     INR goal 2.0-3.0   Today's INR 2.5   Maintenance plan 1.25 mg (2.5 mg x 0.5) on Tue, Sat; 2.5 mg (2.5 mg x 1) all other days   Full instructions 1.25 mg on Tue, Sat; 2.5 mg all other days   Weekly total 15 mg   No change documented Campos Carrera RN   Plan last modified Campos Carrera RN (3/6/2017)   Next INR check 10/20/2017   Target end date     Indications   Long-term (current) use of anticoagulants [Z79.01] [Z79.01]  Atrial fibrillation (H) [I48.91]         Anticoagulation Episode Summary     INR check location     Preferred lab     Send INR reminders to [facility]    Comments       Anticoagulation Care Providers     Provider Role Specialty Phone number    Tej Tilley MD [facility] [phone]            See the Encounter Report to view Anticoagulation Flowsheet and Dosing Calendar (Go to Encounters tab in chart review, and find the Anticoagulation Therapy Visit)    Dosage adjustment made based on physician directed care plan.    Campos Carrera, RN

## 2017-09-15 NOTE — MR AVS SNAPSHOT
Elvianoel Amaro   9/15/2017 11:00 AM   Anticoagulation Therapy Visit    Description:  77 year old female   Provider:  [facility]   Department:  [facility]           INR as of 9/15/2017     Today's INR 2.5      Anticoagulation Summary as of 9/15/2017     INR goal 2.0-3.0   Today's INR 2.5   Full instructions 1.25 mg on Tue, Sat; 2.5 mg all other days   Next INR check 10/20/2017    Indications   Long-term (current) use of anticoagulants [Z79.01] [Z79.01]  Atrial fibrillation (H) [I48.91]         Your next Anticoagulation Clinic appointment(s)     Sep 15, 2017 11:00 AM CDT   [facility]    [address] [facility] [address]   [phone]            Oct 20, 2017  9:15 AM CDT   Anticoagulation Visit with  ANTI COAG   [facility]    [address] [facility] [address]   [phone]              Contact Numbers     Clinic Number:         September 2017 Details    Sun Mon Tue Wed Thu Fri Sat          1               2                 3               4               5               6               7               8               9                 10               11               12               13               14               15      2.5 mg   See details      16      1.25 mg           17      2.5 mg         18      2.5 mg         19      1.25 mg         20      2.5 mg         21      2.5 mg         22      2.5 mg         23      1.25 mg           24      2.5 mg         25      2.5 mg         26      1.25 mg         27      2.5 mg         28      2.5 mg         29      2.5 mg         30      1.25 mg          Date Details   09/15 This INR check               How to take your warfarin dose     To take:  1.25 mg Take 0.5 of a 2.5 mg tablet.    To take:  2.5 mg Take 1 of the 2.5 mg tablets.           October 2017 Details    Sun Mon Tue Wed Thu Fri Sat     1      2.5 mg         2      2.5 mg          3      1.25 mg         4      2.5 mg         5      2.5 mg         6      2.5 mg         7      1.25 mg           8      2.5 mg         9      2.5 mg         10      1.25 mg         11      2.5 mg         12      2.5 mg         13      2.5 mg         14      1.25 mg           15      2.5 mg         16      2.5 mg         17      1.25 mg         18      2.5 mg         19      2.5 mg         20            21                 22               23               24               25               26               27               28                 29               30               31                    Date Details   No additional details    Date of next INR:  10/20/2017         How to take your warfarin dose     To take:  1.25 mg Take 0.5 of a 2.5 mg tablet.    To take:  2.5 mg Take 1 of the 2.5 mg tablets.

## 2017-09-18 DIAGNOSIS — I48.0 PAROXYSMAL ATRIAL FIBRILLATION (H): ICD-10-CM

## 2017-09-18 DIAGNOSIS — Z79.01 LONG TERM CURRENT USE OF ANTICOAGULANT THERAPY: ICD-10-CM

## 2017-09-19 RX ORDER — WARFARIN SODIUM 2.5 MG/1
TABLET ORAL
Qty: 80 TABLET | Refills: 1 | Status: SHIPPED | OUTPATIENT
Start: 2017-09-19 | End: 2018-02-05

## 2017-09-19 NOTE — TELEPHONE ENCOUNTER
Coumadin    Last Written Prescription Date: 7/12/17  Last Fill Qty: 80, # refills: 0  Last Office Visit with [facility], [facility] or [facility] prescribing provider: 4/7/17  Next 5 appointments (look out 90 days)     Oct 13, 2017  9:00 AM CDT   Office Visit with Tej Tilley MD   [facility]    [address] [facility] [id number] [phone]                   Date and Result of Last PT/INR:   Lab Results   Component Value Date    INR 2.5 09/15/2017    INR 2.9 08/04/2017    INR 1.18 12/02/2015    INR 1.18 03/21/2011

## 2017-10-05 ENCOUNTER — APPOINTMENT (OUTPATIENT)
Dept: LAB | Facility: CLINIC | Age: 77
End: 2017-10-05
Attending: INTERNAL MEDICINE
Payer: MEDICARE

## 2017-10-05 ENCOUNTER — ONCOLOGY VISIT (OUTPATIENT)
Dept: ONCOLOGY | Facility: CLINIC | Age: 77
End: 2017-10-05
Attending: PHYSICIAN ASSISTANT
Payer: MEDICARE

## 2017-10-05 VITALS
HEART RATE: 61 BPM | OXYGEN SATURATION: 96 % | SYSTOLIC BLOOD PRESSURE: 129 MMHG | DIASTOLIC BLOOD PRESSURE: 72 MMHG | TEMPERATURE: 97.3 F | BODY MASS INDEX: 35.92 KG/M2 | RESPIRATION RATE: 18 BRPM | WEIGHT: 223.5 LBS | HEIGHT: 66 IN

## 2017-10-05 DIAGNOSIS — C50.412 BREAST CANCER OF UPPER-OUTER QUADRANT OF LEFT FEMALE BREAST (H): ICD-10-CM

## 2017-10-05 LAB
ALBUMIN SERPL-MCNC: 3.1 G/DL (ref 3.4–5)
ALP SERPL-CCNC: 139 U/L (ref 40–150)
ALT SERPL W P-5'-P-CCNC: 28 U/L (ref 0–50)
ANION GAP SERPL CALCULATED.3IONS-SCNC: 6 MMOL/L (ref 3–14)
AST SERPL W P-5'-P-CCNC: 24 U/L (ref 0–45)
BASOPHILS # BLD AUTO: 0.1 10E9/L (ref 0–0.2)
BASOPHILS NFR BLD AUTO: 1 %
BILIRUB SERPL-MCNC: 0.8 MG/DL (ref 0.2–1.3)
BUN SERPL-MCNC: 20 MG/DL (ref 7–30)
CALCIUM SERPL-MCNC: 8.9 MG/DL (ref 8.5–10.1)
CHLORIDE SERPL-SCNC: 107 MMOL/L (ref 94–109)
CO2 SERPL-SCNC: 22 MMOL/L (ref 20–32)
CREAT SERPL-MCNC: 0.88 MG/DL (ref 0.52–1.04)
DIFFERENTIAL METHOD BLD: ABNORMAL
EOSINOPHIL # BLD AUTO: 0.2 10E9/L (ref 0–0.7)
EOSINOPHIL NFR BLD AUTO: 3 %
ERYTHROCYTE [DISTWIDTH] IN BLOOD BY AUTOMATED COUNT: 14.1 % (ref 10–15)
GFR SERPL CREATININE-BSD FRML MDRD: 62 ML/MIN/1.7M2
GLUCOSE SERPL-MCNC: 82 MG/DL (ref 70–99)
HCT VFR BLD AUTO: 51.6 % (ref 35–47)
HGB BLD-MCNC: 15.6 G/DL (ref 11.7–15.7)
LYMPHOCYTES # BLD AUTO: 0.8 10E9/L (ref 0.8–5.3)
LYMPHOCYTES NFR BLD AUTO: 15 %
MCH RBC QN AUTO: 30.5 PG (ref 26.5–33)
MCHC RBC AUTO-ENTMCNC: 30.2 G/DL (ref 31.5–36.5)
MCV RBC AUTO: 101 FL (ref 78–100)
MONOCYTES # BLD AUTO: 0.3 10E9/L (ref 0–1.3)
MONOCYTES NFR BLD AUTO: 6 %
NEUTROPHILS # BLD AUTO: 3.9 10E9/L (ref 1.6–8.3)
NEUTROPHILS NFR BLD AUTO: 75 %
PLATELET # BLD AUTO: 145 10E9/L (ref 150–450)
POTASSIUM SERPL-SCNC: 4.3 MMOL/L (ref 3.4–5.3)
PROT SERPL-MCNC: 7.2 G/DL (ref 6.8–8.8)
RBC # BLD AUTO: 5.12 10E12/L (ref 3.8–5.2)
SODIUM SERPL-SCNC: 136 MMOL/L (ref 133–144)
WBC # BLD AUTO: 5.3 10E9/L (ref 4–11)

## 2017-10-05 PROCEDURE — 80053 COMPREHEN METABOLIC PANEL: CPT | Performed by: INTERNAL MEDICINE

## 2017-10-05 PROCEDURE — G0008 ADMIN INFLUENZA VIRUS VAC: HCPCS

## 2017-10-05 PROCEDURE — 99212 OFFICE O/P EST SF 10 MIN: CPT | Mod: 25,ZF

## 2017-10-05 PROCEDURE — 36415 COLL VENOUS BLD VENIPUNCTURE: CPT

## 2017-10-05 PROCEDURE — 25000128 H RX IP 250 OP 636: Mod: ZF | Performed by: INTERNAL MEDICINE

## 2017-10-05 PROCEDURE — 99213 OFFICE O/P EST LOW 20 MIN: CPT | Mod: ZP | Performed by: PHYSICIAN ASSISTANT

## 2017-10-05 PROCEDURE — 85025 COMPLETE CBC W/AUTO DIFF WBC: CPT | Performed by: INTERNAL MEDICINE

## 2017-10-05 PROCEDURE — 90662 IIV NO PRSV INCREASED AG IM: CPT | Mod: ZF | Performed by: INTERNAL MEDICINE

## 2017-10-05 RX ADMIN — INFLUENZA A VIRUSA/MICHIGAN/45/2015 X-275 (H1N1) ANTIGEN (FORMALDEHYDE INACTIVATED), INFLUENZA A VIRUS A/HONG KONG/4801/2014 X-263B (H3N2) ANTIGEN (FORMALDEHYDE INACTIVATED), AND INFLUENZA B VIRUS B/BRISBANE/60/2008 ANTIGEN (FORMALDEHYDE INACTIVATED) 0.5 ML: 60; 60; 60 INJECTION, SUSPENSION INTRAMUSCULAR at 14:26

## 2017-10-05 ASSESSMENT — PAIN SCALES - GENERAL: PAINLEVEL: NO PAIN (0)

## 2017-10-05 NOTE — LETTER
10/5/2017      RE: Elvia Amaro  [address] [facility] [address]       DIAGNOSIS:  Clinical stage III (T4d,N1,M0) infiltrating ductal carcinoma of the left breast diagnosed in 10/2010. ER, [place], HER-2/jessi negative. Patient presented with a 4 cm upper outer quadrant breast mass with inflammatory features and axillary adenopathy on exam. Biopsy proved the infiltrating ductal carcinoma but did not support the inflammatory breast cancer. She began neoadjuvant chemotherapy consisting of 4 cycles of Adriamycin and Cytoxan which was completed 12/14/2010 followed by 4 cycles of Taxol completed 02/15/2011. She then underwent left mastectomy with axillary dissection on 03/21/2011. This was along with a total laparoscopic hysterectomy and bilateral salpingo-oophorectomy. Pathology revealed minute focus of 0.2 cm in diameter, grade 3, invasive, breast carcinoma that was still ER, [place], HER-2/jessi negative. There was extensive fibrosis and histiocytic inflammation due to the treatment. Resection margins were negative. All 21 lymph nodes were negative for malignancy. She completed her chest wall and santiago radiation receiving 6040cGy in July 2011.     INTERVAL HISTORY:  Ms. Amaro returns to the clinic today for followup of her breast carcinoma.  She has done well over the last 6 months.  She is eating and drinking well.  She continues to exercise 3 days a week with her fitness classes.  She denies any chest pain, shortness of breath, cough, nausea, vomiting, abdominal pain or new bone pains.     MEDICATIONS:   Current Outpatient Prescriptions   Medication Sig Dispense Refill     warfarin (COUMADIN) 2.5 MG tablet TAKE 1/2 TABLET (1.25 MG) TUESDAY, SATURDAY AND 1 TABLET ALL OTHER DAYS OR AS DIRECTED BY THE COUMADIN CLINIC. 80 tablet 1     levothyroxine (SYNTHROID/LEVOTHROID) 100 MCG tablet Take 1 tablet (100 mcg) by mouth daily 30 tablet 0     furosemide (LASIX) 20 MG tablet TAKE 1 TABLET BY MOUTH EVER Y DAY 90 tablet 3      "meloxicam (MOBIC) 7.5 MG tablet TAKE 1 TABLET BY MOUTH EVER Y DAY 90 tablet 3     amiodarone (PACERONE/CODARONE) 200 MG tablet TAKE 1 TABLET (200 MG) BY MOUTH DAILY 90 tablet 3     carvedilol (COREG) 12.5 MG tablet TAKE 1 TABLET BY MOUTH TWIC E A DAY WITH MEALS 180 tablet 3     order for DME Equipment being ordered: breast prosthesis and bras 1 Device 0     order for DME Equipment being ordered: Elastic Sleeve   Prosthetics Fax [phone] 1 Device 1     ACE/ARB NOT PRESCRIBED, INTENTIONAL, 1 each every 15 minutes ACE & ARB not prescribed due to Symptomatic hypotension not due to excessive diuresis       Calcium Carbonate-Vitamin D (CALCIUM + D PO) Take  by mouth.       Cholecalciferol (VITAMIN D-1000 MAX ST PO) Take  by mouth.           ALLERGIES:    Allergies   Allergen Reactions     Bacitracin      Skin Irritation     Cephalexin Hcl Itching     Clindamycin Hcl Itching       PHYSICAL EXAMINATION:  Vitals: /72  Pulse 61  Temp 97.3  F (36.3  C) (Oral)  Resp 18  Ht 1.664 m (5' 5.51\")  Wt 101.4 kg (223 lb 8 oz)  SpO2 96%  BMI 36.61 kg/m2  GENERAL:  A pleasant person in no acute distress.   HEENT:  Sclerae are nonicteric.    NECK:  Supple.   LYMPH NODES:  No peripheral lymphadenopathy noted in the axillary, supraclavicular, or cervical regions.   LUNGS:  Clear to auscultation bilaterally.   HEART:  Regular rate and rhythm, with no murmur appreciated.   ABDOMEN:  Bowel sounds are active.  Soft and nontender.  No hepatosplenomegaly or other masses appreciated.  LOWER EXTREMITIES:  Without pitting edema to the knees bilaterally.   NEUROLOGICAL:  Alert/orientated/able to answer all questions.  CN grossly intact.  BREAST:   Right breast normal to inspection, no masses.  Left chest wall, well healed mastectomy incision with telangiectasia.  No masses or nodule on the left anterior chest wall.     LAB:     Ref. Range 10/5/2017 14:15   Latest Ref Range: 133 - 144 mmol/L 136   Latest Ref Range: 3.4 - 5.3 " mmol/L 4.3   Latest Ref Range: 94 - 109 mmol/L 107   Latest Ref Range: 20 - 32 mmol/L 22   Latest Ref Range: 7 - 30 mg/dL 20   Latest Ref Range: 0.52 - 1.04 mg/dL 0.88   Latest Ref Range: >60 mL/min/1.7m2 62   Latest Ref Range: >60 mL/min/1.7m2 75   Latest Ref Range: 8.5 - 10.1 mg/dL 8.9   Latest Ref Range: 3 - 14 mmol/L 6   Latest Ref Range: 3.4 - 5.0 g/dL 3.1 (L)   Latest Ref Range: 6.8 - 8.8 g/dL 7.2   Latest Ref Range: 0.2 - 1.3 mg/dL 0.8   Latest Ref Range: 40 - 150 U/L 139   Latest Ref Range: 0 - 50 U/L 28   Latest Ref Range: 0 - 45 U/L 24   Latest Ref Range: 70 - 99 mg/dL 82   CBC pending.    RADIOLOGY:  Right mammogram 10/05/2017 today: prelim, no cancer.  Final report pending.    IMPRESSION/PLAN:   Clinical stage III (T4 N1 M0) infiltrating ductal carcinoma of the left breast, ER/[place] and HER2/jessi-negative.  Ms. Amaro continues to do well at this time.  She is not having any signs or symptoms that would suggest recurrence of her breast carcinoma.  She had her yearly mammogram today, and preliminary results show no cancer.  We will wait for the final interpretation.  She will continue to exercise 3 days a week.  The patient will follow up with Dr. Padilla in 6 months.  I will see her in a year in the Survivorship [facility] with her mammogram.     ADDENDUM:  TEMPORARY, COMPUTER AIDED DETECTION 10/5/2017  Impression: BI-RADS CATEGORY: 1 -  Negative..  Recommended Followup: Annual Mammography     10/5/2017 14:15   WBC 5.3   Hemoglobin 15.6   Hematocrit 51.6 (H)   Platelet Count 145 (L)   RBC Count 5.12    (H)   MCH 30.5   MCHC 30.2 (L)   RDW 14.1   Diff Method Automated Method   % Neutrophils 75.0   % Lymphocytes 15.0   % Monocytes 6.0   % Eosinophils 3.0   % Basophils 1.0   Absolute Neutrophil 3.9   Absolute Lymphocytes 0.8   Absolute Monocytes 0.3   Absolute Eosinophils 0.2   Absolute Basophils 0.1           Megan Rodríguez PA-C

## 2017-10-05 NOTE — NURSING NOTE
"Oncology Rooming Note    October 5, 2017 2:18 PM   Elvia Amaro is a 77 year old female who presents for:    Chief Complaint   Patient presents with     Blood Draw     Oncology Clinic Visit     Return: Breast CA     Initial Vitals: /72  Pulse 61  Temp 97.3  F (36.3  C) (Oral)  Resp 18  Ht 1.664 m (5' 5.51\")  Wt 101.4 kg (223 lb 8 oz)  SpO2 96%  BMI 36.61 kg/m2 Estimated body mass index is 36.61 kg/(m^2) as calculated from the following:    Height as of this encounter: 1.664 m (5' 5.51\").    Weight as of this encounter: 101.4 kg (223 lb 8 oz). Body surface area is 2.16 meters squared.  No Pain (0) Comment: Data Unavailable   No LMP recorded. Patient is postmenopausal.  Allergies reviewed: Yes  Medications reviewed: Yes    Medications: Medication refills not needed today.  Pharmacy name entered into EPIC:    [id number] - [facility] [address] [place] [address] PHARMACY MAIL DELIVERY - [facility] [address] [facility]    Clinical concerns: no new concerns     6 minutes for nursing intake (face to face time)     Marge Simon CMA    Injectable Influenza Immunization Documentation    1.  Has the patient received the information for the injectable influenza vaccine? YES     2. Is the patient 6 months of age or older? YES     3. Does the patient have any of the following contraindications?         Severe allergy to eggs?  No     Severe allergic reaction to previous influenza vaccines?  No   Severe allergy to latex?  No       History of Guillain-Baldwin syndrome?  No     Currently have a temperature greater than 100.4F?  No        4.  Severely egg allergic patients should have flu vaccine eligibility assessed by an MD, RN, or pharmacist, and those who received flu vaccine should be observed for 15 min by an MD, RN, Pharmacist, Medical Technician, or member of clinic staff.\": NO    5. Latex-allergic patients should be given latex-free influenza vaccine No. Please reference the Vaccine latex " table to determine if your clinic s product is latex-containing.

## 2017-10-05 NOTE — MR AVS SNAPSHOT
After Visit Summary   10/5/2017    Elvia Amaro    MRN: [id number]           Patient Information     Date Of Birth          1940        Visit Information        Provider Department      10/5/2017 2:00 PM Megan Rodríguez PA-C [facility]        Today's Diagnoses     Breast cancer of upper-outer quadrant of left female breast (H)           Follow-ups after your visit        Your next 10 appointments already scheduled     Oct 13, 2017  9:00 AM CDT   [facility] Tej [facility]ley MD   [facility]    [address] [facility] [id number] [phone]           Bring a current list of meds and any records pertaining to this visit. For Physicals, please bring immunization records and any forms needing to be filled out. Please arrive 10 minutes early to complete paperwork.            Oct 20, 2017  9:15 AM CDT   Anticoagulation Visit with  [facility]I [facility]   [facility]    [address] [facility] [id number] [phone]            Apr 05, 2018 12:00 PM CDT   [facility] Draw with  [facility] LAB DRAW   [facility] Lab Draw ([facility])    [address] [place] [id number] [phone]            Apr 05, 2018 12:30 PM CDT   (Arrive by 12:15 PM)   Return Visit with Eiljah Padilla MD   [facility] Cancer [facility] ([facility])    [address] [place] [address] [phone]              Who to contact     If you have questions or need follow up information about today's clinic visit or your schedule please contact [facility] directly at [phone].  Normal or non-critical lab and imaging results will be communicated to you by MyChart, letter or phone within 4 business days after the clinic has received the results. If you do not hear from us within 7 days,  "please contact the clinic through [employer] or phone. If you have a critical or abnormal lab result, we will notify you by phone as soon as possible.  Submit refill requests through [employer] or call your pharmacy and they will forward the refill request to us. Please allow 3 business days for your refill to be completed.          Additional Information About Your Visit        [employer] Information     [employer] lets you send messages to your doctor, view your test results, renew your prescriptions, schedule appointments and more. To sign up, go to [email or web address] . Click on \"Log in\" on the left side of the screen, which will take you to the Welcome page. Then click on \"Sign up Now\" on the right side of the page.     You will be asked to enter the access code listed below, as well as some personal information. Please follow the directions to create your username and password.     Your access code is: [email or web address]  Expires: 2017  9:04 AM     Your access code will  in 90 days. If you need help or a new code, please call your [place] clinic or [phone].        Care EveryWhere ID     This is your Care EveryWhere ID. This could be used by other organizations to access your [place] medical records  [id number]        Your Vitals Were     Pulse Temperature Respirations Height Pulse Oximetry BMI (Body Mass Index)    61 97.3  F (36.3  C) (Oral) 18 1.664 m (5' 5.51\") 96% 36.61 kg/m2       Blood Pressure from Last 3 Encounters:   10/05/17 129/72   08/10/17 128/70   17 132/78    Weight from Last 3 Encounters:   10/05/17 101.4 kg (223 lb 8 oz)   08/10/17 102.7 kg (226 lb 8 oz)   17 103 kg (227 lb)              We Performed the Following     CBC with platelets differential     Comprehensive metabolic panel        Primary Care Provider Office Phone # Fax #    Tej Tilley [phone]       [address] [facility] [address]        Equal Access to Services     SEGUN VALLES AH: Shamar gracia " carlos Valdivia, wawendyda luqadaha, qaybta katommy alvarez, enoch carolin hayaamagdalena salehgume byronralph laronymagdalena argelia. So [place] [phone].    ATENCIÓN: Si habla dinora, tiene a good disposición servicios gratuitos de asistencia lingüística. Lyubov al [phone].    We comply with applicable federal civil rights laws and [place] laws. We do not discriminate on the basis of race, color, national origin, age, disability, sex, sexual orientation, or gender identity.            Thank you!     Thank you for choosing [facility]  for your care. Our goal is always to provide you with excellent care. Hearing back from our patients is one way we can continue to improve our services. Please take a few minutes to complete the written survey that you may receive in the mail after your visit with us. Thank you!             Your Updated Medication List - Protect others around you: Learn how to safely use, store and throw away your medicines at www.disposemymeds.org.          This list is accurate as of: 10/5/17 11:59 PM.  Always use your most recent med list.                   Brand Name Dispense Instructions for use Diagnosis    ACE/ARB NOT PRESCRIBED (INTENTIONAL)      1 each every 15 minutes ACE & ARB not prescribed due to Symptomatic hypotension not due to excessive diuresis    Atrial fibrillation, unspecified       amiodarone 200 MG tablet    PACERONE/CODARONE    90 tablet    TAKE 1 TABLET (200 MG) BY MOUTH DAILY    Atrial fibrillation, unspecified type (H)       CALCIUM + D PO      Take  by mouth.        carvedilol 12.5 MG tablet    COREG    180 tablet    TAKE 1 TABLET BY MOUTH TWIC E A DAY WITH MEALS    Cardiomyopathy (H)       furosemide 20 MG tablet    LASIX    90 tablet    TAKE 1 TABLET BY MOUTH EVER Y DAY    Localized edema, Congenital hypothyroidism without goiter, Acute systolic heart failure (H), Paroxysmal atrial fibrillation (H)       levothyroxine 100 MCG tablet    SYNTHROID/LEVOTHROID    30 tablet     Take 1 tablet (100 mcg) by mouth daily    Congenital hypothyroidism without goiter, Localized edema, Acute systolic heart failure (H), Paroxysmal atrial fibrillation (H)       meloxicam 7.5 MG tablet    MOBIC    90 tablet    TAKE 1 TABLET BY MOUTH EVER Y DAY    Degeneration of lumbar or lumbosacral intervertebral disc       * order for DME     1 Device    Equipment being ordered: Elastic Sleeve  Prosthetics Fax [phone]    Malignant neoplasm of left female breast, unspecified site of breast       * order for DME     1 Device    Equipment being ordered: breast prosthesis and bras    S/P left mastectomy, Malignant neoplasm of left female breast, unspecified site of breast       VITAMIN D-1000 MAX ST PO      Take  by mouth.        warfarin 2.5 MG tablet    COUMADIN    80 tablet    TAKE 1/2 TABLET (1.25 MG) TUESDAY, SATURDAY AND 1 TABLET ALL OTHER DAYS OR AS DIRECTED BY THE COUMADIN CLINIC.    Paroxysmal atrial fibrillation (H), Long term current use of anticoagulant therapy       * Notice:  This list has 2 medication(s) that are the same as other medications prescribed for you. Read the directions carefully, and ask your doctor or other care provider to review them with you.

## 2017-10-05 NOTE — NURSING NOTE
Flu vaccination given today in right arm. Patient tolerated well.  Marge Simon, [facility]

## 2017-10-05 NOTE — Clinical Note
10/5/2017       RE: Elvia Amaro  [address] [facility] [address]     Dear Colleague,    Thank you for referring your patient, Elvia Amaro, to the [facility]. Please see a copy of my visit note below.    Clinical stage III (T4d,N1,M0) infiltrating ductal carcinoma of the left breast diagnosed in 10/2010. ER, CA, HER-2/jessi negative. Patient presented with a 4 cm upper outer quadrant breast mass with inflammatory features and axillary adenopathy on exam. Biopsy proved the infiltrating ductal carcinoma but did not support the inflammatory breast cancer. She began neoadjuvant chemotherapy consisting of 4 cycles of Adriamycin and Cytoxan which was completed 12/14/2010 followed by 4 cycles of Taxol completed 02/15/2011. She then underwent left mastectomy with axillary dissection on 03/21/2011. This was along with a total laparoscopic hysterectomy and bilateral salpingo-oophorectomy. Pathology revealed minute focus of 0.2 cm in diameter, grade 3, invasive, breast carcinoma that was still ER, CA, HER-2/jessi negative. There was extensive fibrosis and histiocytic inflammation due to the treatment. Resection margins were negative. All 21 lymph nodes were negative for malignancy. She completed her chest wall and santiago radiation receiving 6040cGy in July 2011.     Again, thank you for allowing me to participate in the care of your patient.      Sincerely,    Megan Rodríguez PA-C

## 2017-10-05 NOTE — NURSING NOTE
vpt done by RN in right hand, pt tolerated well. VS taken and pt checked into next appt. Marilia Cabrera

## 2017-10-05 NOTE — PROGRESS NOTES
DIAGNOSIS:  Clinical stage III (T4d,N1,M0) infiltrating ductal carcinoma of the left breast diagnosed in 10/2010. ER, [place], HER-2/jessi negative. Patient presented with a 4 cm upper outer quadrant breast mass with inflammatory features and axillary adenopathy on exam. Biopsy proved the infiltrating ductal carcinoma but did not support the inflammatory breast cancer. She began neoadjuvant chemotherapy consisting of 4 cycles of Adriamycin and Cytoxan which was completed 12/14/2010 followed by 4 cycles of Taxol completed 02/15/2011. She then underwent left mastectomy with axillary dissection on 03/21/2011. This was along with a total laparoscopic hysterectomy and bilateral salpingo-oophorectomy. Pathology revealed minute focus of 0.2 cm in diameter, grade 3, invasive, breast carcinoma that was still ER, [place], HER-2/jessi negative. There was extensive fibrosis and histiocytic inflammation due to the treatment. Resection margins were negative. All 21 lymph nodes were negative for malignancy. She completed her chest wall and santiago radiation receiving 6040cGy in July 2011.     INTERVAL HISTORY:  Ms. Amaro returns to the clinic today for followup of her breast carcinoma.  She has done well over the last 6 months.  She is eating and drinking well.  She continues to exercise 3 days a week with her fitness classes.  She denies any chest pain, shortness of breath, cough, nausea, vomiting, abdominal pain or new bone pains.     MEDICATIONS:   Current Outpatient Prescriptions   Medication Sig Dispense Refill     warfarin (COUMADIN) 2.5 MG tablet TAKE 1/2 TABLET (1.25 MG) TUESDAY, SATURDAY AND 1 TABLET ALL OTHER DAYS OR AS DIRECTED BY THE COUMADIN CLINIC. 80 tablet 1     levothyroxine (SYNTHROID/LEVOTHROID) 100 MCG tablet Take 1 tablet (100 mcg) by mouth daily 30 tablet 0     furosemide (LASIX) 20 MG tablet TAKE 1 TABLET BY MOUTH EVER Y DAY 90 tablet 3     meloxicam (MOBIC) 7.5 MG tablet TAKE 1 TABLET BY MOUTH EVER Y DAY 90 tablet 3      "amiodarone (PACERONE/CODARONE) 200 MG tablet TAKE 1 TABLET (200 MG) BY MOUTH DAILY 90 tablet 3     carvedilol (COREG) 12.5 MG tablet TAKE 1 TABLET BY MOUTH TWIC E A DAY WITH MEALS 180 tablet 3     order for DME Equipment being ordered: breast prosthesis and bras 1 Device 0     order for DME Equipment being ordered: Elastic Sleeve   Prosthetics Fax [phone] 1 Device 1     ACE/ARB NOT PRESCRIBED, INTENTIONAL, 1 each every 15 minutes ACE & ARB not prescribed due to Symptomatic hypotension not due to excessive diuresis       Calcium Carbonate-Vitamin D (CALCIUM + D PO) Take  by mouth.       Cholecalciferol (VITAMIN D-1000 MAX ST PO) Take  by mouth.           ALLERGIES:    Allergies   Allergen Reactions     Bacitracin      Skin Irritation     Cephalexin Hcl Itching     Clindamycin Hcl Itching       PHYSICAL EXAMINATION:  Vitals: /72  Pulse 61  Temp 97.3  F (36.3  C) (Oral)  Resp 18  Ht 1.664 m (5' 5.51\")  Wt 101.4 kg (223 lb 8 oz)  SpO2 96%  BMI 36.61 kg/m2  GENERAL:  A pleasant person in no acute distress.   HEENT:  Sclerae are nonicteric.    NECK:  Supple.   LYMPH NODES:  No peripheral lymphadenopathy noted in the axillary, supraclavicular, or cervical regions.   LUNGS:  Clear to auscultation bilaterally.   HEART:  Regular rate and rhythm, with no murmur appreciated.   ABDOMEN:  Bowel sounds are active.  Soft and nontender.  No hepatosplenomegaly or other masses appreciated.  LOWER EXTREMITIES:  Without pitting edema to the knees bilaterally.   NEUROLOGICAL:  Alert/orientated/able to answer all questions.  CN grossly intact.  BREAST:   Right breast normal to inspection, no masses.  Left chest wall, well healed mastectomy incision with telangiectasia.  No masses or nodule on the left anterior chest wall.     LAB:     Ref. Range 10/5/2017 14:15   Latest Ref Range: 133 - 144 mmol/L 136   Latest Ref Range: 3.4 - 5.3 mmol/L 4.3   Latest Ref Range: 94 - 109 mmol/L 107   Latest Ref Range: 20 - 32 mmol/L " 22   Latest Ref Range: 7 - 30 mg/dL 20   Latest Ref Range: 0.52 - 1.04 mg/dL 0.88   Latest Ref Range: >60 mL/min/1.7m2 62   Latest Ref Range: >60 mL/min/1.7m2 75   Latest Ref Range: 8.5 - 10.1 mg/dL 8.9   Latest Ref Range: 3 - 14 mmol/L 6   Latest Ref Range: 3.4 - 5.0 g/dL 3.1 (L)   Latest Ref Range: 6.8 - 8.8 g/dL 7.2   Latest Ref Range: 0.2 - 1.3 mg/dL 0.8   Latest Ref Range: 40 - 150 U/L 139   Latest Ref Range: 0 - 50 U/L 28   Latest Ref Range: 0 - 45 U/L 24   Latest Ref Range: 70 - 99 mg/dL 82   CBC pending.    RADIOLOGY:  Right mammogram 10/05/2017 today: prelim, no cancer.  Final report pending.    IMPRESSION/PLAN:   Clinical stage III (T4 N1 M0) infiltrating ductal carcinoma of the left breast, ER/[place] and HER2/jessi-negative.  Ms. Amaro continues to do well at this time.  She is not having any signs or symptoms that would suggest recurrence of her breast carcinoma.  She had her yearly mammogram today, and preliminary results show no cancer.  We will wait for the final interpretation.  She will continue to exercise 3 days a week.  The patient will follow up with Dr. Padilla in 6 months.  I will see her in a year in the Survivorship Clinic with her mammogram.     ADDENDUM:  TEMPORARY, COMPUTER AIDED DETECTION 10/5/2017  Impression: BI-RADS CATEGORY: 1 -  Negative..  Recommended Followup: Annual Mammography     10/5/2017 14:15   WBC 5.3   Hemoglobin 15.6   Hematocrit 51.6 (H)   Platelet Count 145 (L)   RBC Count 5.12    (H)   MCH 30.5   MCHC 30.2 (L)   RDW 14.1   Diff Method Automated Method   % Neutrophils 75.0   % Lymphocytes 15.0   % Monocytes 6.0   % Eosinophils 3.0   % Basophils 1.0   Absolute Neutrophil 3.9   Absolute Lymphocytes 0.8   Absolute Monocytes 0.3   Absolute Eosinophils 0.2   Absolute Basophils 0.1

## 2017-10-13 ENCOUNTER — OFFICE VISIT (OUTPATIENT)
Dept: FAMILY MEDICINE | Facility: OTHER | Age: 77
End: 2017-10-13
Payer: COMMERCIAL

## 2017-10-13 VITALS
DIASTOLIC BLOOD PRESSURE: 70 MMHG | RESPIRATION RATE: 16 BRPM | BODY MASS INDEX: 36.61 KG/M2 | OXYGEN SATURATION: 95 % | HEART RATE: 59 BPM | TEMPERATURE: 96.6 F | WEIGHT: 223.5 LBS | SYSTOLIC BLOOD PRESSURE: 130 MMHG

## 2017-10-13 DIAGNOSIS — I48.0 PAROXYSMAL ATRIAL FIBRILLATION (H): ICD-10-CM

## 2017-10-13 DIAGNOSIS — E03.1 CONGENITAL HYPOTHYROIDISM WITHOUT GOITER: Primary | ICD-10-CM

## 2017-10-13 DIAGNOSIS — R60.0 LOCALIZED EDEMA: ICD-10-CM

## 2017-10-13 DIAGNOSIS — I50.21 ACUTE SYSTOLIC HEART FAILURE (H): ICD-10-CM

## 2017-10-13 PROBLEM — E66.01 MORBID OBESITY (H): Status: ACTIVE | Noted: 2017-10-13

## 2017-10-13 LAB
T4 FREE SERPL-MCNC: 1.53 NG/DL (ref 0.76–1.46)
TSH SERPL DL<=0.005 MIU/L-ACNC: 8 MU/L (ref 0.4–4)

## 2017-10-13 PROCEDURE — 84443 ASSAY THYROID STIM HORMONE: CPT | Performed by: FAMILY MEDICINE

## 2017-10-13 PROCEDURE — 36415 COLL VENOUS BLD VENIPUNCTURE: CPT | Performed by: FAMILY MEDICINE

## 2017-10-13 PROCEDURE — 99214 OFFICE O/P EST MOD 30 MIN: CPT | Performed by: FAMILY MEDICINE

## 2017-10-13 PROCEDURE — 84439 ASSAY OF FREE THYROXINE: CPT | Performed by: FAMILY MEDICINE

## 2017-10-13 ASSESSMENT — PAIN SCALES - GENERAL: PAINLEVEL: NO PAIN (0)

## 2017-10-13 NOTE — PATIENT INSTRUCTIONS
Common Thyroid Problems    The thyroid is a gland in the neck. It makes thyroid hormone. A hormone is a chemical messenger for the body. If there is a problem with the thyroid, the level of thyroid hormone may change. This can lead to symptoms.  Hypothyroidism  This is when the thyroid gland doesn t make enough hormone. The most common cause of hypothyroidism is Hashimoto thyroiditis. This occurs when the body s immune system attacks the thyroid gland. Hypothyroidism may also occur if there s a severe deficiency of iodine in the body. The thyroid needs iodine to make hormone. Problems with the pituitary gland can lead to not enough production of thyroid hormone. Hypothyroidism will also occur if the thyroid gland is removed during surgery.  Common symptoms include:    Low energy and tiredness    Depression    Feeling cold    Muscle pain    Slowed thinking        Constipation    Heavier menstrual periods with prolonged bleeding     Weight gain    Dry and brittle skin, hair, nails    Excessive sleepiness  Hyperthyroidism  This is when the thyroid gland makes too much hormone. The most common cause is Graves disease. This is due to the body s immune system telling the thyroid to make too much hormone. Another cause is a small bump (nodule) in the thyroid gland. This can cause hyperthyroidism if the cells in the nodule make too much thyroid hormone and stop hormone production in the rest of the thyroid gland.  Common symptoms include:    Shaking, nervousness, irritability    Feeling hot    A rapid, irregular heartbeat    Muscle weakness, fatigue    More frequent bowel movements    [place], lighter menstrual periods    Weight loss    Hair loss    Bulging eyes  Nodules  Nodules are lumps of tissue in the thyroid gland. Most often, the cause of nodules isn t known. But they may be more common in people who ve had medical radiation to the head or neck. Sometimes nodules can be felt on the outside of the neck. Most of the  time, cause no symptoms and don t affect the amount of thyroid hormone. Most nodules are not cancer. But sometimes a nodule may be cancer.  What is a goiter?  A goiter is the enlargement of the thyroid gland. When the gland enlarges, you may see or feel a swelling on your neck. A goiter can develop in someone with a normal thyroid, overactive thyroid, or underactive thyroid.   Date Last Reviewed: 11/1/2016 2000-2017 [employer]. [address] [place] [address]. All rights reserved. This information is not intended as a substitute for professional medical care. Always follow your healthcare professional's instructions.

## 2017-10-13 NOTE — NURSING NOTE
"Chief Complaint   Patient presents with     Thyroid Problem       Initial /70 (BP Location: Right arm, Patient Position: Chair, Cuff Size: Adult Large)  Pulse 59  Temp 96.6  F (35.9  C) (Temporal)  Resp 16  Wt 223 lb 8 oz (101.4 kg)  SpO2 95%  BMI 36.61 kg/m2 Estimated body mass index is 36.61 kg/(m^2) as calculated from the following:    Height as of 10/5/17: 5' 5.51\" (1.664 m).    Weight as of this encounter: 223 lb 8 oz (101.4 kg).  Medication Reconciliation: complete     Lakesha Mario MA 10/13/2017  9:20 AM          "

## 2017-10-13 NOTE — MR AVS SNAPSHOT
After Visit Summary   10/13/2017    Elvia Amaro    MRN: [id number]           Patient Information     Date Of Birth          1940        Visit Information        Provider Department      10/13/2017 9:00 AM Tej Tilley MD [facility]        Today's Diagnoses     Congenital hypothyroidism without goiter    -  1      Care Instructions      Common Thyroid Problems    The thyroid is a gland in the neck. It makes thyroid hormone. A hormone is a chemical messenger for the body. If there is a problem with the thyroid, the level of thyroid hormone may change. This can lead to symptoms.  Hypothyroidism  This is when the thyroid gland doesn t make enough hormone. The most common cause of hypothyroidism is Hashimoto thyroiditis. This occurs when the body s immune system attacks the thyroid gland. Hypothyroidism may also occur if there s a severe deficiency of iodine in the body. The thyroid needs iodine to make hormone. Problems with the pituitary gland can lead to not enough production of thyroid hormone. Hypothyroidism will also occur if the thyroid gland is removed during surgery.  Common symptoms include:    Low energy and tiredness    Depression    Feeling cold    Muscle pain    Slowed thinking        Constipation    Heavier menstrual periods with prolonged bleeding     Weight gain    Dry and brittle skin, hair, nails    Excessive sleepiness  Hyperthyroidism  This is when the thyroid gland makes too much hormone. The most common cause is Graves disease. This is due to the body s immune system telling the thyroid to make too much hormone. Another cause is a small bump (nodule) in the thyroid gland. This can cause hyperthyroidism if the cells in the nodule make too much thyroid hormone and stop hormone production in the rest of the thyroid gland.  Common symptoms include:    Shaking, nervousness, irritability    Feeling hot    A rapid, irregular heartbeat    Muscle weakness,  fatigue    More frequent bowel movements    [place], lighter menstrual periods    Weight loss    Hair loss    Bulging eyes  Nodules  Nodules are lumps of tissue in the thyroid gland. Most often, the cause of nodules isn t known. But they may be more common in people who ve had medical radiation to the head or neck. Sometimes nodules can be felt on the outside of the neck. Most of the time, cause no symptoms and don t affect the amount of thyroid hormone. Most nodules are not cancer. But sometimes a nodule may be cancer.  What is a goiter?  A goiter is the enlargement of the thyroid gland. When the gland enlarges, you may see or feel a swelling on your neck. A goiter can develop in someone with a normal thyroid, overactive thyroid, or underactive thyroid.   Date Last Reviewed: 11/1/2016 2000-2017 [employer]. [address] [place] [address]. All rights reserved. This information is not intended as a substitute for professional medical care. Always follow your healthcare professional's instructions.                Follow-ups after your visit        Follow-up notes from your care team     Return in about 6 months (around 4/13/2018) for recheck thyroid .      Your next 10 appointments already scheduled     Oct 20, 2017  9:15 AM CDT   Anticoagulation Visit with  ANTI COAG   [facility]    [address] [facility] [address] [phone]            Apr 05, 2018 12:00 PM CDT   [facility] Lab Draw with  [facility] D[facility]   [facility] ([facility])    [address] [id number] [phone]            Apr 05, 2018 12:30 PM CDT   (Arrive by 12:15 PM)   Return Visit with Elijah Padilla MD   [facility] ([facility])    [address] [id number] [phone]              Who to contact     If you have  "questions or need follow up information about today's clinic visit or your schedule please contact [facility] directly at [phone].  Normal or non-critical lab and imaging results will be communicated to you by [employer], letter or phone within 4 business days after the clinic has received the results. If you do not hear from us within 7 days, please contact the clinic through [employer] or phone. If you have a critical or abnormal lab result, we will notify you by phone as soon as possible.  Submit refill requests through [employer] or call your pharmacy and they will forward the refill request to us. Please allow 3 business days for your refill to be completed.          Additional Information About Your Visit        [employer] Information     [employer] lets you send messages to your doctor, view your test results, renew your prescriptions, schedule appointments and more. To sign up, go to [email or web address] . Click on \"Log in\" on the left side of the screen, which will take you to the Welcome page. Then click on \"Sign up Now\" on the right side of the page.     You will be asked to enter the access code listed below, as well as some personal information. Please follow the directions to create your username and password.     Your access code is: [email or web address]  Expires: 2017  9:04 AM     Your access code will  in 90 days. If you need help or a new code, please call your [facility] clinic or [phone].        Care EveryWhere ID     This is your Care EveryWhere ID. This could be used by other organizations to access your [facility] medical records  [id number]        Your Vitals Were     Pulse Temperature Respirations Pulse Oximetry BMI (Body Mass Index)       59 96.6  F (35.9  C) (Temporal) 16 95% 36.61 kg/m2        Blood Pressure from Last 3 Encounters:   10/13/17 130/70   10/05/17 129/72   08/10/17 128/70    Weight from Last 3 Encounters:   10/13/17 223 lb 8 oz (101.4 kg)   10/05/17 223 lb 8 oz " (101.4 kg)   08/10/17 226 lb 8 oz (102.7 kg)              We Performed the Following     TSH with free T4 reflex        Primary Care Provider Office Phone # Fax #    Tej Tilley [phone]       [address] [facility] [address]        Equal Access to Services     SEGUN VALLES : Hadii aad ku hadasho Soomaali, waaxda luqadaha, qaybta kaalmada adeegyada, waxay carolin hayjefferyn norm matthewsalisongio stout. So [facility] [phone].    ATENCIÓN: Si habla español, tiene a good disposición servicios gratuitos de asistencia lingüística. Llame al [phone].    We comply with applicable federal civil rights laws and [place] laws. We do not discriminate on the basis of race, color, national origin, age, disability, sex, sexual orientation, or gender identity.            Thank you!     Thank you for choosing [facility]  for your care. Our goal is always to provide you with excellent care. Hearing back from our patients is one way we can continue to improve our services. Please take a few minutes to complete the written survey that you may receive in the mail after your visit with us. Thank you!             Your Updated Medication List - Protect others around you: Learn how to safely use, store and throw away your medicines at www.disposemymeds.org.          This list is accurate as of: 10/13/17  9:46 AM.  Always use your most recent med list.                   Brand Name Dispense Instructions for use Diagnosis    ACE/ARB NOT PRESCRIBED (INTENTIONAL)      1 each every 15 minutes ACE & ARB not prescribed due to Symptomatic hypotension not due to excessive diuresis    Atrial fibrillation, unspecified       amiodarone 200 MG tablet    PACERONE/CODARONE    90 tablet    TAKE 1 TABLET (200 MG) BY MOUTH DAILY    Atrial fibrillation, unspecified type (H)       CALCIUM + D PO      Take  by mouth.        carvedilol 12.5 MG tablet    COREG    180 tablet    TAKE 1 TABLET BY MOUTH TWIC E A DAY WITH MEALS     Cardiomyopathy (H)       furosemide 20 MG tablet    LASIX    90 tablet    TAKE 1 TABLET BY MOUTH EVER Y DAY    Localized edema, Congenital hypothyroidism without goiter, Acute systolic heart failure (H), Paroxysmal atrial fibrillation (H)       levothyroxine 100 MCG tablet    SYNTHROID/LEVOTHROID    30 tablet    Take 1 tablet (100 mcg) by mouth daily    Congenital hypothyroidism without goiter, Localized edema, Acute systolic heart failure (H), Paroxysmal atrial fibrillation (H)       meloxicam 7.5 MG tablet    MOBIC    90 tablet    TAKE 1 TABLET BY MOUTH EVER Y DAY    Degeneration of lumbar or lumbosacral intervertebral disc       * order for DME     1 Device    Equipment being ordered: Elastic Sleeve  Prosthetics Fax [phone]    Malignant neoplasm of left female breast, unspecified site of breast       * order for DME     1 Device    Equipment being ordered: breast prosthesis and bras    S/P left mastectomy, Malignant neoplasm of left female breast, unspecified site of breast       VITAMIN D-1000 MAX ST PO      Take  by mouth.        warfarin 2.5 MG tablet    COUMADIN    80 tablet    TAKE 1/2 TABLET (1.25 MG) TUESDAY, SATURDAY AND 1 TABLET ALL OTHER DAYS OR AS DIRECTED BY THE COUMADIN CLINIC.    Paroxysmal atrial fibrillation (H), Long term current use of anticoagulant therapy       * Notice:  This list has 2 medication(s) that are the same as other medications prescribed for you. Read the directions carefully, and ask your doctor or other care provider to review them with you.

## 2017-10-13 NOTE — PROGRESS NOTES
SUBJECTIVE:   Elvia Amaro is a 77 year old female who presents to clinic today for the following health issues:        Hypothyroidism Follow-up      Since last visit, patient describes the following symptoms: Weight stable, no hair loss, no skin changes, no constipation, no loose stools      Amount of exercise or physical activity: 2-3 days/week for an average of greater than 60 minutes    Problems taking medications regularly: No    Medication side effects: none    Diet: regular (no restrictions)            Problem list and histories reviewed & adjusted, as indicated.  Additional history: She was recently seen by cardiology and oncology and she is doing well. She will be following up with both in 6 months.    Patient Active Problem List   Diagnosis     Endometrial hyperplasia     CARDIOVASCULAR SCREENING; LDL GOAL LESS THAN 130     Hypertension goal BP (blood pressure) < 140/90     Advanced directives, counseling/discussion     Skin exam, screening for cancer     Atrial fibrillation (H)     Acute systolic heart failure (H)     Paroxysmal atrial fibrillation (H)     Congenital hypothyroidism without goiter     Edema     Long-term (current) use of anticoagulants [Z79.01]     Breast cancer of upper-outer quadrant of left female breast (H)     Past Surgical History:   Procedure Laterality Date     ANESTHESIA CARDIOVERSION N/A 9/18/2015    Procedure: ANESTHESIA CARDIOVERSION;  Surgeon: GENERIC ANESTHESIA PROVIDER;  Location: [facility] OR     BIOPSY SKIN (LOCATION)      left leg     BREAST SURGERY       COLONOSCOPY  8/29/2011    Procedure:COLONOSCOPY; colonoscopy; Surgeon:VICKIE DUFF; Location:[facility]     HC HYSTEROSCOPY W ENDOMETRIAL BX/POLYPECTOMY W/WO D&C  9/30/2005    Hysteroscopy. D&C. Cervical biopsies.     HC REMOVAL OF TONSILS,<13 Y/O      Tonsils <10y.o.     LAPAROSCOPIC HYSTERECTOMY TOTAL, BILATERAL SALPINGO-OOPHORECTOMY, COMBINED  3/21/11     MASTECTOMY  3/21/11    left mastectomy, lymph node dissection      MOHS MICROGRAPHIC PROCEDURE       NO HISTORY OF SURGERY       REMOVE CATHETER VASCULAR ACCESS  5/4/2011    Procedure:REMOVE CATHETER VASCULAR ACCESS; right subclavian; Surgeon:ESTER ROPER; Location:[facility] OR     VASCULAR SURGERY      port       Social History   Substance Use Topics     Smoking status: Never Smoker     Smokeless tobacco: Never Used     Alcohol use No     Family History   Problem Relation Age of Onset     DIABETES Father      CEREBROVASCULAR DISEASE Father      HEART DISEASE Mother      MI age 68     CANCER No family hx of      no skin cancer         Current Outpatient Prescriptions   Medication Sig Dispense Refill     warfarin (COUMADIN) 2.5 MG tablet TAKE 1/2 TABLET (1.25 MG) TUESDAY, SATURDAY AND 1 TABLET ALL OTHER DAYS OR AS DIRECTED BY THE COUMADIN CLINIC. 80 tablet 1     levothyroxine (SYNTHROID/LEVOTHROID) 100 MCG tablet Take 1 tablet (100 mcg) by mouth daily 30 tablet 0     furosemide (LASIX) 20 MG tablet TAKE 1 TABLET BY MOUTH EVER Y DAY 90 tablet 3     meloxicam (MOBIC) 7.5 MG tablet TAKE 1 TABLET BY MOUTH EVER Y DAY 90 tablet 3     amiodarone (PACERONE/CODARONE) 200 MG tablet TAKE 1 TABLET (200 MG) BY MOUTH DAILY 90 tablet 3     carvedilol (COREG) 12.5 MG tablet TAKE 1 TABLET BY MOUTH TWIC E A DAY WITH MEALS 180 tablet 3     order for DME Equipment being ordered: breast prosthesis and bras 1 Device 0     order for DME Equipment being ordered: Elastic Sleeve   Prosthetics Fax [phone] 1 Device 1     ACE/ARB NOT PRESCRIBED, INTENTIONAL, 1 each every 15 minutes ACE & ARB not prescribed due to Symptomatic hypotension not due to excessive diuresis       Calcium Carbonate-Vitamin D (CALCIUM + D PO) Take  by mouth.       Cholecalciferol (VITAMIN D-1000 MAX ST PO) Take  by mouth.       Allergies   Allergen Reactions     Bacitracin      Skin Irritation     Cephalexin Hcl Itching     Clindamycin Hcl Itching     Recent Labs   Lab Test  10/05/17   1415  08/10/17   0809  04/07/17   0845   04/06/17   1158   07/24/13   0801   LDL   --    --    --    --    --   124   HDL   --    --    --    --    --   42*   TRIG   --    --    --    --    --   63   ALT  28  24   --   21   < >   --    CR  0.88  0.95   --   0.96   < >   --    GFRESTIMATED  62  56*   --   56*   < >   --    GFRESTBLACK  75  68   --   68   < >   --    POTASSIUM  4.3  4.1   --   4.0   < >   --    TSH   --   8.94*  9.35*   --    < >   --     < > = values in this interval not displayed.      BP Readings from Last 3 Encounters:   10/13/17 130/70   10/05/17 129/72   08/10/17 128/70    Wt Readings from Last 3 Encounters:   10/13/17 223 lb 8 oz (101.4 kg)   10/05/17 223 lb 8 oz (101.4 kg)   08/10/17 226 lb 8 oz (102.7 kg)                  Labs reviewed in EPIC          Reviewed and updated as needed this visit by clinical staffTobacco  Allergies  Meds  Problems  Med Hx  Surg Hx  Fam Hx  Soc Hx        Reviewed and updated as needed this visit by Provider  Allergies  Meds  Problems         ROS:  CONSTITUTIONAL:POSITIVE  for fatigue and NEGATIVE  for arthralgias, fatigue, malaise, myalgias, sweats, weight gain and weight loss  E/M: NEGATIVE for ear, mouth and throat problems  R: NEGATIVE for significant cough or SOB  CV: NEGATIVE for chest pain, palpitations or peripheral edema  ENDOCRINE: POSITIVE  for HX thyroid disease and NEGATIVE for cold intolerance, constipation, diarrhea, galactorrhea, hair loss, heat intolerance, hot flashes, night sweats, palpitations, paresthesias, weight gain and weight loss  ROS otherwise negative    OBJECTIVE:     /70 (BP Location: Right arm, Patient Position: Chair, Cuff Size: Adult Large)  Pulse 59  Temp 96.6  F (35.9  C) (Temporal)  Resp 16  Wt 223 lb 8 oz (101.4 kg)  SpO2 95%  BMI 36.61 kg/m2  Body mass index is 36.61 kg/(m^2).  GENERAL: healthy, alert and no distress  NECK: no adenopathy, no asymmetry, masses, or scars and thyroid normal to palpation  RESP: lungs clear to auscultation - no  rales, rhonchi or wheezes  CV: regular rate and rhythm, normal S1 S2, no S3 or S4, no murmur, click or rub, no peripheral edema and peripheral pulses strong  ABDOMEN: soft, nontender, no hepatosplenomegaly, no masses and bowel sounds normal  MS: no gross musculoskeletal defects noted, no edema    Diagnostic Test Results:  No results found for this or any previous visit (from the past 24 hour(s)).    ASSESSMENT/PLAN:     Hypothyroidism; controlled/euthyroid   Plan:  No changes in the patient's current treatment plan  Labs:  - TSH with free T4 reflex    FUTURE APPOINTMENTS:       - Follow-up visit in 6 months.  SELF MONITORING:       - Please check blood pressure readings daily       - Daily weights  Work on weight loss  Regular exercise    Tej Tilley MD  [facility]

## 2017-10-16 ENCOUNTER — CARE COORDINATION (OUTPATIENT)
Dept: ONCOLOGY | Facility: CLINIC | Age: 77
End: 2017-10-16

## 2017-10-16 NOTE — PROGRESS NOTES
Spoke to patient with Dr Padilla's recommendations to see Dr Nahum Bowles for polycythemia.  Answered all patient's questions and verbalized understanding. Alejandrina Gar RN, BSN.

## 2017-10-17 RX ORDER — LEVOTHYROXINE SODIUM 100 UG/1
100 TABLET ORAL DAILY
Qty: 90 TABLET | Refills: 1 | Status: SHIPPED | OUTPATIENT
Start: 2017-10-17 | End: 2018-04-12

## 2017-10-17 NOTE — PROGRESS NOTES
Patient notified of lab results.  Thyroid function stable, TSH slowly drifting down and T4 level stable. The current medical regimen is effective;  continue present plan and medications.  Recheck TSH and T4 in 6 months.  Electronically signed by Tej Tilley MD

## 2017-10-20 ENCOUNTER — ANTICOAGULATION THERAPY VISIT (OUTPATIENT)
Dept: ANTICOAGULATION | Facility: OTHER | Age: 77
End: 2017-10-20
Payer: COMMERCIAL

## 2017-10-20 DIAGNOSIS — Z79.01 LONG-TERM (CURRENT) USE OF ANTICOAGULANTS: ICD-10-CM

## 2017-10-20 DIAGNOSIS — I48.91 ATRIAL FIBRILLATION, UNSPECIFIED TYPE (H): ICD-10-CM

## 2017-10-20 LAB — INR POINT OF CARE: 2.3 (ref 0.86–1.14)

## 2017-10-20 PROCEDURE — 99207 ZZC NO CHARGE NURSE ONLY: CPT

## 2017-10-20 PROCEDURE — 36416 COLLJ CAPILLARY BLOOD SPEC: CPT

## 2017-10-20 PROCEDURE — 85610 PROTHROMBIN TIME: CPT | Mod: QW

## 2017-10-20 NOTE — MR AVS SNAPSHOT
Elvianoel Amaro   10/20/2017 9:15 AM   Anticoagulation Therapy Visit    Description:  77 year old female   Provider:  [facility]   Department:  [facility]           INR as of 10/20/2017     Today's INR 2.3      Anticoagulation Summary as of 10/20/2017     INR goal 2.0-3.0   Today's INR 2.3   Full instructions 1.25 mg on Tue, Sat; 2.5 mg all other days   Next INR check 11/29/2017    Indications   Long-term (current) use of anticoagulants [Z79.01] [Z79.01]  Atrial fibrillation (H) [I48.91]         Your next Anticoagulation Clinic appointment(s)     Nov 29, 2017  9:15 AM CST   An[facility] with [facility] ANTI [facility]   [facility]    [address] [facility] [id number] [phone]              Contact Numbers     Clinic Number:         October 2017 Details    Sun Mon Tue Wed Thu Fri Sat     1               2               3               4               5               6               7                 8               9               10               11               12               13               14                 15               16               17               18               19               20      2.5 mg   See details      21      1.25 mg           22      2.5 mg         23      2.5 mg         24      1.25 mg         25      2.5 mg         26      2.5 mg         27      2.5 mg         28      1.25 mg           29      2.5 mg         30      2.5 mg         31      1.25 mg              Date Details   10/20 This INR check               How to take your warfarin dose     To take:  1.25 mg Take 0.5 of a 2.5 mg tablet.    To take:  2.5 mg Take 1 of the 2.5 mg tablets.           November 2017 Details    Sun Mon Tue Wed Thu Fri Sat        1      2.5 mg         2      2.5 mg         3      2.5 mg         4      1.25 mg           5      2.5 mg         6      2.5 mg         7      1.25 mg         8      2.5 mg         9      2.5 mg         10      2.5 mg         11       1.25 mg           12      2.5 mg         13      2.5 mg         14      1.25 mg         15      2.5 mg         16      2.5 mg         17      2.5 mg         18      1.25 mg           19      2.5 mg         20      2.5 mg         21      1.25 mg         22      2.5 mg         23      2.5 mg         24      2.5 mg         25      1.25 mg           26      2.5 mg         27      2.5 mg         28      1.25 mg         29            30                  Date Details   No additional details    Date of next INR:  11/29/2017         How to take your warfarin dose     To take:  1.25 mg Take 0.5 of a 2.5 mg tablet.    To take:  2.5 mg Take 1 of the 2.5 mg tablets.

## 2017-10-20 NOTE — PROGRESS NOTES
ANTICOAGULATION FOLLOW-UP CLINIC VISIT    Patient Name:  Elvia Amaro  Date:  10/20/2017  Contact Type:  Face to Face    SUBJECTIVE:     Patient Findings     Positives No Problem Findings           OBJECTIVE    INR Protime   Date Value Ref Range Status   10/20/2017 2.3 (A) 0.86 - 1.14 Final       ASSESSMENT / PLAN  INR assessment THER    Recheck INR In: 6 WEEKS    INR Location Clinic      Anticoagulation Summary as of 10/20/2017     INR goal 2.0-3.0   Today's INR 2.3   Maintenance plan 1.25 mg (2.5 mg x 0.5) on Tue, Sat; 2.5 mg (2.5 mg x 1) all other days   Full instructions 1.25 mg on Tue, Sat; 2.5 mg all other days   Weekly total 15 mg   No change documented Campos Carrera RN   Plan last modified Campos Carrera RN (3/6/2017)   Next INR check 11/29/2017   Target end date     Indications   Long-term (current) use of anticoagulants [Z79.01] [Z79.01]  Atrial fibrillation (H) [I48.91]         Anticoagulation Episode Summary     INR check location     Preferred lab     Send INR reminders to [facility]    Comments       Anticoagulation Care Providers     Provider Role Specialty Phone number    Tej Tilley MD [facility] [phone]            See the Encounter Report to view Anticoagulation Flowsheet and Dosing Calendar (Go to Encounters tab in chart review, and find the Anticoagulation Therapy Visit)    Dosage adjustment made based on physician directed care plan.    Campos Carrera, RN

## 2017-11-29 ENCOUNTER — ANTICOAGULATION THERAPY VISIT (OUTPATIENT)
Dept: ANTICOAGULATION | Facility: OTHER | Age: 77
End: 2017-11-29
Payer: COMMERCIAL

## 2017-11-29 DIAGNOSIS — M51.379 DEGENERATION OF LUMBAR OR LUMBOSACRAL INTERVERTEBRAL DISC: ICD-10-CM

## 2017-11-29 DIAGNOSIS — I48.91 ATRIAL FIBRILLATION, UNSPECIFIED TYPE (H): ICD-10-CM

## 2017-11-29 DIAGNOSIS — Z79.01 LONG-TERM (CURRENT) USE OF ANTICOAGULANTS: ICD-10-CM

## 2017-11-29 LAB — INR POINT OF CARE: 2.4 (ref 0.86–1.14)

## 2017-11-29 PROCEDURE — 99207 ZZC NO CHARGE NURSE ONLY: CPT

## 2017-11-29 PROCEDURE — 85610 PROTHROMBIN TIME: CPT | Mod: QW

## 2017-11-29 PROCEDURE — 36416 COLLJ CAPILLARY BLOOD SPEC: CPT

## 2017-11-29 NOTE — MR AVS SNAPSHOT
Elvia Amaro   11/29/2017 9:15 AM   Anticoagulation Therapy Visit    Description:  77 year old female   Provider:  STACIE ANTI COAG   Department:  [facility]           INR as of 11/29/2017     Today's INR 2.4      Anticoagulation Summary as of 11/29/2017     INR goal 2.0-3.0   Today's INR 2.4   Full instructions 1.25 mg on Tue, Sat; 2.5 mg all other days   Next INR check 1/12/2018    Indications   Long-term (current) use of anticoagulants [Z79.01] [Z79.01]  Atrial fibrillation (H) [I48.91]         Your next Anticoagulation Clinic appointment(s)     Jan 12, 2018  9:15 AM CST   [facility] with STAC[facility]   [facility]    [address] [facility] [address]   [phone]              Contact Numbers     Clinic Number:         November 2017 Details    Sun Mon Tue Wed Thu Fri Sat        1               2               3               4                 5               6               7               8               9               10               11                 12               13               14               15               16               17               18                 19               20               21               22               23               24               25                 26               27               28               29      2.5 mg   See details      30      2.5 mg            Date Details   11/29 This INR check               How to take your warfarin dose     To take:  2.5 mg Take 1 of the 2.5 mg tablets.           December 2017 Details    Sun Mon Tue Wed Thu Fri Sat          1      2.5 mg         2      1.25 mg           3      2.5 mg         4      2.5 mg         5      1.25 mg         6      2.5 mg         7      2.5 mg         8      2.5 mg         9      1.25 mg           10      2.5 mg         11      2.5 mg         12      1.25 mg         13      2.5 mg         14      2.5 mg         15      2.5 mg         16      1.25  mg           17      2.5 mg         18      2.5 mg         19      1.25 mg         20      2.5 mg         21      2.5 mg         22      2.5 mg         23      1.25 mg           24      2.5 mg         25      2.5 mg         26      1.25 mg         27      2.5 mg         28      2.5 mg         29      2.5 mg         30      1.25 mg           31      2.5 mg                Date Details   No additional details            How to take your warfarin dose     To take:  1.25 mg Take 0.5 of a 2.5 mg tablet.    To take:  2.5 mg Take 1 of the 2.5 mg tablets.           January 2018 Details    Sun Mon Tue Wed Thu Fri Sat      1      2.5 mg         2      1.25 mg         3      2.5 mg         4      2.5 mg         5      2.5 mg         6      1.25 mg           7      2.5 mg         8      2.5 mg         9      1.25 mg         10      2.5 mg         11      2.5 mg         12            13                 14               15               16               17               18               19               20                 21               22               23               24               25               26               27                 28               29               30               31                   Date Details   No additional details    Date of next INR:  1/12/2018         How to take your warfarin dose     To take:  1.25 mg Take 0.5 of a 2.5 mg tablet.    To take:  2.5 mg Take 1 of the 2.5 mg tablets.

## 2017-11-29 NOTE — PROGRESS NOTES
ANTICOAGULATION FOLLOW-UP CLINIC VISIT    Patient Name:  Elvia Amaro  Date:  11/29/2017  Contact Type:  Face to Face    SUBJECTIVE:     Patient Findings     Positives No Problem Findings           OBJECTIVE    INR Protime   Date Value Ref Range Status   11/29/2017 2.4 (A) 0.86 - 1.14 Final       ASSESSMENT / PLAN  INR assessment THER    Recheck INR In: 6 WEEKS    INR Location Clinic      Anticoagulation Summary as of 11/29/2017     INR goal 2.0-3.0   Today's INR 2.4   Maintenance plan 1.25 mg (2.5 mg x 0.5) on Tue, Sat; 2.5 mg (2.5 mg x 1) all other days   Full instructions 1.25 mg on Tue, Sat; 2.5 mg all other days   Weekly total 15 mg   No change documented Campos Carrera RN   Plan last modified Campos Carrera RN (3/6/2017)   Next INR check 1/12/2018   Target end date     Indications   Long-term (current) use of anticoagulants [Z79.01] [Z79.01]  Atrial fibrillation (H) [I48.91]         Anticoagulation Episode Summary     INR check location     Preferred lab     Send INR reminders to [facility]    Comments       Anticoagulation Care Providers     Provider Role Specialty Phone number    Tej Tilley MD [facility] [phone]            See the Encounter Report to view Anticoagulation Flowsheet and Dosing Calendar (Go to Encounters tab in chart review, and find the Anticoagulation Therapy Visit)    Dosage adjustment made based on physician directed care plan.    Campos Carrera, RN

## 2017-11-30 ENCOUNTER — ONCOLOGY VISIT (OUTPATIENT)
Dept: ONCOLOGY | Facility: CLINIC | Age: 77
End: 2017-11-30
Attending: INTERNAL MEDICINE
Payer: MEDICARE

## 2017-11-30 VITALS
BODY MASS INDEX: 35.24 KG/M2 | TEMPERATURE: 97.9 F | DIASTOLIC BLOOD PRESSURE: 72 MMHG | RESPIRATION RATE: 16 BRPM | WEIGHT: 219.25 LBS | HEART RATE: 62 BPM | SYSTOLIC BLOOD PRESSURE: 124 MMHG | HEIGHT: 66 IN | OXYGEN SATURATION: 98 %

## 2017-11-30 DIAGNOSIS — D75.89 MACROCYTOSIS: ICD-10-CM

## 2017-11-30 DIAGNOSIS — D75.1 ERYTHROCYTOSIS: Primary | ICD-10-CM

## 2017-11-30 LAB
BASOPHILS # BLD AUTO: 0 10E9/L (ref 0–0.2)
BASOPHILS NFR BLD AUTO: 0.5 %
DIFFERENTIAL METHOD BLD: NORMAL
EOSINOPHIL # BLD AUTO: 0.3 10E9/L (ref 0–0.7)
EOSINOPHIL NFR BLD AUTO: 5.3 %
ERYTHROCYTE [DISTWIDTH] IN BLOOD BY AUTOMATED COUNT: 13.9 % (ref 10–15)
HCT VFR BLD AUTO: 43.4 % (ref 35–47)
HGB BLD-MCNC: 14.3 G/DL (ref 11.7–15.7)
IMM GRANULOCYTES # BLD: 0 10E9/L (ref 0–0.4)
IMM GRANULOCYTES NFR BLD: 0.3 %
LYMPHOCYTES # BLD AUTO: 0.8 10E9/L (ref 0.8–5.3)
LYMPHOCYTES NFR BLD AUTO: 13.3 %
MCH RBC QN AUTO: 30.7 PG (ref 26.5–33)
MCHC RBC AUTO-ENTMCNC: 32.9 G/DL (ref 31.5–36.5)
MCV RBC AUTO: 93 FL (ref 78–100)
MONOCYTES # BLD AUTO: 0.4 10E9/L (ref 0–1.3)
MONOCYTES NFR BLD AUTO: 6.4 %
NEUTROPHILS # BLD AUTO: 4.3 10E9/L (ref 1.6–8.3)
NEUTROPHILS NFR BLD AUTO: 74.2 %
NRBC # BLD AUTO: 0 10*3/UL
NRBC BLD AUTO-RTO: 0 /100
PLATELET # BLD AUTO: 165 10E9/L (ref 150–450)
RBC # BLD AUTO: 4.66 10E12/L (ref 3.8–5.2)
RETICS # AUTO: 84.3 10E9/L (ref 25–95)
RETICS/RBC NFR AUTO: 1.8 % (ref 0.5–2)
VIT B12 SERPL-MCNC: 551 PG/ML (ref 193–986)
WBC # BLD AUTO: 5.8 10E9/L (ref 4–11)

## 2017-11-30 PROCEDURE — 81403 MOPATH PROCEDURE LEVEL 4: CPT | Performed by: INTERNAL MEDICINE

## 2017-11-30 PROCEDURE — 99215 OFFICE O/P EST HI 40 MIN: CPT | Mod: ZP | Performed by: INTERNAL MEDICINE

## 2017-11-30 PROCEDURE — 85025 COMPLETE CBC W/AUTO DIFF WBC: CPT | Performed by: INTERNAL MEDICINE

## 2017-11-30 PROCEDURE — 36415 COLL VENOUS BLD VENIPUNCTURE: CPT

## 2017-11-30 PROCEDURE — 82746 ASSAY OF FOLIC ACID SERUM: CPT | Performed by: INTERNAL MEDICINE

## 2017-11-30 PROCEDURE — 81219 CALR GENE COM VARIANTS: CPT | Performed by: INTERNAL MEDICINE

## 2017-11-30 PROCEDURE — 82668 ASSAY OF ERYTHROPOIETIN: CPT | Performed by: INTERNAL MEDICINE

## 2017-11-30 PROCEDURE — 99212 OFFICE O/P EST SF 10 MIN: CPT | Mod: ZF

## 2017-11-30 PROCEDURE — 82607 VITAMIN B-12: CPT | Performed by: INTERNAL MEDICINE

## 2017-11-30 PROCEDURE — 85045 AUTOMATED RETICULOCYTE COUNT: CPT | Performed by: INTERNAL MEDICINE

## 2017-11-30 RX ORDER — MELOXICAM 7.5 MG/1
TABLET ORAL
Qty: 90 TABLET | Refills: 3 | OUTPATIENT
Start: 2017-11-30

## 2017-11-30 ASSESSMENT — PAIN SCALES - GENERAL: PAINLEVEL: NO PAIN (0)

## 2017-11-30 NOTE — NURSING NOTE
"Oncology Rooming Note    November 30, 2017 5:44 PM   Elvia Amaro is a 77 year old female who presents for:    Chief Complaint   Patient presents with     Oncology Clinic Visit     New patient Polycythemia     Initial Vitals: /81 (BP Location: Right arm, Patient Position: Chair, Cuff Size: Adult Large)  Pulse 62  Temp 97.9  F (36.6  C) (Oral)  Resp 16  Ht 1.664 m (5' 5.5\")  Wt 99.5 kg (219 lb 4 oz)  SpO2 98%  BMI 35.93 kg/m2 Estimated body mass index is 35.93 kg/(m^2) as calculated from the following:    Height as of this encounter: 1.664 m (5' 5.5\").    Weight as of this encounter: 99.5 kg (219 lb 4 oz). Body surface area is 2.14 meters squared.  No Pain (0) Comment: Data Unavailable   No LMP recorded. Patient is postmenopausal.  Allergies reviewed: Yes  Medications reviewed: Yes    Medications: Medication refills not needed today.  Pharmacy name entered into EPIC:    [id number] - [place], [place] - [address] [place] [address] PHARMACY MAIL DELIVERY - [facility] [address]    Clinical concerns: new patient Dr. Bowles was NOT notified.    6 minutes for nursing intake (face to face time)   Patient already received flu injection 10/05/2017 at [facility]      Lester Cardoso CMA            "

## 2017-11-30 NOTE — TELEPHONE ENCOUNTER
Requested Prescriptions   Pending Prescriptions Disp Refills     meloxicam (MOBIC) 7.5 MG tablet [Pharmacy Med Name: MELOXICAM 7.5 MG Tablet] 90 tablet 3     Sig: TAKE 1 TABLET EVERY DAY    NSAID Medications Failed    11/30/2017  7:06 AM       Failed - Patient is age 6-64 years       Failed - Normal CBC on file in past 12 months    Recent Labs   Lab Test  10/05/17   1415   WBC  5.3   RBC  5.12   HGB  15.6   HCT  51.6*   PLT  145*            Passed - Blood pressure under 140/90    BP Readings from Last 3 Encounters:   10/13/17 130/70   10/05/17 129/72   08/10/17 128/70                Passed - Normal ALT on file in past 12 months    Recent Labs   Lab Test  10/05/17   1415   ALT  28            Passed - Normal AST on file in past 12 months    Recent Labs   Lab Test  10/05/17   1415   AST  24            Passed - Recent or future visit with authorizing provider's specialty    Patient had office visit in the last year or has a visit in the next 30 days with authorizing provider.  See chart review.              Passed - No active pregnancy on record       Passed - Normal serum creatinine on file in past 12 months    Recent Labs   Lab Test  10/05/17   1415   CR  0.88            Passed - No positive pregnancy test in past 12 months

## 2017-11-30 NOTE — TELEPHONE ENCOUNTER
Prescription was sent 3/2/17 for #90 with 3 refills.  Pharmacy notified via E-Prescribe refusal.     Brandie Deutsch RN  [facility]

## 2017-11-30 NOTE — LETTER
11/30/2017       RE: Elvia Amaro  [address] [facility] [address]     Dear Colleague,    Thank you for referring your patient, Elvia Amaro, to the [facility]. Please see a copy of my visit note below.      HEMATOLOGY CLINIC VISIT    Elvia Amaro is a 77-year-old woman referred by Dr. Padilla for further evaluation of erythrocytosis and macrocytosis.  She has a history of breast cancer for which she completed all therapy more than 5 years ago.  Details of this can be found in the most recent Oncology Clinic visit note from 10/05/2017.       At the time of that visit, a routine CBC showed a hemoglobin of 15.6 with a hematocrit of 51.6%.  There had been a noticeable trend in the upward direction in both of those numbers over the preceding 18 months.  In addition, she was also noted to be macrocytic with an MCV of 101.  Her MCV had been consistently in the normal range prior to that.  The rest of her CBC parameters were all normal and stable.      Overall, she says she feels quite well.  She remains very active, getting out of the house regularly to attend [employer], run errands, and make her monthly trip to the [place].  She has been doing this for over 20 years.  That represents the only exposure she has to tobacco smoke and, again, she has not changed her activity pattern in that regard in a number of years.  She has no history of lung disease or any clinical symptoms to suggest the development of new cardiac or pulmonary issues.  She denies cough, chest pain, shortness of breath, dyspnea on exertion or any CHF type symptoms.  Other than issues with her thyroid for which her primary physician has been treating her over the last couple of years, she has had no new medical problems and is using no new medications.  Overall, she really feels quite well and a complete review of systems is entirely negative.       Her  has a wood burning stove in his shop, but she does not spend  any time there.  The furnace in their home is inspected annually and this was done again about 2 weeks ago.       PAST MEDICAL HISTORY:  Reviewed.       FAMILY HISTORY:  Reviewed.       SOCIAL HISTORY:  Reviewed.       MEDICATIONS:  Reviewed.       PHYSICAL EXAMINATION:  She appears to be quite healthy.  She is not pale or jaundiced.  There is no facial ruddiness or palmar erythema.  A more detailed exam was not performed today.      LABORATORY DATA:  CBC trends are discussed above.  She has had no labs checked since 10/05/2017.  Labs from today are pending at the time of this dictation.         ASSESSMENT AND PLAN:   1.  Mild erythrocytosis.    2.  Macrocytosis.      As outlined above, Elvia was found to have a slight trend up in her hemoglobin and hematocrit over the last 18 months with an isolated macrocytosis discovered at the time of her last CBC in early October.  She does not appear to have any obvious cause for secondary erythrocytosis.  We will proceed with a variety of laboratory studies to investigate for those things.  It is also possible that she could have very early stage of primary erythrocytosis related to underlying myeloproliferative neoplasm.  We will send the appropriate laboratory tests to look for that as well.  Overall, my suspicion for anything serious here is low.  Depending on the results of today's laboratory tests (I will call her with them when they are available), we may not need to see her back.  We did briefly discuss the possibility of a bone marrow biopsy should there be features to suggest an underlying chronic myeloproliferative disorder.  If so, we will see her back to discuss that further.      The macrocytosis may just be an isolated finding.  She does have thyroid issues for which she has been treated, but based on her labs, that all appears to have been quite stable over the last year or two.  Again, depending on the results of today's lab work, we may need to pursue this  further as well.       Overall, I tried to reassure her that my level of concern for anything serious as a cause of her trend toward erythrocytosis and the isolated macrocytosis from early October is quite low.  We will arrange for followup as indicated by the results of today's lab tests.       Total time spent today was 45 minutes, all of which was in counseling and coordination of care.       ADDENDUM:  Results of labs obtained on 11/30/17:  CBC parameters have all returned to normal / baseline -- WBC 5.8 (normal differential), Hgb 14.3, MCV 93, platelets 165,000.  Reticulocyte count is not elevated.  B12, folate, and erythropoietin levels are normal.  Next Gen Sequencing looking for CMPN mutations is still pending, although with resolution of the erythrocytosis and macrocytosis, this will most likely return normal as well.        Nahum Bowles MD  Associate Professor of Medicine  Division of Hematology, Oncology, and Transplantation  Director, [facility]

## 2017-11-30 NOTE — MR AVS SNAPSHOT
After Visit Summary   11/30/2017    Elvia Amaro    MRN: [id number]           Patient Information     Date Of Birth          1940        Visit Information        Provider Department      11/30/2017 6:00 PM Nahum Bowles MD [facility]        Today's Diagnoses     Erythrocytosis    -  1    Macrocytosis           Follow-ups after your visit        Your next 10 appointments already scheduled     Jan 12, 2018  9:15 AM CST   Anticoagulation Visit with  ANTI COAG   [facility]    [address] [facility] [address]   [phone]            Apr 05, 2018 12:00 PM CDT   [facility] Lab Draw with  [facility] DRAW   [facility] Draw ([facility])    [address] [place] [address] [phone]            Apr 05, 2018 12:30 PM CDT   (Arrive by 12:15 PM)   Return Visit with Elijah Padilla MD   [facility] ([facility])    [address] [place] [address] [phone]              Who to contact     If you have questions or need follow up information about today's clinic visit or your schedule please contact [facility] directly at [phone].  Normal or non-critical lab and imaging results will be communicated to you by MyChart, letter or phone within 4 business days after the clinic has received the results. If you do not hear from us within 7 days, please contact the clinic through MyChart or phone. If you have a critical or abnormal lab result, we will notify you by phone as soon as possible.  Submit refill requests through [employer] or call your pharmacy and they will forward the refill request to us. Please allow 3 business days for your refill to be completed.          Additional Information About Your Visit        [employer] Information     [employer] lets you send messages to your  "doctor, view your test results, renew your prescriptions, schedule appointments and more. To sign up, go to [email or web address] . Click on \"Log in\" on the left side of the screen, which will take you to the Welcome page. Then click on \"Sign up Now\" on the right side of the page.     You will be asked to enter the access code listed below, as well as some personal information. Please follow the directions to create your username and password.     Your access code is: [email or web address]  Expires: 2018  6:30 AM     Your access code will  in 90 days. If you need help or a new code, please call your [place] clinic or [phone].        Care EveryWhere ID     This is your Care EveryWhere ID. This could be used by other organizations to access your [place] medical records  [id number]        Your Vitals Were     Pulse Temperature Respirations Height Pulse Oximetry BMI (Body Mass Index)    62 97.9  F (36.6  C) (Oral) 16 1.664 m (5' 5.5\") 98% 35.93 kg/m2       Blood Pressure from Last 3 Encounters:   17 124/72   10/13/17 130/70   10/05/17 129/72    Weight from Last 3 Encounters:   17 99.5 kg (219 lb 4 oz)   10/13/17 101.4 kg (223 lb 8 oz)   10/05/17 101.4 kg (223 lb 8 oz)              We Performed the Following     *CBC with platelets differential     Erythropoietin     Folate     Next Generation Sequencing Oncology: Myeloproliferative Neoplasm Panel     Reticulocyte count     Vitamin B12        Primary Care Provider Office Phone # Fax #    Tej Tilley [phone]       [address] [facility] [address]        Equal Access to Services     [facility] : Shamar Valdivia, nori sin, enoch sapp. Harper Redwood [phone].    ATENCIÓN: Si habla español, tiene a good disposición servicios gratuitos de asistencia lingüística. Llame al [phone].    We comply with applicable federal civil rights laws and [place] " laws. We do not discriminate on the basis of race, color, national origin, age, disability, sex, sexual orientation, or gender identity.            Thank you!     Thank you for choosing [facility]  for your care. Our goal is always to provide you with excellent care. Hearing back from our patients is one way we can continue to improve our services. Please take a few minutes to complete the written survey that you may receive in the mail after your visit with us. Thank you!             Your Updated Medication List - Protect others around you: Learn how to safely use, store and throw away your medicines at www.disposemymeds.org.          This list is accurate as of: 11/30/17 11:59 PM.  Always use your most recent med list.                   Brand Name Dispense Instructions for use Diagnosis    ACE/ARB/ARNI NOT PRESCRIBED (INTENTIONAL)      1 each every 15 minutes ACE & ARB not prescribed due to Symptomatic hypotension not due to excessive diuresis    Atrial fibrillation, unspecified       amiodarone 200 MG tablet    PACERONE/CODARONE    90 tablet    TAKE 1 TABLET (200 MG) BY MOUTH DAILY    Atrial fibrillation, unspecified type (H)       CALCIUM + D PO      Take  by mouth.        carvedilol 12.5 MG tablet    COREG    180 tablet    TAKE 1 TABLET TWICE DAILY WITH MEALS    Cardiomyopathy (H)       furosemide 20 MG tablet    LASIX    90 tablet    TAKE 1 TABLET BY MOUTH EVER Y DAY    Localized edema, Congenital hypothyroidism without goiter, Acute systolic heart failure (H), Paroxysmal atrial fibrillation (H)       levothyroxine 100 MCG tablet    SYNTHROID/LEVOTHROID    90 tablet    Take 1 tablet (100 mcg) by mouth daily    Congenital hypothyroidism without goiter, Localized edema, Acute systolic heart failure (H), Paroxysmal atrial fibrillation (H)       meloxicam 7.5 MG tablet    MOBIC    90 tablet    TAKE 1 TABLET BY MOUTH EVER Y DAY    Degeneration of lumbar or lumbosacral intervertebral disc       *  order for DME     1 Device    Equipment being ordered: Elastic Sleeve  Prosthetics Fax [phone]    Malignant neoplasm of left female breast, unspecified site of breast       * order for DME     1 Device    Equipment being ordered: breast prosthesis and bras    S/P left mastectomy, Malignant neoplasm of left female breast, unspecified site of breast       VITAMIN D-1000 MAX ST PO      Take  by mouth.        warfarin 2.5 MG tablet    COUMADIN    80 tablet    TAKE 1/2 TABLET (1.25 MG) TUESDAY, SATURDAY AND 1 TABLET ALL OTHER DAYS OR AS DIRECTED BY THE COUMADIN CLINIC.    Paroxysmal atrial fibrillation (H), Long term current use of anticoagulant therapy       * Notice:  This list has 2 medication(s) that are the same as other medications prescribed for you. Read the directions carefully, and ask your doctor or other care provider to review them with you.

## 2017-12-01 LAB — FOLATE SERPL-MCNC: 21 NG/ML

## 2017-12-01 NOTE — PROGRESS NOTES
HEMATOLOGY CLINIC VISIT    Elvia Amaro is a 77-year-old woman referred by Dr. Padilla for further evaluation of erythrocytosis and macrocytosis.  She has a history of breast cancer for which she completed all therapy more than 5 years ago.  Details of this can be found in the most recent Oncology Clinic visit note from 10/05/2017.       At the time of that visit, a routine CBC showed a hemoglobin of 15.6 with a hematocrit of 51.6%.  There had been a noticeable trend in the upward direction in both of those numbers over the preceding 18 months.  In addition, she was also noted to be macrocytic with an MCV of 101.  Her MCV had been consistently in the normal range prior to that.  The rest of her CBC parameters were all normal and stable.      Overall, she says she feels quite well.  She remains very active, getting out of the house regularly to attend [employer], run errands, and make her monthly trip to the [place]  She has been doing this for over 20 years.  That represents the only exposure she has to tobacco smoke and, again, she has not changed her activity pattern in that regard in a number of years.  She has no history of lung disease or any clinical symptoms to suggest the development of new cardiac or pulmonary issues.  She denies cough, chest pain, shortness of breath, dyspnea on exertion or any CHF type symptoms.  Other than issues with her thyroid for which her primary physician has been treating her over the last couple of years, she has had no new medical problems and is using no new medications.  Overall, she really feels quite well and a complete review of systems is entirely negative.       Her  has a wood burning stove in his shop, but she does not spend any time there.  The furnace in their home is inspected annually and this was done again about 2 weeks ago.       PAST MEDICAL HISTORY:  Reviewed.       FAMILY HISTORY:  Reviewed.       SOCIAL HISTORY:  Reviewed.       MEDICATIONS:  Reviewed.        PHYSICAL EXAMINATION:  She appears to be quite healthy.  She is not pale or jaundiced.  There is no facial ruddiness or palmar erythema.  A more detailed exam was not performed today.      LABORATORY DATA:  CBC trends are discussed above.  She has had no labs checked since 10/05/2017.  Labs from today are pending at the time of this dictation.         ASSESSMENT AND PLAN:   1.  Mild erythrocytosis.    2.  Macrocytosis.      As outlined above, Elvia was found to have a slight trend up in her hemoglobin and hematocrit over the last 18 months with an isolated macrocytosis discovered at the time of her last CBC in early October.  She does not appear to have any obvious cause for secondary erythrocytosis.  We will proceed with a variety of laboratory studies to investigate for those things.  It is also possible that she could have very early stage of primary erythrocytosis related to underlying myeloproliferative neoplasm.  We will send the appropriate laboratory tests to look for that as well.  Overall, my suspicion for anything serious here is low.  Depending on the results of today's laboratory tests (I will call her with them when they are available), we may not need to see her back.  We did briefly discuss the possibility of a bone marrow biopsy should there be features to suggest an underlying chronic myeloproliferative disorder.  If so, we will see her back to discuss that further.      The macrocytosis may just be an isolated finding.  She does have thyroid issues for which she has been treated, but based on her labs, that all appears to have been quite stable over the last year or two.  Again, depending on the results of today's lab work, we may need to pursue this further as well.       Overall, I tried to reassure her that my level of concern for anything serious as a cause of her trend toward erythrocytosis and the isolated macrocytosis from early October is quite low.  We will arrange for followup  as indicated by the results of today's lab tests.       Total time spent today was 45 minutes, all of which was in counseling and coordination of care.       ADDENDUM:  Results of labs obtained on 11/30/17:  CBC parameters have all returned to normal / baseline -- WBC 5.8 (normal differential), Hgb 14.3, MCV 93, platelets 165,000.  Reticulocyte count is not elevated.  B12, folate, and erythropoietin levels are normal.  Next Gen Sequencing looking for CMPN mutations is still pending, although with resolution of the erythrocytosis and macrocytosis, this will most likely return normal as well.        Nahum Bowles MD  Associate Professor of Medicine  Division of Hematology, Oncology, and Transplantation  Director, [facility]

## 2017-12-02 LAB — EPO SERPL-ACNC: 15 MU/ML (ref 4–27)

## 2017-12-13 ENCOUNTER — CARE COORDINATION (OUTPATIENT)
Dept: ONCOLOGY | Facility: CLINIC | Age: 77
End: 2017-12-13

## 2017-12-13 LAB — COPATH REPORT: NORMAL

## 2017-12-13 NOTE — PROGRESS NOTES
" Message  Received: Today       Nahum Bowles MD Suzukida, Angela ALFONSO, DEBORAH Miller,     Could you please call Elvia and let her know that her labs all returned normal?  I saw her a couple weeks ago (referred by Dr. Padilla) for mild erythrocytosis and macrocytosis.  There was some concern for a chronic myeloproliferative disorder, but upon recheck of her CBC, everything had returned to normal.  The next gen sequencing for JAK2, etc is still pending, but I would be very surprised if it returned abnormal.  Looks like she just had a mild, transient, \"blip\" of erythrocytosis and macrocytosis.  No need for any further work-up of follow up with me.       Thanks.     Nahum.       The patient was called and Dr. Bowles's message regarding her lab test was reviewed.  She was told that her blood cell counts were back in normal range with the tests that Dr. Bowles ordered.  She was told that the JAK2 test is not back yet, but Dr. Bowles expects that it will be normal and we will call if there is any problems with the result.  She was told that Dr. Bowles feels the elevated blood counts were just a short term thing, and he does not feel that she needs any further testing or follow-up with hematology unless there are any new concerns that develop in the future.  The patient voiced good understanding of these results and this plan.    "

## 2018-01-12 ENCOUNTER — ANTICOAGULATION THERAPY VISIT (OUTPATIENT)
Dept: ANTICOAGULATION | Facility: OTHER | Age: 78
End: 2018-01-12
Payer: COMMERCIAL

## 2018-01-12 DIAGNOSIS — I48.91 ATRIAL FIBRILLATION, UNSPECIFIED TYPE (H): ICD-10-CM

## 2018-01-12 DIAGNOSIS — Z79.01 LONG-TERM (CURRENT) USE OF ANTICOAGULANTS: ICD-10-CM

## 2018-01-12 LAB — INR POINT OF CARE: 2.7 (ref 0.86–1.14)

## 2018-01-12 PROCEDURE — 85610 PROTHROMBIN TIME: CPT | Mod: QW

## 2018-01-12 PROCEDURE — 36416 COLLJ CAPILLARY BLOOD SPEC: CPT

## 2018-01-12 PROCEDURE — 99207 ZZC NO CHARGE NURSE ONLY: CPT

## 2018-01-12 NOTE — PROGRESS NOTES
ANTICOAGULATION FOLLOW-UP CLINIC VISIT    Patient Name:  Elvia Amaro  Date:  1/12/2018  Contact Type:  Face to Face    SUBJECTIVE:     Patient Findings     Positives No Problem Findings           OBJECTIVE    INR Protime   Date Value Ref Range Status   01/12/2018 2.7 (A) 0.86 - 1.14 Final       ASSESSMENT / PLAN  INR assessment THER    Recheck INR In: 6 WEEKS    INR Location Clinic      Anticoagulation Summary as of 1/12/2018     INR goal 2.0-3.0   Today's INR 2.7   Maintenance plan 1.25 mg (2.5 mg x 0.5) on Tue, Sat; 2.5 mg (2.5 mg x 1) all other days   Full instructions 1.25 mg on Tue, Sat; 2.5 mg all other days   Weekly total 15 mg   No change documented Campos Carrera RN   Plan last modified Campos Carrera RN (3/6/2017)   Next INR check    Target end date     Indications   Long-term (current) use of anticoagulants [Z79.01] [Z79.01]  Atrial fibrillation (H) [I48.91]         Anticoagulation Episode Summary     INR check location     Preferred lab     Send INR reminders to [facility]    Comments 2.5 mg tabs, AM dose      Anticoagulation Care Providers     Provider Role Specialty Phone number    Tej Tilley MD [facility] [phone]            See the Encounter Report to view Anticoagulation Flowsheet and Dosing Calendar (Go to Encounters tab in chart review, and find the Anticoagulation Therapy Visit)    Dosage adjustment made based on physician directed care plan.    Campos Carrera RN

## 2018-01-12 NOTE — MR AVS SNAPSHOT
Elvianoel Amaro   1/12/2018 9:15 AM   Anticoagulation Therapy Visit    Description:  77 year old female   Provider:  STACIE ANTI COAG   Department:  [facility]           INR as of 1/12/2018     Today's INR 2.7      Anticoagulation Summary as of 1/12/2018     INR goal 2.0-3.0   Today's INR 2.7   Full instructions 1.25 mg on Tue, Sat; 2.5 mg all other days   Next INR check 2/23/2018    Indications   Long-term (current) use of anticoagulants [Z79.01] [Z79.01]  Atrial fibrillation (H) [I48.91]         Your next Anticoagulation Clinic appointment(s)     Feb 23, 2018  9:15 AM CST   Anticoagulation Visit with STACIE ANTI PAZ   [facility]    [address] [facility] [address]   [phone]              Contact Numbers     Clinic Number:         January 2018 Details    Sun Mon Tue Wed Thu Fri Sat      1               2               3               4               5               6                 7               8               9               10               11               12      2.5 mg   See details      13      1.25 mg           14      2.5 mg         15      2.5 mg         16      1.25 mg         17      2.5 mg         18      2.5 mg         19      2.5 mg         20      1.25 mg           21      2.5 mg         22      2.5 mg         23      1.25 mg         24      2.5 mg         25      2.5 mg         26      2.5 mg         27      1.25 mg           28      2.5 mg         29      2.5 mg         30      1.25 mg         31      2.5 mg             Date Details   01/12 This INR check               How to take your warfarin dose     To take:  1.25 mg Take 0.5 of a 2.5 mg tablet.    To take:  2.5 mg Take 1 of the 2.5 mg tablets.           February 2018 Details    Sun Mon Tue Wed Thu Fri Sat         1      2.5 mg         2      2.5 mg         3      1.25 mg           4      2.5 mg         5      2.5 mg         6      1.25 mg         7      2.5 mg         8      2.5 mg         9       2.5 mg         10      1.25 mg           11      2.5 mg         12      2.5 mg         13      1.25 mg         14      2.5 mg         15      2.5 mg         16      2.5 mg         17      1.25 mg           18      2.5 mg         19      2.5 mg         20      1.25 mg         21      2.5 mg         22      2.5 mg         23            24                 25               26               27               28                   Date Details   No additional details    Date of next INR:  2/23/2018         How to take your warfarin dose     To take:  1.25 mg Take 0.5 of a 2.5 mg tablet.    To take:  2.5 mg Take 1 of the 2.5 mg tablets.

## 2018-02-05 DIAGNOSIS — Z79.01 LONG TERM CURRENT USE OF ANTICOAGULANT THERAPY: ICD-10-CM

## 2018-02-05 DIAGNOSIS — I48.0 PAROXYSMAL ATRIAL FIBRILLATION (H): ICD-10-CM

## 2018-02-06 RX ORDER — WARFARIN SODIUM 2.5 MG/1
TABLET ORAL
Qty: 80 TABLET | Refills: 1 | Status: SHIPPED | OUTPATIENT
Start: 2018-02-06 | End: 2018-06-26

## 2018-02-06 NOTE — TELEPHONE ENCOUNTER
"Requested Prescriptions   Pending Prescriptions Disp Refills     warfarin (COUMADIN) 2.5 MG tablet [Pharmacy Med Name: WARFARIN SODIUM 2.5 MG Tablet] 80 tablet 1     Sig: TAKE 1/2 TABLET (1.25 MG) TUESDAY, SATURDAY AND 1 TABLET ALL OTHER DAYS OR AS DIRECTED BY THE COUMADIN CLINIC    Vitamin K Antagonists Failed    2/5/2018  6:17 PM       Failed - INR is within goal in the past 6 weeks    Confirm INR is within goal in the past 6 weeks.     Recent Labs   Lab Test 01/12/18   INR  2.7*                      Passed - Recent or future visit with authorizing provider's specialty    Patient had office visit in the last year or has a visit in the next 30 days with authorizing provider.  See \"Patient Info\" tab in inbasket, or \"Choose Columns\" in Meds & Orders section of the refill encounter.            Passed - Patient is 18 years of age or older       Passed - Patient is not pregnant       Passed - No positive pregnancy on file in past 12 months          Last Written Prescription Date:  9/19/17  Last Fill Quantity: 80,  # refills: 1   Last Office Visit with G, P or [facility] prescribing provider:  10/13/17   Future Office Visit:       "

## 2018-02-23 ENCOUNTER — ANTICOAGULATION THERAPY VISIT (OUTPATIENT)
Dept: ANTICOAGULATION | Facility: OTHER | Age: 78
End: 2018-02-23
Payer: COMMERCIAL

## 2018-02-23 DIAGNOSIS — Z79.01 LONG-TERM (CURRENT) USE OF ANTICOAGULANTS: ICD-10-CM

## 2018-02-23 DIAGNOSIS — I48.91 ATRIAL FIBRILLATION, UNSPECIFIED TYPE (H): ICD-10-CM

## 2018-02-23 LAB — INR POINT OF CARE: 2.6 (ref 0.86–1.14)

## 2018-02-23 PROCEDURE — 85610 PROTHROMBIN TIME: CPT | Mod: QW

## 2018-02-23 PROCEDURE — 99207 ZZC NO CHARGE NURSE ONLY: CPT

## 2018-02-23 PROCEDURE — 36416 COLLJ CAPILLARY BLOOD SPEC: CPT

## 2018-02-23 NOTE — MR AVS SNAPSHOT
Elvia Amaro   2/23/2018 9:15 AM   Anticoagulation Therapy Visit    Description:  77 year old female   Provider:  STACIE ANTI COAG   Department:  [facility]           INR as of 2/23/2018     Today's INR 2.6      Anticoagulation Summary as of 2/23/2018     INR goal 2.0-3.0   Today's INR 2.6   Full instructions 1.25 mg on Tue, Sat; 2.5 mg all other days   Next INR check 4/6/2018    Indications   Long-term (current) use of anticoagulants [Z79.01] [Z79.01]  Atrial fibrillation (H) [I48.91]         Your next Anticoagulation Clinic appointment(s)     Apr 06, 2018  9:15 AM CDT   [facility]    [address] [facility] [address]   [phone]              Contact Numbers     Clinic Number:         February 2018 Details    Sun Mon Tue Wed Thu Fri Sat         1               2               3                 4               5               6               7               8               9               10                 11               12               13               14               15               16               17                 18               19               20               21               22               23      2.5 mg   See details      24      1.25 mg           25      2.5 mg         26      2.5 mg         27      1.25 mg         28      2.5 mg             Date Details   02/23 This INR check               How to take your warfarin dose     To take:  1.25 mg Take 0.5 of a 2.5 mg tablet.    To take:  2.5 mg Take 1 of the 2.5 mg tablets.           March 2018 Details    Sun Mon Tue Wed Thu Fri Sat         1      2.5 mg         2      2.5 mg         3      1.25 mg           4      2.5 mg         5      2.5 mg         6      1.25 mg         7      2.5 mg         8      2.5 mg         9      2.5 mg         10      1.25 mg           11      2.5 mg         12      2.5 mg         13      1.25 mg         14      2.5 mg          15      2.5 mg         16      2.5 mg         17      1.25 mg           18      2.5 mg         19      2.5 mg         20      1.25 mg         21      2.5 mg         22      2.5 mg         23      2.5 mg         24      1.25 mg           25      2.5 mg         26      2.5 mg         27      1.25 mg         28      2.5 mg         29      2.5 mg         30      2.5 mg         31      1.25 mg          Date Details   No additional details            How to take your warfarin dose     To take:  1.25 mg Take 0.5 of a 2.5 mg tablet.    To take:  2.5 mg Take 1 of the 2.5 mg tablets.           April 2018 Details    Sun Mon Tue Wed Thu Fri Sat     1      2.5 mg         2      2.5 mg         3      1.25 mg         4      2.5 mg         5      2.5 mg         6            7                 8               9               10               11               12               13               14                 15               16               17               18               19               20               21                 22               23               24               25               26               27               28                 29               30                     Date Details   No additional details    Date of next INR:  4/6/2018         How to take your warfarin dose     To take:  1.25 mg Take 0.5 of a 2.5 mg tablet.    To take:  2.5 mg Take 1 of the 2.5 mg tablets.

## 2018-02-23 NOTE — PROGRESS NOTES
ANTICOAGULATION FOLLOW-UP CLINIC VISIT    Patient Name:  Elvia Amaro  Date:  2/23/2018  Contact Type:  Face to Face    SUBJECTIVE:     Patient Findings     Positives No Problem Findings           OBJECTIVE    INR Protime   Date Value Ref Range Status   02/23/2018 2.6 (A) 0.86 - 1.14 Final       ASSESSMENT / PLAN  INR assessment THER    Recheck INR In: 6 WEEKS    INR Location Clinic      Anticoagulation Summary as of 2/23/2018     INR goal 2.0-3.0   Today's INR 2.6   Maintenance plan 1.25 mg (2.5 mg x 0.5) on Tue, Sat; 2.5 mg (2.5 mg x 1) all other days   Full instructions 1.25 mg on Tue, Sat; 2.5 mg all other days   Weekly total 15 mg   No change documented Campos Carrera RN   Plan last modified Campos Carrera RN (3/6/2017)   Next INR check 4/6/2018   Target end date     Indications   Long-term (current) use of anticoagulants [Z79.01] [Z79.01]  Atrial fibrillation (H) [I48.91]         Anticoagulation Episode Summary     INR check location     Preferred lab     Send INR reminders to [facility] PO[facility]    Comments 2.5 mg tabs, PM dose      Anticoagulation Care Providers     Provider Role Specialty Phone number    Tej Tilley MD [facility] [phone]            See the Encounter Report to view Anticoagulation Flowsheet and Dosing Calendar (Go to Encounters tab in chart review, and find the Anticoagulation Therapy Visit)    Dosage adjustment made based on physician directed care plan.    Campos Carrera, RN

## 2018-03-05 DIAGNOSIS — I50.21 ACUTE SYSTOLIC HEART FAILURE (H): ICD-10-CM

## 2018-03-05 DIAGNOSIS — I48.0 PAROXYSMAL ATRIAL FIBRILLATION (H): ICD-10-CM

## 2018-03-05 DIAGNOSIS — E03.1 CONGENITAL HYPOTHYROIDISM WITHOUT GOITER: ICD-10-CM

## 2018-03-05 DIAGNOSIS — R60.0 LOCALIZED EDEMA: ICD-10-CM

## 2018-03-05 DIAGNOSIS — I48.91 ATRIAL FIBRILLATION, UNSPECIFIED TYPE (H): ICD-10-CM

## 2018-03-05 NOTE — TELEPHONE ENCOUNTER
"Requested Prescriptions   Pending Prescriptions Disp Refills     furosemide (LASIX) 20 MG tablet [Pharmacy Med Name: FUROSEMIDE 20 MG Tablet] 90 tablet 3    Last Written Prescription Date:  4-7-2017  Last Fill Quantity: 90,  # refills: 3   Last office visit: 10/13/2017 with prescribing provider:  10-   Future Office Visit:     Sig: TAKE 1 TABLET EVERY DAY    Diuretics (Including Combos) Protocol Passed    3/5/2018 12:27 PM       Passed - Blood pressure under 140/90 in past 12 months    BP Readings from Last 3 Encounters:   11/30/17 124/72   10/13/17 130/70   10/05/17 129/72                Passed - Recent (12 mo) or future (30 days) visit within the authorizing provider's specialty    Patient had office visit in the last year or has a visit in the next 30 days with authorizing provider.  See \"Patient Info\" tab in inbasket, or \"Choose Columns\" in Meds & Orders section of the refill encounter.            Passed - Patient is age 18 or older       Passed - No active pregancy on record       Passed - Normal serum creatinine on file in past 12 months    Recent Labs   Lab Test  10/05/17   1415   CR  0.88             Passed - Normal serum potassium on file in past 12 months    Recent Labs   Lab Test  10/05/17   1415   POTASSIUM  4.3                   Passed - Normal serum sodium on file in past 12 months    Recent Labs   Lab Test  10/05/17   1415   NA  136             Passed - No positive pregnancy test in past 12 months          "

## 2018-03-06 RX ORDER — FUROSEMIDE 20 MG
TABLET ORAL
Qty: 90 TABLET | Refills: 1 | Status: SHIPPED | OUTPATIENT
Start: 2018-03-06 | End: 2018-07-23

## 2018-03-06 NOTE — TELEPHONE ENCOUNTER
Prescription approved per [facility] Refill Protocol.  Manuela Hanley RN

## 2018-03-08 DIAGNOSIS — Z79.899 LONG TERM CURRENT USE OF AMIODARONE: Primary | ICD-10-CM

## 2018-03-08 RX ORDER — AMIODARONE HYDROCHLORIDE 200 MG/1
TABLET ORAL
Qty: 90 TABLET | Refills: 3 | Status: SHIPPED | OUTPATIENT
Start: 2018-03-08 | End: 2018-04-12

## 2018-04-04 NOTE — PROGRESS NOTES
Ms. 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 The presentation was consistent with inflammatory breast cancer. She had a 4-cm mass in the upper outer quadrant of the left breast and some skin erythema as well as axillary lymphadenopathy on the left. Skin biopsy was negative for an infiltrating tumor cells. For neoadjuvant chemotherapy she received 4 cycles of dose-dense AC from October through December 2010 followed by dose-dense Taxol from December 2010 through February 2011. The tumor responded to the therapy with reduction in size of the breast mass and she had a near complete response, which placed her in the favorable RCB-1 category.   She underwent a left mastectomy on 03/21/2011 and had a left axillary lymph node dissection along with a TAHBSO due to an abnormal Pap smear. Pathology of the breast showed a minutMs. Amaro is a 75-year-old woman with a history of triple-negative infiltrating ductal carcinoma of the left breast diagnosed in 10/2010. The presentation was consistent with inflammatory breast cancer. She had a 4-cm mass in the upper outer quadrant of the left breast and some skin erythema as well as axillary lymphadenopathy on the left. Skin biopsy was negative for an infiltrating tumor cells. For neoadjuvant chemotherapy she received 4 cycles of dose-dense AC from October through December 2010 followed by dose-dense Taxol from December 2010 through February 2011. The tumor responded to the therapy with reduction in size of the breast mass and she had a near complete response, which placed her in the favorable RCB-1 category.   She underwent a left mastectomy on 03/21/2011 and had a left axillary lymph node dissection along with a TAHBSO due to an abnormal Pap smear. Pathology of the breast showed a minute focus of 0.2 cm of carcinoma which was grade 3 invasive ductal carcinoma, ER and VT negative, HER2  negative by immunohistochemistry and equivocal by FISH. The margins were negative. All 21 lymph nodes were negative. The uterus pathology showed an endometrial polyp and the ovaries and the fallopian tubes were normal.   Ms. Amaro went on to complete radiation therapy to the left chest wall, which she completed in July 2011. e focus of 0.2 cm of carcinoma which was grade 3 invasive ductal carcinoma, ER and AZ negative, HER2 negative by immunohistochemistry and equivocal by FISH. The margins were negative. All 21 lymph nodes were negative. The uterus pathology showed an endometrial polyp and the ovaries and the fallopian tubes were normal.   FOLLOWUP NOTE      INTERVAL HISTORY:  Elvia returns to clinic for routine followup following her diagnosis of a left breast, triple-negative breast cancer in 2010.  She is about 7-1/2 years from her initial diagnosis.  She has no pain, no fatigue, no depression, no anxiety.  Squamous cell carcinoma on her right calf and basal cell carcinoma left leg anteriorly are without any evidence of recurrence.      REVIEW OF SYSTEMS:  She denies fevers or chills, cough, chest pain, shortness breath, nausea, vomiting, constipation, diarrhea, bone pain, back pain or headache.  The remainder of a 10-point review of systems is negative.      She was diagnosed with atrial fibrillation.  She continues on warfarin and has had a therapeutic INR.  She has been in sinus rhythm by her report.      PHYSICAL EXAMINATION:   VITAL SIGNS:  Blood pressure 115/52, temperature 97.6, pulse 63, respirations 16, O2 sat 96% on room air, height 1.66 meters and weight 95.3 kg.   GENERAL:  Elvia appears generally well.  She has no alopecia.   HEENT:  Oropharynx is without lesions.   LYMPH:  There is no palpable cervical, supraclavicular, subclavicular or axillary lymphadenopathy.   BREASTS:  Examination of the right breast reveals no masses.  Examination of the left breast reveals a well-healed mastectomy  incision without erythema or masses.  There are no masses in the anterior chest wall bilaterally.  There are some telangiectasias over the lateral aspect of the incision going into the axilla.   LUNGS:  Clear to percussion and auscultation.   HEART:  There was a regular rate and rhythm, S1, S2.   ABDOMEN:  Soft, nontender, consistent with increased body mass index.   EXTREMITIES:  Without edema.   SKIN:  Examination of the skin on the left shin and the right calf reveals no evidence of recurrence of the squamous cell carcinomas.      LABORATORY DATA:  The CBC and CMP were within normal limits.  She does have an elevated TSH of 7.1.      ASSESSMENT AND PLAN:  Elvia Amaro is a 77-year-old woman with:    1.  History of inflammatory triple-negative invasive ductal carcinoma of the left breast, stage IIIB, clinical stage [id number].  She was treated with neoadjuvant chemotherapy with 4 cycles of dose-dense AC followed by 4 cycles of dose-dense Taxol.  She went on to have a left mastectomy with clear margins.  There was a residual 0.2 cm focus of carcinoma present which was grade 3.  Left axillary node dissection revealed no lymph node involvement of 21 lymph nodes examined.  The pathologic stage was [id number].  The tumor was RCB1 with a 10% risk of recurrence at 5 years.  She is now out at year 8.  She completed radiation therapy and has had no evidence of recurrence at the 8-year divina.  Her mammogram in 10/2017 showed benign findings.   2.  Discussion of diet and exercise.  She is eating a low-fat diet.  She has been exercising at a local gym 1 hour 3 times a week, and I commended her on her efforts.   3.  Bone health.  She takes calcium and vitamin D.  She has had normal bone density in the past with the last DEXA being on 03/26/2015.  I think repeating the DEXA at a 5-year interval would be reasonable since the DEXA was normal.   4.  Basal cell carcinoma on the left anterior shin and squamous carcinoma of the right  calf.  Both were excised and show no evidence of recurrence.   5.  Atrial fibrillation is being managed by Dr. Kerr.  She is on warfarin as well as amiodarone, Coreg and meloxicam.   6.  Followup.  We will see Elvia in followup in our clinic with KELY in 6 months with a mammogram and with me in 1 year for a breast exam.      Thank you for allowing us to continue to participate in Elvia Amaro's care.  I did share with her that the risk of recurrence is quite low at this time.  All of her questions were answered.      Elijah Padilla MD       [facility]     I spent 20 minutes with the patient more than 50% of which was in counseling and coordination of care.

## 2018-04-05 ENCOUNTER — ONCOLOGY VISIT (OUTPATIENT)
Dept: ONCOLOGY | Facility: CLINIC | Age: 78
End: 2018-04-05
Attending: INTERNAL MEDICINE
Payer: MEDICARE

## 2018-04-05 VITALS
DIASTOLIC BLOOD PRESSURE: 52 MMHG | RESPIRATION RATE: 18 BRPM | OXYGEN SATURATION: 96 % | WEIGHT: 210 LBS | TEMPERATURE: 97.6 F | HEIGHT: 66 IN | BODY MASS INDEX: 33.75 KG/M2 | HEART RATE: 63 BPM | SYSTOLIC BLOOD PRESSURE: 115 MMHG

## 2018-04-05 DIAGNOSIS — Z79.899 LONG TERM CURRENT USE OF AMIODARONE: ICD-10-CM

## 2018-04-05 DIAGNOSIS — C50.412 MALIGNANT NEOPLASM OF UPPER-OUTER QUADRANT OF LEFT BREAST IN FEMALE, ESTROGEN RECEPTOR NEGATIVE (H): Primary | ICD-10-CM

## 2018-04-05 DIAGNOSIS — C50.412 BREAST CANCER OF UPPER-OUTER QUADRANT OF LEFT FEMALE BREAST (H): ICD-10-CM

## 2018-04-05 DIAGNOSIS — Z17.1 MALIGNANT NEOPLASM OF UPPER-OUTER QUADRANT OF LEFT BREAST IN FEMALE, ESTROGEN RECEPTOR NEGATIVE (H): Primary | ICD-10-CM

## 2018-04-05 LAB
ALBUMIN SERPL-MCNC: 3.5 G/DL (ref 3.4–5)
ALP SERPL-CCNC: 108 U/L (ref 40–150)
ALT SERPL W P-5'-P-CCNC: 30 U/L (ref 0–50)
ANION GAP SERPL CALCULATED.3IONS-SCNC: 5 MMOL/L (ref 3–14)
AST SERPL W P-5'-P-CCNC: 23 U/L (ref 0–45)
BASOPHILS # BLD AUTO: 0 10E9/L (ref 0–0.2)
BASOPHILS NFR BLD AUTO: 0.5 %
BILIRUB DIRECT SERPL-MCNC: 0.2 MG/DL (ref 0–0.2)
BILIRUB SERPL-MCNC: 0.5 MG/DL (ref 0.2–1.3)
BUN SERPL-MCNC: 15 MG/DL (ref 7–30)
CALCIUM SERPL-MCNC: 9.5 MG/DL (ref 8.5–10.1)
CHLORIDE SERPL-SCNC: 105 MMOL/L (ref 94–109)
CO2 SERPL-SCNC: 29 MMOL/L (ref 20–32)
CREAT SERPL-MCNC: 0.88 MG/DL (ref 0.52–1.04)
DIFFERENTIAL METHOD BLD: NORMAL
EOSINOPHIL # BLD AUTO: 0.1 10E9/L (ref 0–0.7)
EOSINOPHIL NFR BLD AUTO: 1.6 %
ERYTHROCYTE [DISTWIDTH] IN BLOOD BY AUTOMATED COUNT: 13.9 % (ref 10–15)
GFR SERPL CREATININE-BSD FRML MDRD: 62 ML/MIN/1.7M2
GLUCOSE SERPL-MCNC: 112 MG/DL (ref 70–99)
HCT VFR BLD AUTO: 44.6 % (ref 35–47)
HGB BLD-MCNC: 14.8 G/DL (ref 11.7–15.7)
IMM GRANULOCYTES # BLD: 0 10E9/L (ref 0–0.4)
IMM GRANULOCYTES NFR BLD: 0.3 %
LYMPHOCYTES # BLD AUTO: 0.8 10E9/L (ref 0.8–5.3)
LYMPHOCYTES NFR BLD AUTO: 13.4 %
MCH RBC QN AUTO: 30.7 PG (ref 26.5–33)
MCHC RBC AUTO-ENTMCNC: 33.2 G/DL (ref 31.5–36.5)
MCV RBC AUTO: 93 FL (ref 78–100)
MONOCYTES # BLD AUTO: 0.4 10E9/L (ref 0–1.3)
MONOCYTES NFR BLD AUTO: 6 %
NEUTROPHILS # BLD AUTO: 4.9 10E9/L (ref 1.6–8.3)
NEUTROPHILS NFR BLD AUTO: 78.2 %
NRBC # BLD AUTO: 0 10*3/UL
NRBC BLD AUTO-RTO: 0 /100
PLATELET # BLD AUTO: 167 10E9/L (ref 150–450)
POTASSIUM SERPL-SCNC: 3.9 MMOL/L (ref 3.4–5.3)
PROT SERPL-MCNC: 7 G/DL (ref 6.8–8.8)
RBC # BLD AUTO: 4.82 10E12/L (ref 3.8–5.2)
SODIUM SERPL-SCNC: 139 MMOL/L (ref 133–144)
TSH SERPL DL<=0.005 MIU/L-ACNC: 7.1 MU/L (ref 0.4–4)
WBC # BLD AUTO: 6.2 10E9/L (ref 4–11)

## 2018-04-05 PROCEDURE — 82248 BILIRUBIN DIRECT: CPT | Performed by: INTERNAL MEDICINE

## 2018-04-05 PROCEDURE — 85025 COMPLETE CBC W/AUTO DIFF WBC: CPT | Performed by: INTERNAL MEDICINE

## 2018-04-05 PROCEDURE — G0463 HOSPITAL OUTPT CLINIC VISIT: HCPCS | Mod: ZF

## 2018-04-05 PROCEDURE — 84443 ASSAY THYROID STIM HORMONE: CPT | Performed by: INTERNAL MEDICINE

## 2018-04-05 PROCEDURE — 99213 OFFICE O/P EST LOW 20 MIN: CPT | Mod: ZP | Performed by: INTERNAL MEDICINE

## 2018-04-05 PROCEDURE — 36415 COLL VENOUS BLD VENIPUNCTURE: CPT

## 2018-04-05 PROCEDURE — 80053 COMPREHEN METABOLIC PANEL: CPT | Performed by: INTERNAL MEDICINE

## 2018-04-05 ASSESSMENT — PAIN SCALES - GENERAL: PAINLEVEL: NO PAIN (0)

## 2018-04-05 NOTE — NURSING NOTE
"Oncology Rooming Note    April 5, 2018 12:52 PM   Elvia Amaro is a 77 year old female who presents for:    Chief Complaint   Patient presents with     Oncology Clinic Visit     Return: Breast ca      Initial Vitals: /52  Pulse 63  Temp 97.6  F (36.4  C) (Oral)  Resp 18  Ht 1.664 m (5' 5.51\")  Wt 95.3 kg (210 lb)  SpO2 96%  BMI 34.4 kg/m2 Estimated body mass index is 34.4 kg/(m^2) as calculated from the following:    Height as of this encounter: 1.664 m (5' 5.51\").    Weight as of this encounter: 95.3 kg (210 lb). Body surface area is 2.1 meters squared.  No Pain (0) Comment: Data Unavailable   No LMP recorded. Patient is postmenopausal.  Allergies reviewed: YES  Medications reviewed: YES    Medications: Medication refills not needed today.  Pharmacy name entered into EPIC:    [id number] - [place], [place] - [address] [place] [address] PHARMACY MAIL DELIVERY - [facility] [address]    Clinical concerns: no new concerns.  Pt received flu shot elsewhere. See Immunizations     6 minutes for nursing intake (face to face time)     Marge Simon CMA                "

## 2018-04-05 NOTE — LETTER
4/5/2018       RE: Elvia Amaro  [address] [address] [place] [id number]     Dear Colleague,    Thank you for referring your patient, Elvia Amaro, to the [facility]. Please see a copy of my visit note below.    Ms. 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 The presentation was consistent with inflammatory breast cancer. She had a 4-cm mass in the upper outer quadrant of the left breast and some skin erythema as well as axillary lymphadenopathy on the left. Skin biopsy was negative for an infiltrating tumor cells. For neoadjuvant chemotherapy she received 4 cycles of dose-dense AC from October through December 2010 followed by dose-dense Taxol from December 2010 through February 2011. The tumor responded to the therapy with reduction in size of the breast mass and she had a near complete response, which placed her in the favorable RCB-1 category.   She underwent a left mastectomy on 03/21/2011 and had a left axillary lymph node dissection along with a TAHBSO due to an abnormal Pap smear. Pathology of the breast showed a minutMsEdwin Amaro is a 75-year-old woman with a history of triple-negative infiltrating ductal carcinoma of the left breast diagnosed in 10/2010. The presentation was consistent with inflammatory breast cancer. She had a 4-cm mass in the upper outer quadrant of the left breast and some skin erythema as well as axillary lymphadenopathy on the left. Skin biopsy was negative for an infiltrating tumor cells. For neoadjuvant chemotherapy she received 4 cycles of dose-dense AC from October through December 2010 followed by dose-dense Taxol from December 2010 through February 2011. The tumor responded to the therapy with reduction in size of the breast mass and she had a near complete response, which placed her in the favorable RCB-1 category.   She underwent a left mastectomy on 03/21/2011  and had a left axillary lymph node dissection along with a TAHBSO due to an abnormal Pap smear. Pathology of the breast showed a minute focus of 0.2 cm of carcinoma which was grade 3 invasive ductal carcinoma, ER and HI negative, HER2 negative by immunohistochemistry and equivocal by FISH. The margins were negative. All 21 lymph nodes were negative. The uterus pathology showed an endometrial polyp and the ovaries and the fallopian tubes were normal.   Ms. Amaro went on to complete radiation therapy to the left chest wall, which she completed in July 2011. e focus of 0.2 cm of carcinoma which was grade 3 invasive ductal carcinoma, ER and HI negative, HER2 negative by immunohistochemistry and equivocal by FISH. The margins were negative. All 21 lymph nodes were negative. The uterus pathology showed an endometrial polyp and the ovaries and the fallopian tubes were normal.   FOLLOWUP NOTE      INTERVAL HISTORY:  Elvia returns to clinic for routine followup following her diagnosis of a left breast, triple-negative breast cancer in 2010.  She is about 7-1/2 years from her initial diagnosis.  She has no pain, no fatigue, no depression, no anxiety.  Squamous cell carcinoma on her right calf and basal cell carcinoma left leg anteriorly are without any evidence of recurrence.      REVIEW OF SYSTEMS:  She denies fevers or chills, cough, chest pain, shortness breath, nausea, vomiting, constipation, diarrhea, bone pain, back pain or headache.  The remainder of a 10-point review of systems is negative.      She was diagnosed with atrial fibrillation.  She continues on warfarin and has had a therapeutic INR.  She has been in sinus rhythm by her report.      PHYSICAL EXAMINATION:   VITAL SIGNS:  Blood pressure 115/52, temperature 97.6, pulse 63, respirations 16, O2 sat 96% on room air, height 1.66 meters and weight 95.3 kg.   GENERAL:  Elvia appears generally well.  She has no alopecia.   HEENT:  Oropharynx is without  lesions.   LYMPH:  There is no palpable cervical, supraclavicular, subclavicular or axillary lymphadenopathy.   BREASTS:  Examination of the right breast reveals no masses.  Examination of the left breast reveals a well-healed mastectomy incision without erythema or masses.  There are no masses in the anterior chest wall bilaterally.  There are some telangiectasias over the lateral aspect of the incision going into the axilla.   LUNGS:  Clear to percussion and auscultation.   HEART:  There was a regular rate and rhythm, S1, S2.   ABDOMEN:  Soft, nontender, consistent with increased body mass index.   EXTREMITIES:  Without edema.   SKIN:  Examination of the skin on the left shin and the right calf reveals no evidence of recurrence of the squamous cell carcinomas.      LABORATORY DATA:  The CBC and CMP were within normal limits.  She does have an elevated TSH of 7.1.      ASSESSMENT AND PLAN:  Elvia Amaro is a 77-year-old woman with:    1.  History of inflammatory triple-negative invasive ductal carcinoma of the left breast, stage IIIB, clinical stage [id number].  She was treated with neoadjuvant chemotherapy with 4 cycles of dose-dense AC followed by 4 cycles of dose-dense Taxol.  She went on to have a left mastectomy with clear margins.  There was a residual 0.2 cm focus of carcinoma present which was grade 3.  Left axillary node dissection revealed no lymph node involvement of 21 lymph nodes examined.  The pathologic stage was [id number].  The tumor was RCB1 with a 10% risk of recurrence at 5 years.  She is now out at year 8.  She completed radiation therapy and has had no evidence of recurrence at the 8-year divina.  Her mammogram in 10/2017 showed benign findings.   2.  Discussion of diet and exercise.  She is eating a low-fat diet.  She has been exercising at a local gym 1 hour 3 times a week, and I commended her on her efforts.   3.  Bone health.  She takes calcium and vitamin D.  She has had normal bone density  in the past with the last DEXA being on 03/26/2015.  I think repeating the DEXA at a 5-year interval would be reasonable since the DEXA was normal.   4.  Basal cell carcinoma on the left anterior shin and squamous carcinoma of the right calf.  Both were excised and show no evidence of recurrence.   5.  Atrial fibrillation is being managed by Dr. Kerr.  She is on warfarin as well as amiodarone, Coreg and meloxicam.   6.  Followup.  We will see Elvia in followup in our clinic with KELY in 6 months with a mammogram and with me in 1 year for a breast exam.      Thank you for allowing us to continue to participate in Elvia Amaro's care.  I did share with her that the risk of recurrence is quite low at this time.  All of her questions were answered.      Elijah Padilla MD       [facility]     I spent 20 minutes with the patient more than 50% of which was in counseling and coordination of care.

## 2018-04-05 NOTE — MR AVS SNAPSHOT
After Visit Summary   4/5/2018    Elvia Amaro    MRN: [id number]           Patient Information     Date Of Birth          1940        Visit Information        Provider Department      4/5/2018 12:30 PM Elijah Padilla MD [facility]        Today's Diagnoses     Malignant neoplasm of upper-outer quadrant of left breast in female, estrogen receptor negative (H)    -  1       Follow-ups after your visit        Follow-up notes from your care team     Return in about 1 year (around 4/4/2019).      Your next 10 appointments already scheduled     Apr 12, 2018 10:40 AM CDT   [facility]j [facility]   [facility]    [address] [facility] [id number] [phone]           Bring a current list of meds and any records pertaining to this visit. For Physicals, please bring immunization records and any forms needing to be filled out. Please arrive 10 minutes early to complete paperwork.            Apr 12, 2018  1:00 PM CDT   Anticoagulation Visit with  ANTI COAG   [facility]    [address] [facility] [id number] [phone]            Oct 04, 2018  1:00 PM CDT   Masonic Lab Draw with  MASONIC LAB DRAW   [facility] Masonic Lab Draw ([facility])    [address] [place] [address]  [address] [place] [id number] [phone]            Oct 04, 2018  1:30 PM CDT   (Arrive by 1:15 PM)   MA SCREENING DIGITAL BILATERAL with [id number]   [facility] ([facility])    [address] [place] [address]  2nd Floor  [facility] [id number] [phone]           Do not use any powder, lotion or deodorant under your arms or on your breast. If you do, we will ask you to remove it before your exam.  Wear comfortable, two-piece clothing.  If you have any allergies, tell your care team.  Bring any previous mammograms from other facilities or  "have them mailed to the breast center. Three-dimensional (3D) mammograms are available at [facility] locations in [place], [place], [place], [employer], [facility], [place], [place], and [place]. [facility] locations include [place] and [facility] in [place]. Benefits of 3D mammograms include: - Improved rate of cancer detection - Decreases your chance of having to go back for more tests, which means fewer: - \"False-positive\" results (This means that there is an abnormal area but it isn't cancer.) - Invasive testing procedures, such as a biopsy or surgery - Can provide clearer images of the breast if you have dense breast tissue. 3D mammography is an optional exam that anyone can have with a 2D mammogram. It doesn't replace or take the place of a 2D mammogram. 2D mammograms remain an effective screening test for all women.  Not all insurance companies cover the cost of a 3D mammogram. Check with your insurance.            Oct 04, 2018  2:00 PM CDT   (Arrive by 1:45 PM)   Survivorship Visit with Megan Rodríguez PA-C   [facility] ([facility])    [address] [place] [address]  [address]  [facility] [id number] [phone]            Apr 04, 2019 12:00 PM CDT   Masonic Lab Draw with  TapZen LAB DRAW   [facility] Lab Draw ([facility])    [address] [place] [address]  [address]  [facility] [id number] [phone]            Apr 04, 2019 12:30 PM CDT   (Arrive by 12:15 PM)   Return Visit with Elijah Padilla MD   [facility] ([facility])    [address] [place] [address]  [address]  [facility] [address] [phone]              Who to contact     If you have questions or need follow up information about today's clinic visit or your schedule please contact [facility] directly at [phone].  Normal or non-critical lab and imaging results will be " "communicated to you by [employer], letter or phone within 4 business days after the clinic has received the results. If you do not hear from us within 7 days, please contact the clinic through [employer] or phone. If you have a critical or abnormal lab result, we will notify you by phone as soon as possible.  Submit refill requests through [employer] or call your pharmacy and they will forward the refill request to us. Please allow 3 business days for your refill to be completed.          Additional Information About Your Visit        LocalSense Information     LocalS[employer] lets you send messages to your doctor, view your test results, renew your prescriptions, schedule appointments and more. To sign up, go to [email or web address] . Click on \"Log in\" on the left side of the screen, which will take you to the Welcome page. Then click on \"Sign up Now\" on the right side of the page.     You will be asked to enter the access code listed below, as well as some personal information. Please follow the directions to create your username and password.     Your access code is: [email or web address]  Expires: 2018  6:30 AM     Your access code will  in 90 days. If you need help or a new code, please call your [facility] clinic or [phone].        Care EveryWhere ID     This is your Care EveryWhere ID. This could be used by other organizations to access your [facility] medical records  [id number]        Your Vitals Were     Pulse Temperature Respirations Height Pulse Oximetry BMI (Body Mass Index)    63 97.6  F (36.4  C) (Oral) 18 1.664 m (5' 5.51\") 96% 34.4 kg/m2       Blood Pressure from Last 3 Encounters:   18 115/52   17 124/72   10/13/17 130/70    Weight from Last 3 Encounters:   18 95.3 kg (210 lb)   17 99.5 kg (219 lb 4 oz)   10/13/17 101.4 kg (223 lb 8 oz)               Primary Care Provider Office Phone # Fax #    Tej Tilley [phone]       [address] [facility] [address]      "   Equal Access to Services     [facility] : Hadii basil gonzalez rommel Vladivia, wawendyda luqadaha, qaybta kaangelinaenoch valencia. So [facility] [phone].    ATENCIÓN: Si habla español, tiene a good disposición servicios gratuitos de asistencia lingüística. Damarisame al [phone].    We comply with applicable federal civil rights laws and [place] laws. We do not discriminate on the basis of race, color, national origin, age, disability, sex, sexual orientation, or gender identity.            Thank you!     Thank you for choosing [facility]  for your care. Our goal is always to provide you with excellent care. Hearing back from our patients is one way we can continue to improve our services. Please take a few minutes to complete the written survey that you may receive in the mail after your visit with us. Thank you!             Your Updated Medication List - Protect others around you: Learn how to safely use, store and throw away your medicines at www.disposemymeds.org.          This list is accurate as of 4/5/18 11:59 PM.  Always use your most recent med list.                   Brand Name Dispense Instructions for use Diagnosis    ACE/ARB/ARNI NOT PRESCRIBED (INTENTIONAL)      1 each every 15 minutes ACE & ARB not prescribed due to Symptomatic hypotension not due to excessive diuresis    Atrial fibrillation, unspecified       amiodarone 200 MG tablet    PACERONE/CODARONE    90 tablet    TAKE 1 TABLET EVERY DAY    Atrial fibrillation, unspecified type (H)       CALCIUM + D PO      Take  by mouth.        carvedilol 12.5 MG tablet    COREG    180 tablet    TAKE 1 TABLET TWICE DAILY WITH MEALS    Cardiomyopathy (H)       furosemide 20 MG tablet    LASIX    90 tablet    TAKE 1 TABLET EVERY DAY    Localized edema, Congenital hypothyroidism without goiter, Acute systolic heart failure (H), Paroxysmal atrial fibrillation (H)       levothyroxine 100 MCG tablet     SYNTHROID/LEVOTHROID    90 tablet    Take 1 tablet (100 mcg) by mouth daily    Congenital hypothyroidism without goiter, Localized edema, Acute systolic heart failure (H), Paroxysmal atrial fibrillation (H)       meloxicam 7.5 MG tablet    MOBIC    90 tablet    TAKE 1 TABLET BY MOUTH EVER Y DAY    Degeneration of lumbar or lumbosacral intervertebral disc       * order for DME     1 Device    Equipment being ordered: Elastic Sleeve  Prosthetics Fax [phone]    Malignant neoplasm of left female breast, unspecified site of breast       * order for DME     1 Device    Equipment being ordered: breast prosthesis and bras    S/P left mastectomy, Malignant neoplasm of left female breast, unspecified site of breast       VITAMIN D-1000 MAX ST PO      Take  by mouth.        warfarin 2.5 MG tablet    COUMADIN    80 tablet    TAKE 1/2 TABLET (1.25 MG) TUESDAY, SATURDAY AND 1 TABLET ALL OTHER DAYS OR AS DIRECTED BY THE COUMADIN CLINIC    Paroxysmal atrial fibrillation (H), Long term current use of anticoagulant therapy       * Notice:  This list has 2 medication(s) that are the same as other medications prescribed for you. Read the directions carefully, and ask your doctor or other care provider to review them with you.

## 2018-04-05 NOTE — NURSING NOTE
Chief Complaint   Patient presents with     Blood Draw     Labs only     Vitals done, labs drawn by venipuncture.  See doc flow sheets for details.  Rachna Turner CMA

## 2018-04-12 ENCOUNTER — TELEPHONE (OUTPATIENT)
Dept: ANTICOAGULATION | Facility: OTHER | Age: 78
End: 2018-04-12

## 2018-04-12 ENCOUNTER — ANTICOAGULATION THERAPY VISIT (OUTPATIENT)
Dept: ANTICOAGULATION | Facility: OTHER | Age: 78
End: 2018-04-12
Payer: COMMERCIAL

## 2018-04-12 ENCOUNTER — OFFICE VISIT (OUTPATIENT)
Dept: FAMILY MEDICINE | Facility: OTHER | Age: 78
End: 2018-04-12
Payer: COMMERCIAL

## 2018-04-12 VITALS
RESPIRATION RATE: 18 BRPM | BODY MASS INDEX: 34.16 KG/M2 | WEIGHT: 205 LBS | TEMPERATURE: 98.1 F | OXYGEN SATURATION: 98 % | SYSTOLIC BLOOD PRESSURE: 126 MMHG | HEIGHT: 65 IN | HEART RATE: 66 BPM | DIASTOLIC BLOOD PRESSURE: 64 MMHG

## 2018-04-12 DIAGNOSIS — I50.21 ACUTE SYSTOLIC HEART FAILURE (H): ICD-10-CM

## 2018-04-12 DIAGNOSIS — R60.0 LOCALIZED EDEMA: ICD-10-CM

## 2018-04-12 DIAGNOSIS — I48.0 PAROXYSMAL ATRIAL FIBRILLATION (H): ICD-10-CM

## 2018-04-12 DIAGNOSIS — Z79.01 LONG-TERM (CURRENT) USE OF ANTICOAGULANTS: ICD-10-CM

## 2018-04-12 DIAGNOSIS — N85.00 ENDOMETRIAL HYPERPLASIA: Primary | ICD-10-CM

## 2018-04-12 DIAGNOSIS — Z79.01 LONG-TERM (CURRENT) USE OF ANTICOAGULANTS: Primary | ICD-10-CM

## 2018-04-12 DIAGNOSIS — I48.91 ATRIAL FIBRILLATION, UNSPECIFIED TYPE (H): ICD-10-CM

## 2018-04-12 DIAGNOSIS — E03.1 CONGENITAL HYPOTHYROIDISM WITHOUT GOITER: ICD-10-CM

## 2018-04-12 PROBLEM — Z71.89 COUNSELING REGARDING ADVANCED DIRECTIVES: Status: ACTIVE | Noted: 2018-04-12

## 2018-04-12 LAB
INR POINT OF CARE: 3.3 (ref 0.86–1.14)
T4 FREE SERPL-MCNC: 1.69 NG/DL (ref 0.76–1.46)

## 2018-04-12 PROCEDURE — 84439 ASSAY OF FREE THYROXINE: CPT | Performed by: FAMILY MEDICINE

## 2018-04-12 PROCEDURE — 36416 COLLJ CAPILLARY BLOOD SPEC: CPT

## 2018-04-12 PROCEDURE — 99207 ZZC NO CHARGE NURSE ONLY: CPT

## 2018-04-12 PROCEDURE — 85610 PROTHROMBIN TIME: CPT | Mod: QW

## 2018-04-12 PROCEDURE — 99214 OFFICE O/P EST MOD 30 MIN: CPT | Performed by: FAMILY MEDICINE

## 2018-04-12 RX ORDER — AMIODARONE HYDROCHLORIDE 200 MG/1
TABLET ORAL
Qty: 90 TABLET | Refills: 3 | COMMUNITY
Start: 2018-04-12 | End: 2019-01-23

## 2018-04-12 RX ORDER — PREDNISOLONE ACETATE 10 MG/ML
SUSPENSION/ DROPS OPHTHALMIC
COMMUNITY
Start: 2018-04-11 | End: 2018-10-22

## 2018-04-12 RX ORDER — LEVOTHYROXINE SODIUM 100 UG/1
100 TABLET ORAL DAILY
Qty: 90 TABLET | Refills: 3 | Status: SHIPPED | OUTPATIENT
Start: 2018-04-12 | End: 2019-02-07

## 2018-04-12 RX ORDER — OFLOXACIN 3 MG/ML
1-2 SOLUTION/ DROPS OPHTHALMIC
COMMUNITY
Start: 2018-04-11 | End: 2018-10-22

## 2018-04-12 RX ORDER — KETOROLAC TROMETHAMINE 4 MG/ML
SOLUTION/ DROPS OPHTHALMIC
COMMUNITY
Start: 2018-04-11 | End: 2018-10-22

## 2018-04-12 RX ORDER — PREDNISOLONE ACETATE 10 MG/ML
1-2 SUSPENSION/ DROPS OPHTHALMIC
COMMUNITY
Start: 2018-04-11 | End: 2018-10-22

## 2018-04-12 RX ORDER — OFLOXACIN 3 MG/ML
SOLUTION/ DROPS OPHTHALMIC
COMMUNITY
Start: 2018-04-11 | End: 2018-10-22

## 2018-04-12 RX ORDER — KETOROLAC TROMETHAMINE 4 MG/ML
1 SOLUTION/ DROPS OPHTHALMIC
COMMUNITY
Start: 2018-04-11 | End: 2018-10-22

## 2018-04-12 ASSESSMENT — PAIN SCALES - GENERAL: PAINLEVEL: NO PAIN (0)

## 2018-04-12 NOTE — PROGRESS NOTES
ANTICOAGULATION FOLLOW-UP CLINIC VISIT    Patient Name:  Elvia Amaro  Date:  4/12/2018  Contact Type:  Face to Face    SUBJECTIVE:     Patient Findings     Positives No Problem Findings           OBJECTIVE    INR Protime   Date Value Ref Range Status   04/12/2018 3.3 (A) 0.86 - 1.14 Final       ASSESSMENT / PLAN  INR assessment THER    Recheck INR In: 4 WEEKS    INR Location Clinic      Anticoagulation Summary as of 4/12/2018     INR goal 2.0-3.0   Today's INR 3.3!   Maintenance plan 1.25 mg (2.5 mg x 0.5) on Tue, Sat; 2.5 mg (2.5 mg x 1) all other days   Full instructions 1.25 mg on Tue, Sat; 2.5 mg all other days   Weekly total 15 mg   No change documented Campos Carrera RN   Plan last modified Campos Carrera RN (3/6/2017)   Next INR check 5/10/2018   Target end date     Indications   Long-term (current) use of anticoagulants [Z79.01] [Z79.01]  Atrial fibrillation (H) [I48.91]         Anticoagulation Episode Summary     INR check location     Preferred lab     Send INR reminders to [facility] POOL    Comments 2.5 mg tabs, PM dose      Anticoagulation Care Providers     Provider Role Specialty Phone number    Tej Tilley MD [facility] [phone]            See the Encounter Report to view Anticoagulation Flowsheet and Dosing Calendar (Go to Encounters tab in chart review, and find the Anticoagulation Therapy Visit)    Dosage adjustment made based on physician directed care plan.    Campos Carrera RN

## 2018-04-12 NOTE — LETTER
April 13, 2018      Elvia Larsenen  [id number] [address] [place] [id number]        Dear ,    We are writing to inform you of your test results.    Thyroid function remains stable. TSH is down and T4 level is stable. The current medical regimen is effective;  continue present plan and medications. Recheck Thyroid function again in 6 months.    Resulted Orders   T4 free   Result Value Ref Range    T4 Free 1.69 (H) 0.76 - 1.46 ng/dL       If you have any questions or concerns, please call the clinic at the number listed above.       Sincerely,        Tej Tilley MD

## 2018-04-12 NOTE — TELEPHONE ENCOUNTER
Please review and renew this patients INR referral, orders pending. Thank you!      Campos Carrera RN, BSN

## 2018-04-12 NOTE — NURSING NOTE
"Chief Complaint   Patient presents with     Thyroid Problem       Initial /64  Pulse 66  Temp 98.1  F (36.7  C) (Temporal)  Resp 18  Ht 5' 5\" (1.651 m)  Wt 205 lb (93 kg)  SpO2 98%  Breastfeeding? No  BMI 34.11 kg/m2 Estimated body mass index is 34.11 kg/(m^2) as calculated from the following:    Height as of this encounter: 5' 5\" (1.651 m).    Weight as of this encounter: 205 lb (93 kg).  Medication Reconciliation: complete    "

## 2018-04-12 NOTE — PROGRESS NOTES
SUBJECTIVE:   Elvia Amaro is a 77 year old female who presents to clinic today for the following health issues:      Hypothyroidism Follow-up      Since last visit, patient describes the following symptoms: Weight stable, no hair loss, no skin changes, no constipation, no loose stools and weight loss of 5 lbs      Amount of exercise or physical activity: 2-3 days/week for an average of 30-45 minutes    Problems taking medications regularly: No    Medication side effects: none    Diet: regular (no restrictions)        Hypertension Follow-up      Outpatient blood pressures are not being checked.    Low Salt Diet: no added salt    Vascular Disease Follow-up:  Cardiomyopathy      Chest pain or pressure, left side neck or arm pain: No    Shortness of breath/increased sweats/nausea with exertion: No    Pain in calves walking 1-2 blocks: No    Worsened or new symptoms since last visit: No    Nitroglycerin use: no    Daily aspirin use: Coumadin    Heart Failure Follow-up    Symptoms:    Shortness of breath: none    Lower extremity edema: none    Chest pain: No    Using more pillows than normal: No    Cough at night: No    Weight:    Checking weight daily: No    Weight change: none    Cardiology visits, ER/UC, or hospital admissions since last visit: None    Medication side effects: none      Problem list and histories reviewed & adjusted, as indicated.  Additional history: She was seen by Cardiology at [facility] last week. TSH was drawn but no reflex.  TSH   Date Value Ref Range Status   04/05/2018 7.10 (H) 0.40 - 4.00 mU/L Final   ]      Patient Active Problem List   Diagnosis     Endometrial hyperplasia     CARDIOVASCULAR SCREENING; LDL GOAL LESS THAN 130     Hypertension goal BP (blood pressure) < 140/90     Advanced directives, counseling/discussion     Skin exam, screening for cancer     Atrial fibrillation (H)     Acute systolic heart failure (H)     Paroxysmal atrial fibrillation (H)     Congenital hypothyroidism  without goiter     Edema     Long-term (current) use of anticoagulants [Z79.01]     Breast cancer of upper-outer quadrant of left female breast (H)     Morbid obesity (H)     Counseling regarding advanced directives     Past Surgical History:   Procedure Laterality Date     ANESTHESIA CARDIOVERSION N/A 9/18/2015    Procedure: ANESTHESIA CARDIOVERSION;  Surgeon: GENERIC ANESTHESIA PROVIDER;  Location: [facility] OR     BIOPSY SKIN (LOCATION)      left leg     BREAST SURGERY       COLONOSCOPY  8/29/2011    Procedure:COLONOSCOPY; colonoscopy; Surgeon:VICKIE DUFF; Location:[facility]     HC HYSTEROSCOPY W ENDOMETRIAL BX/POLYPECTOMY W/WO D&C  9/30/2005    Hysteroscopy. D&C. Cervical biopsies.     HC REMOVAL OF TONSILS,<13 Y/O      Tonsils <10y.o.     LAPAROSCOPIC HYSTERECTOMY TOTAL, BILATERAL SALPINGO-OOPHORECTOMY, COMBINED  3/21/11     MASTECTOMY  3/21/11    left mastectomy, lymph node dissection     MOHS MICROGRAPHIC PROCEDURE       NO HISTORY OF SURGERY       REMOVE CATHETER VASCULAR ACCESS  5/4/2011    Procedure:REMOVE CATHETER VASCULAR ACCESS; right subclavian; Surgeon:ESTER ROPER; Location:[facility] OR     VASCULAR SURGERY      port       Social History   Substance Use Topics     Smoking status: Never Smoker     Smokeless tobacco: Never Used     Alcohol use No     Family History   Problem Relation Age of Onset     DIABETES Father      CEREBROVASCULAR DISEASE Father      HEART DISEASE Mother      MI age 68     CANCER No family hx of      no skin cancer         Current Outpatient Prescriptions   Medication Sig Dispense Refill     ketorolac tromethamine (ACULAR-LS) 0.4 % SOLN ophthalmic solution Apply 1 drop to eye       ofloxacin (OCUFLOX) 0.3 % ophthalmic solution Apply 1-2 drops to eye       prednisoLONE acetate (PRED FORTE) 1 % ophthalmic susp Apply 1-2 drops to eye       furosemide (LASIX) 20 MG tablet TAKE 1 TABLET EVERY DAY 90 tablet 1     warfarin (COUMADIN) 2.5 MG tablet TAKE 1/2 TABLET (1.25 MG) TUESDAY, SATURDAY AND 1  TABLET ALL OTHER DAYS OR AS DIRECTED BY THE COUMADIN CLINIC 80 tablet 1     carvedilol (COREG) 12.5 MG tablet TAKE 1 TABLET TWICE DAILY WITH MEALS 180 tablet 3     levothyroxine (SYNTHROID/LEVOTHROID) 100 MCG tablet Take 1 tablet (100 mcg) by mouth daily 90 tablet 1     meloxicam (MOBIC) 7.5 MG tablet TAKE 1 TABLET BY MOUTH EVER Y DAY 90 tablet 3     order for DME Equipment being ordered: breast prosthesis and bras 1 Device 0     order for DME Equipment being ordered: Elastic Sleeve   Prosthetics Fax [phone] 1 Device 1     Calcium Carbonate-Vitamin D (CALCIUM + D PO) Take  by mouth.       Cholecalciferol (VITAMIN D-1000 MAX ST PO) Take  by mouth.       ketorolac tromethamine (ACULAR-LS) 0.4 % SOLN ophthalmic solution        ofloxacin (OCUFLOX) 0.3 % ophthalmic solution        prednisoLONE acetate (PRED FORTE) 1 % ophthalmic susp        amiodarone (PACERONE/CODARONE) 200 MG tablet TAKE 1 TABLET EVERY DAY (Patient not taking: Reported on 4/12/2018) 90 tablet 3     ACE/ARB NOT PRESCRIBED, INTENTIONAL, 1 each every 15 minutes ACE & ARB not prescribed due to Symptomatic hypotension not due to excessive diuresis (Patient not taking: Reported on 4/5/2018)       Allergies   Allergen Reactions     Bacitracin      Skin Irritation     Cephalexin Hcl Itching     Clindamycin Hcl Itching     Recent Labs   Lab Test  04/05/18   1224  10/13/17   0948  10/05/17   1415  08/10/17   0809   07/24/13   0801   LDL   --    --    --    --    --   124   HDL   --    --    --    --    --   42*   TRIG   --    --    --    --    --   63   ALT  30   --   28  24   < >   --    CR  0.88   --   0.88  0.95   < >   --    GFRESTIMATED  62   --   62  56*   < >   --    GFRESTBLACK  75   --   75  68   < >   --    POTASSIUM  3.9   --   4.3  4.1   < >   --    TSH  7.10*  8.00*   --   8.94*   < >   --     < > = values in this interval not displayed.      BP Readings from Last 3 Encounters:   04/12/18 126/64   04/05/18 115/52   11/30/17 124/72    Wt  "Readings from Last 3 Encounters:   04/12/18 205 lb (93 kg)   04/05/18 210 lb (95.3 kg)   11/30/17 219 lb 4 oz (99.5 kg)                  Labs reviewed in EPIC    Reviewed and updated as needed this visit by clinical staff  Tobacco  Allergies  Meds  Problems  Soc Hx      Reviewed and updated as needed this visit by Provider  Allergies  Meds  Problems         ROS:  Constitutional, HEENT, cardiovascular, pulmonary, gi and gu systems are negative, except as otherwise noted.    OBJECTIVE:     /64  Pulse 66  Temp 98.1  F (36.7  C) (Temporal)  Resp 18  Ht 5' 5\" (1.651 m)  Wt 205 lb (93 kg)  SpO2 98%  Breastfeeding? No  BMI 34.11 kg/m2  Body mass index is 34.11 kg/(m^2).  GENERAL: alert, no distress, over weight and elderly  EYES: Eyes grossly normal to inspection, PERRL and conjunctivae and sclerae normal  HENT: ear canals and TM's normal, nose and mouth without ulcers or lesions  NECK: no adenopathy, no asymmetry, masses, or scars and thyroid normal to palpation  RESP: lungs clear to auscultation - no rales, rhonchi or wheezes  CV: regular rate and rhythm, normal S1 S2, no S3 or S4, no murmur, click or rub, no peripheral edema and peripheral pulses strong  ABDOMEN: soft, nontender, no hepatosplenomegaly, no masses and bowel sounds normal  MS: no gross musculoskeletal defects noted, no edema    Diagnostic Test Results:  No results found for this or any previous visit (from the past 24 hour(s)).    ASSESSMENT/PLAN:         1. Endometrial hyperplasia  She continues to follow up with oncology. Dr. Padilla.  - HEART FAILURE ACTION PLAN    2. Atrial fibrillation, unspecified type (H)  Chronic stable. Followed by Dr. Kerr. Rate and rhythm controlled and anticoagulated. The current medical regimen is effective;  continue present plan and medications.   - amiodarone (PACERONE/CODARONE) 200 MG tablet; TAKE 1 TABLET EVERY DAY  Dispense: 90 tablet; Refill: 3    3. Congenital hypothyroidism without " "goiter  Chronic. Stable. TSH improved, pending T4 level.  - levothyroxine (SYNTHROID/LEVOTHROID) 100 MCG tablet; Take 1 tablet (100 mcg) by mouth daily  Dispense: 90 tablet; Refill: 3  - T4 free    4. Localized edema  Chronic controlled. The current medical regimen is effective;  continue present plan and medications.   - levothyroxine (SYNTHROID/LEVOTHROID) 100 MCG tablet; Take 1 tablet (100 mcg) by mouth daily  Dispense: 90 tablet; Refill: 3    5. Acute systolic heart failure (H)  Chronic stable. Followed by Dr. Kerr. Rate and rhythm controlled and anticoagulated. The current medical regimen is effective;  continue present plan and medications.   - levothyroxine (SYNTHROID/LEVOTHROID) 100 MCG tablet; Take 1 tablet (100 mcg) by mouth daily  Dispense: 90 tablet; Refill: 3      BMI:   Estimated body mass index is 34.11 kg/(m^2) as calculated from the following:    Height as of this encounter: 5' 5\" (1.651 m).    Weight as of this encounter: 205 lb (93 kg).   Weight management plan: Discussed healthy diet and exercise guidelines and patient will follow up in 6 months in clinic to re-evaluate.    FUTURE APPOINTMENTS:       - Follow-up visit in 6 months.  SELF MONITORING:       - Please check blood pressure readings daily       - Daily weights.  Work on weight loss  Regular exercise    Tej Tilley MD  [facility]  "

## 2018-04-12 NOTE — MR AVS SNAPSHOT
Elvia Amaro   4/12/2018 1:00 PM   Anticoagulation Therapy Visit    Description:  77 year old female   Provider:  [facility]   Department:  [facility]           INR as of 4/12/2018     Today's INR 3.3!      Anticoagulation Summary as of 4/12/2018     INR goal 2.0-3.0   Today's INR 3.3!   Full instructions 1.25 mg on Tue, Sat; 2.5 mg all other days   Next INR check 5/10/2018    Indications   Long-term (current) use of anticoagulants [Z79.01] [Z79.01]  Atrial fibrillation (H) [I48.91]         Your next Anticoagulation Clinic appointment(s)     Apr 12, 2018  1:00 PM CDT   [facility]  [facility]   [facility]    [address] [facility] [address]   [phone]            May 10, 2018  8:30 AM CDT   Anticoagulation Visit with  [facility]   [facility] ([facility]    [address] [facility] [address]   [phone]              Contact Numbers     Clinic Number:         April 2018 Details    Sun Mon Tue Wed Thu Fri Sat     1               2               3               4               5               6               7                 8               9               10               11               12      2.5 mg   See details      13      2.5 mg         14      1.25 mg           15      2.5 mg         16      2.5 mg         17      1.25 mg         18      2.5 mg         19      2.5 mg         20      2.5 mg         21      1.25 mg           22      2.5 mg         23      2.5 mg         24      1.25 mg         25      2.5 mg         26      2.5 mg         27      2.5 mg         28      1.25 mg           29      2.5 mg         30      2.5 mg               Date Details   04/12 This INR check               How to take your warfarin dose     To take:  1.25 mg Take 0.5 of a 2.5 mg tablet.    To take:  2.5 mg Take 1 of the 2.5 mg tablets.           May 2018 Details    Sun Mon Tue Wed Thu Fri Sat       1      1.25 mg         2       2.5 mg         3      2.5 mg         4      2.5 mg         5      1.25 mg           6      2.5 mg         7      2.5 mg         8      1.25 mg         9      2.5 mg         10            11               12                 13               14               15               16               17               18               19                 20               21               22               23               24               25               26                 27               28               29               30               31                  Date Details   No additional details    Date of next INR:  5/10/2018         How to take your warfarin dose     To take:  1.25 mg Take 0.5 of a 2.5 mg tablet.    To take:  2.5 mg Take 1 of the 2.5 mg tablets.

## 2018-04-12 NOTE — MR AVS SNAPSHOT
After Visit Summary   4/12/2018    Elvia Amaro    MRN: [id number]           Patient Information     Date Of Birth          1940        Visit Information        Provider Department      4/12/2018 10:40 AM Tej Tilley MD [facility]        Today's Diagnoses     Endometrial hyperplasia    -  1    Atrial fibrillation, unspecified type (H)        Congenital hypothyroidism without goiter        Localized edema        Acute systolic heart failure (H)        Paroxysmal atrial fibrillation (H)           Follow-ups after your visit        Your next 10 appointments already scheduled     May 10, 2018  8:30 AM CDT   Anticoagulation Visit with  ANTI COAG   [facility] ([facility]    [address]  [facility] [address]   [phone]            Oct 04, 2018  1:00 PM CDT   [facility] Lab Draw with  [facility]AS[facility] LAB DRAW   [facility] Masonic Lab Draw ([facility])    [address]  [address]  [facility] [id number] [phone]            Oct 04, 2018  1:30 PM CDT   (Arrive by 1:15 PM)   MA SCREENING DIGITAL BILATERAL with [email or web address]   [facility] ([facility])    [address]  2nd Floor  [facility] [id number] [phone]           Do not use any powder, lotion or deodorant under your arms or on your breast. If you do, we will ask you to remove it before your exam.  Wear comfortable, two-piece clothing.  If you have any allergies, tell your care team.  Bring any previous mammograms from other facilities or have them mailed to the breast center. Three-dimensional (3D) mammograms are available at [place] locations in [facility], [facility], [facility], and [place]. [facility] locations include [place] [facility] in [place]. Benefits of 3D mammograms include: - Improved rate of cancer detection - Decreases your chance of  "having to go back for more tests, which means fewer: - \"False-positive\" results (This means that there is an abnormal area but it isn't cancer.) - Invasive testing procedures, such as a biopsy or surgery - Can provide clearer images of the breast if you have dense breast tissue. 3D mammography is an optional exam that anyone can have with a 2D mammogram. It doesn't replace or take the place of a 2D mammogram. 2D mammograms remain an effective screening test for all women.  Not all insurance companies cover the cost of a 3D mammogram. Check with your insurance.            Oct 04, 2018  2:00 PM CDT   (Arrive by 1:45 PM)   Survivorship Visit with Megan Rodríguez PA-C   [facility] ([facility])    [address] [place] [address]  [address]  [facility] [address]   [phone]            Apr 04, 2019 12:00 PM CDT   [facility] Lab Draw with  [facility] LAB DRAW   [facility] Lab Draw ([facility])    [address] [place] [address]  [address]  [facility] [address]   [phone]            Apr 04, 2019 12:30 PM CDT   (Arrive by 12:15 PM)   Return Visit with Elijah Padilla MD   [facility] ([facility])    [address] [place] [address]  [address]  [facility] [address]   [phone]              Who to contact     If you have questions or need follow up information about today's clinic visit or your schedule please contact [facility] directly at [phone].  Normal or non-critical lab and imaging results will be communicated to you by MyChart, letter or phone within 4 business days after the clinic has received the results. If you do not hear from us within 7 days, please contact the clinic through MyChart or phone. If you have a critical or abnormal lab result, we will notify you by phone as soon as possible.  Submit refill requests through [employer] or call your pharmacy and they will forward the refill " "request to us. Please allow 3 business days for your refill to be completed.          Additional Information About Your Visit        [employer] Information     [employer] lets you send messages to your doctor, view your test results, renew your prescriptions, schedule appointments and more. To sign up, go to [email or web address] . Click on \"Log in\" on the left side of the screen, which will take you to the Welcome page. Then click on \"Sign up Now\" on the right side of the page.     You will be asked to enter the access code listed below, as well as some personal information. Please follow the directions to create your username and password.     Your access code is: [email or web address]  Expires: 2018  6:30 AM     Your access code will  in 90 days. If you need help or a new code, please call your [place] clinic or [phone].        Care EveryWhere ID     This is your Care EveryWhere ID. This could be used by other organizations to access your [place] medical records  [id number]        Your Vitals Were     Pulse Temperature Respirations Height Pulse Oximetry Breastfeeding?    66 98.1  F (36.7  C) (Temporal) 18 5' 5\" (1.651 m) 98% No    BMI (Body Mass Index)                   34.11 kg/m2            Blood Pressure from Last 3 Encounters:   18 126/64   18 115/52   17 124/72    Weight from Last 3 Encounters:   18 205 lb (93 kg)   18 210 lb (95.3 kg)   17 219 lb 4 oz (99.5 kg)              We Performed the Following     HEART FAILURE ACTION PLAN     T4 free          Today's Medication Changes          These changes are accurate as of 18  2:25 PM.  If you have any questions, ask your nurse or doctor.               These medicines have changed or have updated prescriptions.        Dose/Directions    amiodarone 200 MG tablet   Commonly known as:  PACERONE/CODARONE   This may have changed:  See the new instructions.   Used for:  Atrial fibrillation, unspecified type (H)   Changed " by:  Tej Tilley MD        TAKE 1 TABLET EVERY DAY   Quantity:  90 tablet   Refills:  3            Where to get your medicines      These medications were sent to [facility] - [place], [place] - [address]  [address] , [facility] [address]     Phone:  [phone]     levothyroxine 100 MCG tablet                Primary Care Provider Office Phone # Fax #    Tej Tilley [phone]       [address] [facility] [address]        Equal Access to Services     [facility] REENA : Hadii aad ku hadasho Soomaali, waaxda luqadaha, qaybta kaalmada adeegyada, waxay idiin hayaan adeeg kharagio florentino . So [facility] [phone].    ATENCIÓN: Si habla español, tiene a good disposición servicios gratuitos de asistencia lingüística. Ll[facility] [phone].    We comply with applicable federal civil rights laws and [place] laws. We do not discriminate on the basis of race, color, national origin, age, disability, sex, sexual orientation, or gender identity.            Thank you!     Thank you for choosing [facility]  for your care. Our goal is always to provide you with excellent care. Hearing back from our patients is one way we can continue to improve our services. Please take a few minutes to complete the written survey that you may receive in the mail after your visit with us. Thank you!             Your Updated Medication List - Protect others around you: Learn how to safely use, store and throw away your medicines at www.disposemymeds.org.          This list is accurate as of 4/12/18  2:25 PM.  Always use your most recent med list.                   Brand Name Dispense Instructions for use Diagnosis    ACE/ARB/ARNI NOT PRESCRIBED (INTENTIONAL)      1 each every 15 minutes ACE & ARB not prescribed due to Symptomatic hypotension not due to excessive diuresis    Atrial fibrillation, unspecified       amiodarone 200 MG tablet    PACERONE/CODARONE    90 tablet    TAKE 1 TABLET  EVERY DAY    Atrial fibrillation, unspecified type (H)       CALCIUM + D PO      Take  by mouth.        carvedilol 12.5 MG tablet    COREG    180 tablet    TAKE 1 TABLET TWICE DAILY WITH MEALS    Cardiomyopathy (H)       furosemide 20 MG tablet    LASIX    90 tablet    TAKE 1 TABLET EVERY DAY    Localized edema, Congenital hypothyroidism without goiter, Acute systolic heart failure (H), Paroxysmal atrial fibrillation (H)       * ketorolac tromethamine 0.4 % Soln ophthalmic solution    ACULAR-LS     Apply 1 drop to eye        * ketorolac tromethamine 0.4 % Soln ophthalmic solution    ACULAR-LS          levothyroxine 100 MCG tablet    SYNTHROID/LEVOTHROID    90 tablet    Take 1 tablet (100 mcg) by mouth daily    Congenital hypothyroidism without goiter, Localized edema, Acute systolic heart failure (H), Paroxysmal atrial fibrillation (H)       meloxicam 7.5 MG tablet    MOBIC    90 tablet    TAKE 1 TABLET BY MOUTH EVER Y DAY    Degeneration of lumbar or lumbosacral intervertebral disc       * ofloxacin 0.3 % ophthalmic solution    OCUFLOX     Apply 1-2 drops to eye        * ofloxacin 0.3 % ophthalmic solution    OCUFLOX          * order for DME     1 Device    Equipment being ordered: Elastic Sleeve  Prosthetics Fax [phone]    Malignant neoplasm of left female breast, unspecified site of breast       * order for DME     1 Device    Equipment being ordered: breast prosthesis and bras    S/P left mastectomy, Malignant neoplasm of left female breast, unspecified site of breast       * prednisoLONE acetate 1 % ophthalmic susp    PRED FORTE     Apply 1-2 drops to eye        * prednisoLONE acetate 1 % ophthalmic susp    PRED FORTE          VITAMIN D-1000 MAX ST PO      Take  by mouth.        warfarin 2.5 MG tablet    COUMADIN    80 tablet    TAKE 1/2 TABLET (1.25 MG) TUESDAY, SATURDAY AND 1 TABLET ALL OTHER DAYS OR AS DIRECTED BY THE COUMADIN CLINIC    Paroxysmal atrial fibrillation (H), Long term current use of  anticoagulant therapy       * Notice:  This list has 8 medication(s) that are the same as other medications prescribed for you. Read the directions carefully, and ask your doctor or other care provider to review them with you.

## 2018-05-10 ENCOUNTER — ANTICOAGULATION THERAPY VISIT (OUTPATIENT)
Dept: ANTICOAGULATION | Facility: OTHER | Age: 78
End: 2018-05-10
Payer: COMMERCIAL

## 2018-05-10 DIAGNOSIS — I48.91 ATRIAL FIBRILLATION, UNSPECIFIED TYPE (H): ICD-10-CM

## 2018-05-10 DIAGNOSIS — Z79.01 LONG-TERM (CURRENT) USE OF ANTICOAGULANTS: ICD-10-CM

## 2018-05-10 LAB — INR POINT OF CARE: 3.4 (ref 0.86–1.14)

## 2018-05-10 PROCEDURE — 85610 PROTHROMBIN TIME: CPT | Mod: QW

## 2018-05-10 PROCEDURE — 99207 ZZC NO CHARGE NURSE ONLY: CPT

## 2018-05-10 PROCEDURE — 36416 COLLJ CAPILLARY BLOOD SPEC: CPT

## 2018-05-10 NOTE — PROGRESS NOTES
ANTICOAGULATION FOLLOW-UP CLINIC VISIT    Patient Name:  Elvia Amaro  Date:  5/10/2018  Contact Type:  Face to Face    SUBJECTIVE:     Patient Findings     Positives Unexplained INR or factor level change    Comments Last INR was slightly elevated as well. I have decreased her dose slightly and we will recheck in 4 weeks. Patient verbalized understanding and agrees with plan of care. Pt had no further questions or concerns at this time.           OBJECTIVE    INR Protime   Date Value Ref Range Status   05/10/2018 3.4 (A) 0.86 - 1.14 Final       ASSESSMENT / PLAN  INR assessment SUPRA    Recheck INR In: 4 WEEKS    INR Location Clinic      Anticoagulation Summary as of 5/10/2018     INR goal 2.0-3.0   Today's INR 3.4!   Maintenance plan 1.25 mg (2.5 mg x 0.5) on Tue, Thu, Sat; 2.5 mg (2.5 mg x 1) all other days   Full instructions 5/10: 1.25 mg; Otherwise 1.25 mg on Tue, Thu, Sat; 2.5 mg all other days   Weekly total 13.75 mg   Plan last modified Campos Carrera RN (5/10/2018)   Next INR check 6/7/2018   Target end date     Indications   Long-term (current) use of anticoagulants [Z79.01] [Z79.01]  Atrial fibrillation (H) [I48.91]         Anticoagulation Episode Summary     INR check location     Preferred lab     Send INR reminders to [facility]    Comments 2.5 mg tabs, PM dose, appt card      Anticoagulation Care Providers     Provider Role Specialty Phone number    Tej Tilley MD [facility] [phone]            See the Encounter Report to view Anticoagulation Flowsheet and Dosing Calendar (Go to Encounters tab in chart review, and find the Anticoagulation Therapy Visit)    Dosage adjustment made based on physician directed care plan.    Campos Carrera RN

## 2018-05-10 NOTE — MR AVS SNAPSHOT
Elviapearl Amaro   5/10/2018 8:30 AM   Anticoagulation Therapy Visit    Description:  77 year old female   Provider:  STACIE ANTI COAG   Department:  [facility]           INR as of 5/10/2018     Today's INR 3.4!      Anticoagulation Summary as of 5/10/2018     INR goal 2.0-3.0   Today's INR 3.4!   Full instructions 5/10: 1.25 mg; Otherwise 1.25 mg on Tue, Thu, Sat; 2.5 mg all other days   Next INR check 6/7/2018    Indications   Long-term (current) use of anticoagulants [Z79.01] [Z79.01]  Atrial fibrillation (H) [I48.91]         Your next Anticoagulation Clinic appointment(s)     Jun 07, 2018  9:00 AM CDT   Anticoagulation Visit with [facility] ANTI COAG   [facility] ([facility])    [address] [facility] [address]   [phone]              Contact Numbers     Clinic Number:         May 2018 Details    Sun Mon Tue Wed Thu Fri Sat       1               2               3               4               5                 6               7               8               9               10      1.25 mg   See details      11      2.5 mg         12      1.25 mg           13      2.5 mg         14      2.5 mg         15      1.25 mg         16      2.5 mg         17      1.25 mg         18      2.5 mg         19      1.25 mg           20      2.5 mg         21      2.5 mg         22      1.25 mg         23      2.5 mg         24      1.25 mg         25      2.5 mg         26      1.25 mg           27      2.5 mg         28      2.5 mg         29      1.25 mg         30      2.5 mg         31      1.25 mg            Date Details   05/10 This INR check               How to take your warfarin dose     To take:  1.25 mg Take 0.5 of a 2.5 mg tablet.    To take:  2.5 mg Take 1 of the 2.5 mg tablets.           June 2018 Details    Sun Mon Tue Wed Thu Fri Sat          1      2.5 mg         2      1.25 mg           3      2.5 mg         4      2.5 mg         5      1.25 mg         6      2.5 mg         7             8               9                 10               11               12               13               14               15               16                 17               18               19               20               21               22               23                 24               25               26               27               28               29               30                Date Details   No additional details    Date of next INR:  6/7/2018         How to take your warfarin dose     To take:  1.25 mg Take 0.5 of a 2.5 mg tablet.    To take:  2.5 mg Take 1 of the 2.5 mg tablets.

## 2018-05-15 ENCOUNTER — TELEPHONE (OUTPATIENT)
Dept: FAMILY MEDICINE | Facility: OTHER | Age: 78
End: 2018-05-15

## 2018-05-15 NOTE — TELEPHONE ENCOUNTER
Patient is having a cataract surgery on 5/22/18 and needs to know if and when she should stop taking her coumadin?  If so, when should she restart it?  Dr Tilley is out of clinic until 5/21/18, can another provider please address this in the meantime?    Please call her to advise.  It is ok to leave a detailed message on her phone.  Thank you,  Sushma GUTIERREZ

## 2018-05-15 NOTE — TELEPHONE ENCOUNTER
The patient does not need to discontinue Coumadin for a cataract extraction/intraocular lens placement.    Maria D

## 2018-06-07 ENCOUNTER — ANTICOAGULATION THERAPY VISIT (OUTPATIENT)
Dept: ANTICOAGULATION | Facility: OTHER | Age: 78
End: 2018-06-07
Payer: COMMERCIAL

## 2018-06-07 DIAGNOSIS — Z79.01 LONG-TERM (CURRENT) USE OF ANTICOAGULANTS: ICD-10-CM

## 2018-06-07 DIAGNOSIS — I48.91 ATRIAL FIBRILLATION, UNSPECIFIED TYPE (H): ICD-10-CM

## 2018-06-07 LAB — INR POINT OF CARE: 2.3 (ref 0.86–1.14)

## 2018-06-07 PROCEDURE — 99207 ZZC NO CHARGE NURSE ONLY: CPT

## 2018-06-07 PROCEDURE — 85610 PROTHROMBIN TIME: CPT | Mod: QW

## 2018-06-07 PROCEDURE — 36416 COLLJ CAPILLARY BLOOD SPEC: CPT

## 2018-06-07 NOTE — PROGRESS NOTES
ANTICOAGULATION FOLLOW-UP CLINIC VISIT    Patient Name:  Elvia Amaro  Date:  6/7/2018  Contact Type:  Face to Face    SUBJECTIVE:     Patient Findings     Positives No Problem Findings           OBJECTIVE    INR Protime   Date Value Ref Range Status   06/07/2018 2.3 (A) 0.86 - 1.14 Final       ASSESSMENT / PLAN  INR assessment THER    Recheck INR In: 6 WEEKS    INR Location Clinic      Anticoagulation Summary as of 6/7/2018     INR goal 2.0-3.0   Today's INR 2.3   Warfarin maintenance plan 1.25 mg (2.5 mg x 0.5) on Tue, Thu, Sat; 2.5 mg (2.5 mg x 1) all other days   Full warfarin instructions 1.25 mg on Tue, Thu, Sat; 2.5 mg all other days   Weekly warfarin total 13.75 mg   No change documented Manuela Hanley RN   Plan last modified Campos Carrera RN (5/10/2018)   Next INR check 7/19/2018   Target end date     Indications   Long-term (current) use of anticoagulants [Z79.01] [Z79.01]  Atrial fibrillation (H) [I48.91]         Anticoagulation Episode Summary     INR check location     Preferred lab     Send INR reminders to [facility]    Comments 2.5 mg tabs, PM dose, appt card      Anticoagulation Care Providers     Provider Role Specialty Phone number    Tej Tilley MD [facility] [phone]            See the Encounter Report to view Anticoagulation Flowsheet and Dosing Calendar (Go to Encounters tab in chart review, and find the Anticoagulation Therapy Visit)    Dosage adjustment made based on physician directed care plan.      Manuela Hanley RN

## 2018-06-07 NOTE — MR AVS SNAPSHOT
Elvia WOMACK Sondra   6/7/2018 9:00 AM   Anticoagulation Therapy Visit    Description:  77 year old female   Provider:  [facility]AC[facility] ANTI COAG   Department:  [facility]           INR as of 6/7/2018     Today's INR 2.3      Anticoagulation Summary as of 6/7/2018     INR goal 2.0-3.0   Today's INR 2.3   Full warfarin instructions 1.25 mg on Tue, Thu, Sat; 2.5 mg all other days   Next INR check 7/19/2018    Indications   Long-term (current) use of anticoagulants [Z79.01] [Z79.01]  Atrial fibrillation (H) [I48.91]         Your next Anticoagulation Clinic appointment(s)     Jul 19, 2018  9:00 AM CDT   [facility] with [facility] ANTI [facility]   [facility]    [address] [facility] [address]   [phone]              Contact Numbers     Clinic Number:         June 2018 Details    Sun Mon Tue Wed Thu Fri Sat          1               2                 3               4               5               6               7      1.25 mg   See details      8      2.5 mg         9      1.25 mg           10      2.5 mg         11      2.5 mg         12      1.25 mg         13      2.5 mg         14      1.25 mg         15      2.5 mg         16      1.25 mg           17      2.5 mg         18      2.5 mg         19      1.25 mg         20      2.5 mg         21      1.25 mg         22      2.5 mg         23      1.25 mg           24      2.5 mg         25      2.5 mg         26      1.25 mg         27      2.5 mg         28      1.25 mg         29      2.5 mg         30      1.25 mg          Date Details   06/07 This INR check               How to take your warfarin dose     To take:  1.25 mg Take 0.5 of a 2.5 mg tablet.    To take:  2.5 mg Take 1 of the 2.5 mg tablets.           July 2018 Details    Sun Mon Tue Wed Thu Fri Sat     1      2.5 mg         2      2.5 mg         3      1.25 mg         4      2.5 mg         5      1.25 mg         6      2.5 mg         7      1.25 mg           8      2.5  mg         9      2.5 mg         10      1.25 mg         11      2.5 mg         12      1.25 mg         13      2.5 mg         14      1.25 mg           15      2.5 mg         16      2.5 mg         17      1.25 mg         18      2.5 mg         19            20               21                 22               23               24               25               26               27               28                 29               30               31                    Date Details   No additional details    Date of next INR:  7/19/2018         How to take your warfarin dose     To take:  1.25 mg Take 0.5 of a 2.5 mg tablet.    To take:  2.5 mg Take 1 of the 2.5 mg tablets.

## 2018-06-26 DIAGNOSIS — Z79.01 LONG TERM CURRENT USE OF ANTICOAGULANT THERAPY: ICD-10-CM

## 2018-06-26 DIAGNOSIS — I48.0 PAROXYSMAL ATRIAL FIBRILLATION (H): ICD-10-CM

## 2018-06-26 RX ORDER — WARFARIN SODIUM 2.5 MG/1
TABLET ORAL
Qty: 80 TABLET | Refills: 1 | Status: SHIPPED | OUTPATIENT
Start: 2018-06-26 | End: 2019-01-16

## 2018-06-26 NOTE — TELEPHONE ENCOUNTER
"Requested Prescriptions   Pending Prescriptions Disp Refills     warfarin (COUMADIN) 2.5 MG tablet [Pharmacy Med Name: WARFARIN SODIUM 2.5 MG Tablet] 80 tablet 1     Sig: TAKE 1/2 TABLET (1.25 MG) TUESDAY AND SATURDAY AND 1 TABLET ALL OTHER DAYS OR AS DIRECTED BY THE COUMADIN CLINIC    Vitamin K Antagonists Failed    6/26/2018  4:15 AM       Failed - INR is within goal in the past 6 weeks    Confirm INR is within goal in the past 6 weeks.     Recent Labs   Lab Test 06/07/18   INR  2.3*                      Passed - Recent (12 mo) or future (30 days) visit within the authorizing provider's specialty    Patient had office visit in the last 12 months or has a visit in the next 30 days with authorizing provider or within the authorizing provider's specialty.  See \"Patient Info\" tab in inbasket, or \"Choose Columns\" in Meds & Orders section of the refill encounter.           Passed - Patient is 18 years of age or older       Passed - Patient is not pregnant       Passed - No positive pregnancy on file in past 12 months        Last Written Prescription Date:  2/6/18  Last Fill Quantity: 80,  # refills: 1   Last office visit: 4/12/2018 with prescribing provider:     Future Office Visit:      "

## 2018-07-19 ENCOUNTER — ANTICOAGULATION THERAPY VISIT (OUTPATIENT)
Dept: ANTICOAGULATION | Facility: OTHER | Age: 78
End: 2018-07-19
Payer: COMMERCIAL

## 2018-07-19 DIAGNOSIS — I48.91 ATRIAL FIBRILLATION, UNSPECIFIED TYPE (H): ICD-10-CM

## 2018-07-19 DIAGNOSIS — Z79.01 LONG-TERM (CURRENT) USE OF ANTICOAGULANTS: ICD-10-CM

## 2018-07-19 LAB — INR POINT OF CARE: 2.8 (ref 0.86–1.14)

## 2018-07-19 PROCEDURE — 36416 COLLJ CAPILLARY BLOOD SPEC: CPT

## 2018-07-19 PROCEDURE — 85610 PROTHROMBIN TIME: CPT | Mod: QW

## 2018-07-19 PROCEDURE — 99207 ZZC NO CHARGE NURSE ONLY: CPT

## 2018-07-19 NOTE — MR AVS SNAPSHOT
Elviapearl Amaro   7/19/2018 9:00 AM   Anticoagulation Therapy Visit    Description:  78 year old female   Provider:  [facility] ANTI COAG   Department:  [facility]           INR as of 7/19/2018     Today's INR 2.8      Anticoagulation Summary as of 7/19/2018     INR goal 2.0-3.0   Today's INR 2.8   Full warfarin instructions 1.25 mg on Tue, Thu, Sat; 2.5 mg all other days   Next INR check 8/30/2018    Indications   Long-term (current) use of anticoagulants [Z79.01] [Z79.01]  Atrial fibrillation (H) [I48.91]         Your next Anticoagulation Clinic appointment(s)     Aug 30, 2018  9:00 AM CDT   [facility] with [facility]TI [facility]AG   [facility]    [address] [facility] [address]   [phone]              Contact Numbers     Clinic Number:         July 2018 Details    Sun Mon Tue Wed Thu Fri Sat     1               2               3               4               5               6               7                 8               9               10               11               12               13               14                 15               16               17               18               19      1.25 mg   See details      20      2.5 mg         21      1.25 mg           22      2.5 mg         23      2.5 mg         24      1.25 mg         25      2.5 mg         26      1.25 mg         27      2.5 mg         28      1.25 mg           29      2.5 mg         30      2.5 mg         31      1.25 mg              Date Details   07/19 This INR check               How to take your warfarin dose     To take:  1.25 mg Take 0.5 of a 2.5 mg tablet.    To take:  2.5 mg Take 1 of the 2.5 mg tablets.           August 2018 Details    Sun Mon Tue Wed Thu Fri Sat        1      2.5 mg         2      1.25 mg         3      2.5 mg         4      1.25 mg           5      2.5 mg         6      2.5 mg         7      1.25 mg         8      2.5 mg         9      1.25 mg         10      2.5  mg         11      1.25 mg           12      2.5 mg         13      2.5 mg         14      1.25 mg         15      2.5 mg         16      1.25 mg         17      2.5 mg         18      1.25 mg           19      2.5 mg         20      2.5 mg         21      1.25 mg         22      2.5 mg         23      1.25 mg         24      2.5 mg         25      1.25 mg           26      2.5 mg         27      2.5 mg         28      1.25 mg         29      2.5 mg         30            31                 Date Details   No additional details    Date of next INR:  8/30/2018         How to take your warfarin dose     To take:  1.25 mg Take 0.5 of a 2.5 mg tablet.    To take:  2.5 mg Take 1 of the 2.5 mg tablets.

## 2018-07-19 NOTE — PROGRESS NOTES
ANTICOAGULATION FOLLOW-UP CLINIC VISIT    Patient Name:  Elvia Amaro  Date:  7/19/2018  Contact Type:  Face to Face    SUBJECTIVE:     Patient Findings     Positives No Problem Findings           OBJECTIVE    INR Protime   Date Value Ref Range Status   07/19/2018 2.8 (A) 0.86 - 1.14 Final       ASSESSMENT / PLAN  INR assessment THER    Recheck INR In: 6 WEEKS    INR Location Clinic      Anticoagulation Summary as of 7/19/2018     INR goal 2.0-3.0   Today's INR 2.8   Warfarin maintenance plan 1.25 mg (2.5 mg x 0.5) on Tue, Thu, Sat; 2.5 mg (2.5 mg x 1) all other days   Full warfarin instructions 1.25 mg on Tue, Thu, Sat; 2.5 mg all other days   Weekly warfarin total 13.75 mg   No change documented Campos Carrera RN   Plan last modified Campos Carrera RN (5/10/2018)   Next INR check 8/30/2018   Target end date     Indications   Long-term (current) use of anticoagulants [Z79.01] [Z79.01]  Atrial fibrillation (H) [I48.91]         Anticoagulation Episode Summary     INR check location     Preferred lab     Send INR reminders to [facility]    Comments 2.5 mg tabs, PM dose, appt card      Anticoagulation Care Providers     Provider Role Specialty Phone number    Tej Tilley MD [facility] [phone]            See the Encounter Report to view Anticoagulation Flowsheet and Dosing Calendar (Go to Encounters tab in chart review, and find the Anticoagulation Therapy Visit)    Dosage adjustment made based on physician directed care plan.    Campos Carrera RN

## 2018-07-23 DIAGNOSIS — I48.0 PAROXYSMAL ATRIAL FIBRILLATION (H): ICD-10-CM

## 2018-07-23 DIAGNOSIS — E03.1 CONGENITAL HYPOTHYROIDISM WITHOUT GOITER: ICD-10-CM

## 2018-07-23 DIAGNOSIS — R60.0 LOCALIZED EDEMA: ICD-10-CM

## 2018-07-23 DIAGNOSIS — I50.21 ACUTE SYSTOLIC HEART FAILURE (H): ICD-10-CM

## 2018-07-23 NOTE — TELEPHONE ENCOUNTER
"Last Written Prescription Date:  3/06/2018  Last Fill Quantity: 90,  # refills: 1   Last office visit: 4/12/2018 with prescribing provider:  Dr. Tilley   Future Office Visit:    Requested Prescriptions   Pending Prescriptions Disp Refills     furosemide (LASIX) 20 MG tablet [Pharmacy Med Name: FUROSEMIDE 20 MG Tablet] 90 tablet 1     Sig: TAKE 1 TABLET EVERY DAY    Diuretics (Including Combos) Protocol Passed    7/23/2018  4:32 PM       Passed - Blood pressure under 140/90 in past 12 months    BP Readings from Last 3 Encounters:   04/12/18 126/64   04/05/18 115/52   11/30/17 124/72                Passed - Recent (12 mo) or future (30 days) visit within the authorizing provider's specialty    Patient had office visit in the last 12 months or has a visit in the next 30 days with authorizing provider or within the authorizing provider's specialty.  See \"Patient Info\" tab in inbasket, or \"Choose Columns\" in Meds & Orders section of the refill encounter.           Passed - Patient is age 18 or older       Passed - No active pregancy on record       Passed - Normal serum creatinine on file in past 12 months    Recent Labs   Lab Test  04/05/18   1224   CR  0.88             Passed - Normal serum potassium on file in past 12 months    Recent Labs   Lab Test  04/05/18   1224   POTASSIUM  3.9                   Passed - Normal serum sodium on file in past 12 months    Recent Labs   Lab Test  04/05/18   1224   NA  139             Passed - No positive pregnancy test in past 12 months          "

## 2018-07-24 RX ORDER — FUROSEMIDE 20 MG
TABLET ORAL
Qty: 90 TABLET | Refills: 1 | Status: SHIPPED | OUTPATIENT
Start: 2018-07-24 | End: 2018-10-22

## 2018-07-24 NOTE — TELEPHONE ENCOUNTER
Prescription approved per [facility] Refill Protocol.    Brandie Deutsch RN  [facility]

## 2018-08-30 ENCOUNTER — ANTICOAGULATION THERAPY VISIT (OUTPATIENT)
Dept: ANTICOAGULATION | Facility: OTHER | Age: 78
End: 2018-08-30
Payer: COMMERCIAL

## 2018-08-30 DIAGNOSIS — I48.91 ATRIAL FIBRILLATION, UNSPECIFIED TYPE (H): ICD-10-CM

## 2018-08-30 DIAGNOSIS — Z79.01 LONG-TERM (CURRENT) USE OF ANTICOAGULANTS: ICD-10-CM

## 2018-08-30 LAB — INR POINT OF CARE: 2.7 (ref 0.86–1.14)

## 2018-08-30 PROCEDURE — 85610 PROTHROMBIN TIME: CPT | Mod: QW

## 2018-08-30 PROCEDURE — 36416 COLLJ CAPILLARY BLOOD SPEC: CPT

## 2018-08-30 PROCEDURE — 99207 ZZC NO CHARGE NURSE ONLY: CPT

## 2018-08-30 NOTE — PROGRESS NOTES
ANTICOAGULATION FOLLOW-UP CLINIC VISIT    Patient Name:  Elvia Amaro  Date:  8/30/2018  Contact Type:  Face to Face    SUBJECTIVE:     Patient Findings     Positives No Problem Findings           OBJECTIVE    INR Protime   Date Value Ref Range Status   08/30/2018 2.7 (A) 0.86 - 1.14 Final       ASSESSMENT / PLAN  INR assessment THER    Recheck INR In: 6 WEEKS    INR Location Clinic      Anticoagulation Summary as of 8/30/2018     INR goal 2.0-3.0   Today's INR 2.7   Warfarin maintenance plan 1.25 mg (2.5 mg x 0.5) on Tue, Thu, Sat; 2.5 mg (2.5 mg x 1) all other days   Full warfarin instructions 1.25 mg on Tue, Thu, Sat; 2.5 mg all other days   Weekly warfarin total 13.75 mg   No change documented Campos Carrera RN   Plan last modified Campos Carrera RN (5/10/2018)   Next INR check 10/11/2018   Target end date     Indications   Long-term (current) use of anticoagulants [Z79.01] [Z79.01]  Atrial fibrillation (H) [I48.91]         Anticoagulation Episode Summary     INR check location     Preferred lab     Send INR reminders to [facility]    Comments 2.5 mg tabs, PM dose, appt card      Anticoagulation Care Providers     Provider Role Specialty Phone number    Tej Tilley MD [facility] [phone]            See the Encounter Report to view Anticoagulation Flowsheet and Dosing Calendar (Go to Encounters tab in chart review, and find the Anticoagulation Therapy Visit)    Dosage adjustment made based on physician directed care plan.    Campos Carrera RN

## 2018-08-30 NOTE — MR AVS SNAPSHOT
Elvianoel Amaro   8/30/2018 9:00 AM   Anticoagulation Therapy Visit    Description:  78 year old female   Provider:  [facility]   Department:  [facility]           INR as of 8/30/2018     Today's INR 2.7      Anticoagulation Summary as of 8/30/2018     INR goal 2.0-3.0   Today's INR 2.7   Full warfarin instructions 1.25 mg on Tue, Thu, Sat; 2.5 mg all other days   Next INR check 10/11/2018    Indications   Long-term (current) use of anticoagulants [Z79.01] [Z79.01]  Atrial fibrillation (H) [I48.91]         Your next Anticoagulation Clinic appointment(s)     Oct 11, 2018  9:00 AM CDT   [facility] with [facility]   [facility]    [address] [facility] [place] [address]   [phone]              Contact Numbers     Clinic Number:         August 2018 Details    Sun Mon Tue Wed Thu Fri Sat        1               2               3               4                 5               6               7               8               9               10               11                 12               13               14               15               16               17               18                 19               20               21               22               23               24               25                 26               27               28               29               30      1.25 mg   See details      31      2.5 mg           Date Details   08/30 This INR check               How to take your warfarin dose     To take:  1.25 mg Take 0.5 of a 2.5 mg tablet.    To take:  2.5 mg Take 1 of the 2.5 mg tablets.           September 2018 Details    Sun Mon Tue Wed Thu Fri Sat           1      1.25 mg           2      2.5 mg         3      2.5 mg         4      1.25 mg         5      2.5 mg         6      1.25 mg         7      2.5 mg         8      1.25 mg           9      2.5 mg         10      2.5 mg         11      1.25 mg         12      2.5 mg         13       1.25 mg         14      2.5 mg         15      1.25 mg           16      2.5 mg         17      2.5 mg         18      1.25 mg         19      2.5 mg         20      1.25 mg         21      2.5 mg         22      1.25 mg           23      2.5 mg         24      2.5 mg         25      1.25 mg         26      2.5 mg         27      1.25 mg         28      2.5 mg         29      1.25 mg           30      2.5 mg                Date Details   No additional details            How to take your warfarin dose     To take:  1.25 mg Take 0.5 of a 2.5 mg tablet.    To take:  2.5 mg Take 1 of the 2.5 mg tablets.           October 2018 Details    Sun Mon Tue Wed Thu Fri Sat      1      2.5 mg         2      1.25 mg         3      2.5 mg         4      1.25 mg         5      2.5 mg         6      1.25 mg           7      2.5 mg         8      2.5 mg         9      1.25 mg         10      2.5 mg         11            12               13                 14               15               16               17               18               19               20                 21               22               23               24               25               26               27                 28               29               30               31                   Date Details   No additional details    Date of next INR:  10/11/2018         How to take your warfarin dose     To take:  1.25 mg Take 0.5 of a 2.5 mg tablet.    To take:  2.5 mg Take 1 of the 2.5 mg tablets.

## 2018-10-04 ENCOUNTER — ONCOLOGY SURVIVORSHIP VISIT (OUTPATIENT)
Dept: ONCOLOGY | Facility: CLINIC | Age: 78
End: 2018-10-04
Attending: PHYSICIAN ASSISTANT
Payer: MEDICARE

## 2018-10-04 ENCOUNTER — APPOINTMENT (OUTPATIENT)
Dept: LAB | Facility: CLINIC | Age: 78
End: 2018-10-04
Attending: INTERNAL MEDICINE
Payer: MEDICARE

## 2018-10-04 ENCOUNTER — RADIANT APPOINTMENT (OUTPATIENT)
Dept: MAMMOGRAPHY | Facility: CLINIC | Age: 78
End: 2018-10-04
Payer: COMMERCIAL

## 2018-10-04 VITALS
HEART RATE: 58 BPM | TEMPERATURE: 97.8 F | WEIGHT: 201 LBS | OXYGEN SATURATION: 96 % | BODY MASS INDEX: 33.45 KG/M2 | DIASTOLIC BLOOD PRESSURE: 64 MMHG | SYSTOLIC BLOOD PRESSURE: 133 MMHG | RESPIRATION RATE: 18 BRPM

## 2018-10-04 DIAGNOSIS — Z17.1 MALIGNANT NEOPLASM OF UPPER-OUTER QUADRANT OF LEFT BREAST IN FEMALE, ESTROGEN RECEPTOR NEGATIVE (H): ICD-10-CM

## 2018-10-04 DIAGNOSIS — C50.412 MALIGNANT NEOPLASM OF UPPER-OUTER QUADRANT OF LEFT BREAST IN FEMALE, ESTROGEN RECEPTOR NEGATIVE (H): ICD-10-CM

## 2018-10-04 LAB
ALBUMIN SERPL-MCNC: 3.3 G/DL (ref 3.4–5)
ALP SERPL-CCNC: 118 U/L (ref 40–150)
ALT SERPL W P-5'-P-CCNC: 28 U/L (ref 0–50)
ANION GAP SERPL CALCULATED.3IONS-SCNC: 6 MMOL/L (ref 3–14)
AST SERPL W P-5'-P-CCNC: 23 U/L (ref 0–45)
BASOPHILS # BLD AUTO: 0 10E9/L (ref 0–0.2)
BASOPHILS NFR BLD AUTO: 0.4 %
BILIRUB SERPL-MCNC: 0.6 MG/DL (ref 0.2–1.3)
BUN SERPL-MCNC: 16 MG/DL (ref 7–30)
CALCIUM SERPL-MCNC: 9 MG/DL (ref 8.5–10.1)
CHLORIDE SERPL-SCNC: 104 MMOL/L (ref 94–109)
CO2 SERPL-SCNC: 29 MMOL/L (ref 20–32)
CREAT SERPL-MCNC: 0.99 MG/DL (ref 0.52–1.04)
DIFFERENTIAL METHOD BLD: NORMAL
EOSINOPHIL # BLD AUTO: 0.1 10E9/L (ref 0–0.7)
EOSINOPHIL NFR BLD AUTO: 2.7 %
ERYTHROCYTE [DISTWIDTH] IN BLOOD BY AUTOMATED COUNT: 13.6 % (ref 10–15)
GFR SERPL CREATININE-BSD FRML MDRD: 54 ML/MIN/1.7M2
GLUCOSE SERPL-MCNC: 90 MG/DL (ref 70–99)
HCT VFR BLD AUTO: 42.1 % (ref 35–47)
HGB BLD-MCNC: 13.8 G/DL (ref 11.7–15.7)
IMM GRANULOCYTES # BLD: 0 10E9/L (ref 0–0.4)
IMM GRANULOCYTES NFR BLD: 0.2 %
LYMPHOCYTES # BLD AUTO: 0.9 10E9/L (ref 0.8–5.3)
LYMPHOCYTES NFR BLD AUTO: 16.9 %
MCH RBC QN AUTO: 30.7 PG (ref 26.5–33)
MCHC RBC AUTO-ENTMCNC: 32.8 G/DL (ref 31.5–36.5)
MCV RBC AUTO: 94 FL (ref 78–100)
MONOCYTES # BLD AUTO: 0.4 10E9/L (ref 0–1.3)
MONOCYTES NFR BLD AUTO: 6.6 %
NEUTROPHILS # BLD AUTO: 3.9 10E9/L (ref 1.6–8.3)
NEUTROPHILS NFR BLD AUTO: 73.2 %
NRBC # BLD AUTO: 0 10*3/UL
NRBC BLD AUTO-RTO: 0 /100
PLATELET # BLD AUTO: 161 10E9/L (ref 150–450)
POTASSIUM SERPL-SCNC: 4.2 MMOL/L (ref 3.4–5.3)
PROT SERPL-MCNC: 6.8 G/DL (ref 6.8–8.8)
RBC # BLD AUTO: 4.49 10E12/L (ref 3.8–5.2)
SODIUM SERPL-SCNC: 139 MMOL/L (ref 133–144)
WBC # BLD AUTO: 5.3 10E9/L (ref 4–11)

## 2018-10-04 PROCEDURE — 85025 COMPLETE CBC W/AUTO DIFF WBC: CPT | Performed by: INTERNAL MEDICINE

## 2018-10-04 PROCEDURE — 80053 COMPREHEN METABOLIC PANEL: CPT | Performed by: INTERNAL MEDICINE

## 2018-10-04 PROCEDURE — G0463 HOSPITAL OUTPT CLINIC VISIT: HCPCS | Mod: ZF

## 2018-10-04 PROCEDURE — 36415 COLL VENOUS BLD VENIPUNCTURE: CPT

## 2018-10-04 PROCEDURE — 99215 OFFICE O/P EST HI 40 MIN: CPT | Mod: ZP | Performed by: PHYSICIAN ASSISTANT

## 2018-10-04 ASSESSMENT — PAIN SCALES - GENERAL: PAINLEVEL: NO PAIN (0)

## 2018-10-04 NOTE — LETTER
10/4/2018      RE: Elvia Amaro  [id number] [facility] [address]       REASON FOR VISIT:  I am seeing Ms. Amaro in the Cancer Survivorship Program for her diagnosis of left breast cancer.      Ms. Amaro had a left breast biopsy on 09/15/2010.  This revealed infiltrating ductal carcinoma, grade 3, ER/[place]/HER2-negative.  She had inflammatory breast cancer by exam and was diagnosed with T4 N1 M0 left breast cancer.  She received Adriamycin and Cytoxan x4 cycles from 10/19/2010 until 12/14/2010.  Total accumulation dose of the Adriamycin was 240 mg/m2.  She received Taxol from 01/04/2011 until 02/15/2011.  She had a left mastectomy with left axillary lymph node dissection on 03/21/2011.  This revealed a minute focus of invasive cancer.  Zero out of 21 lymph nodes were positive.  She received 6040 cGy of radiation to the left wall and regional lymph nodes completed on 07/08/2011.       Ms. Amaro states she is feeling fine at this time.  She does not have a formal exercise program, but she remains active.  She notes no new concerns since Dr. Padilla's last visit.     CANCER TREATMENT SUMMARY:  *Left breast biopsy on 09/15/2010.  This revealed infiltrating ductal carcinoma, grade 3, ER/[place]/HER2-negative.    *Adriamycin and Cytoxan x4 cycles from 10/19/2010 until 12/14/2010.  Total accumulation dose of the Adriamycin was 240 mg/m2.    *Taxol from 01/04/2011 until 02/15/2011.    *Left mastectomy with left axillary lymph node dissection on 03/21/2011.  This revealed a minute focus of invasive cancer.  Zero out of 21 lymph nodes were positive.    *She received 6040 cGy of radiation to the left wall and regional lymph nodes completed on 07/08/2011.      MEDICATIONS:   Current Outpatient Prescriptions   Medication Sig Dispense Refill     amiodarone (PACERONE/CODARONE) 200 MG tablet TAKE 1 TABLET EVERY DAY 90 tablet 3     Calcium Carbonate-Vitamin D (CALCIUM + D PO) Take  by mouth.       carvedilol (COREG) 12.5 MG  tablet TAKE 1 TABLET TWICE DAILY WITH MEALS 180 tablet 3     Cholecalciferol (VITAMIN D-1000 MAX ST PO) Take  by mouth.       furosemide (LASIX) 20 MG tablet TAKE 1 TABLET EVERY DAY 90 tablet 1     ketorolac tromethamine (ACULAR-LS) 0.4 % SOLN ophthalmic solution Apply 1 drop to eye       ketorolac tromethamine (ACULAR-LS) 0.4 % SOLN ophthalmic solution        levothyroxine (SYNTHROID/LEVOTHROID) 100 MCG tablet Take 1 tablet (100 mcg) by mouth daily 90 tablet 3     meloxicam (MOBIC) 7.5 MG tablet TAKE 1 TABLET BY MOUTH EVER Y DAY 90 tablet 3     ofloxacin (OCUFLOX) 0.3 % ophthalmic solution Apply 1-2 drops to eye       ofloxacin (OCUFLOX) 0.3 % ophthalmic solution        order for DME Equipment being ordered: breast prosthesis and bras 1 Device 0     order for DME Equipment being ordered: Elastic Sleeve   Prosthetics Fax [phone] 1 Device 1     prednisoLONE acetate (PRED FORTE) 1 % ophthalmic susp Apply 1-2 drops to eye       prednisoLONE acetate (PRED FORTE) 1 % ophthalmic susp        warfarin (COUMADIN) 2.5 MG tablet Take 1.25 mg (1/2 tab) Tues, Thurs, Sat and 2.5 mg (1 tab) all other days or as directed by coumadin clinic 80 tablet 1     ACE/ARB NOT PRESCRIBED, INTENTIONAL, 1 each every 15 minutes ACE & ARB not prescribed due to Symptomatic hypotension not due to excessive diuresis (Patient not taking: Reported on 10/4/2018)         ALLERGIES:   Allergies   Allergen Reactions     Bacitracin      Skin Irritation     Cephalexin Hcl Itching     Clindamycin Hcl Itching     PHYSICAL EXAM:  Vitals: /64 (BP Location: Right arm, Patient Position: Sitting, Cuff Size: Adult Regular)  Pulse 58  Temp 97.8  F (36.6  C) (Oral)  Resp 18  Wt 91.2 kg (201 lb)  SpO2 96%  BMI 33.45 kg/m2  GENERAL:  A pleasant person in no acute distress.   HEENT:  Sclerae are nonicteric.    NECK:  Supple.   LYMPH NODES:  No peripheral lymphadenopathy noted in the axillary, supraclavicular, or cervical regions.   LUNGS:  Clear  to auscultation bilaterally.   HEART:  Regular rate and rhythm, with no murmur appreciated.   ABDOMEN:  Bowel sounds are active.  Soft and nontender.  No hepatosplenomegaly or other masses appreciated.  LOWER EXTREMITIES:  Without pitting edema to the knees bilaterally.   NEUROLOGICAL:  Alert/orientated/able to answer all questions.  CN grossly intact.  PSYCH:  Normal affect.  SKIN:  No suspicious lesions on areas of exposed skin.  BREAST:  Right chest wall, well healed mastectomy incision, telangiectasias, no masses.  Left breast, normal to inspections, no masses.    ASSESSMENT/PLAN:  I had  the pleasure of seeing Ms. Amaro in the Cancer Survivorship Program for her diagnosis of left breast cancer.  Given her diagnosis and treatment, I gave her the following recommendations.    1.  Locally advanced (T4N1M0), infiltrating ductal carcinoma of the left breast, triple-negative, treated as above with chemotherapy, surgery and radiation.  Ms. Amaro continues to do well at this time.  She does not have any signs or symptoms that would suggest recurrence of her breast carcinoma.  She is now 8 years out from the diagnosis of her breast cancer without evidence of active disease.  She wishes to be seen on a yearly basis, and I  will move Dr. Padilla's appointment out to 10/2019, when she is due for her mammogram.    2.  Lymphedema.  She is at risk for lymphedema.  She has had mild lymphedema since her treatment for her breast cancer which is stable.  If her lymphedema worsens, she will contact the clinic for a referral to the lymphedema specialists.    3.  Bladder complications.  With exposure to Cytoxan, she should report any hematuria or change in urination.    4.  Cardiac complications.  She did receive Adriamycin and left chest wall radiation which puts her at risk for cardiac complications in the future.  She does see a cardiologist, and imaging will be per Cardiology.  She will continue to have aggressive screening and  management of her cholesterol, blood pressure and glucose.  She should refrain from tobacco.  She should be exercising 150 minutes of cardiovascular exercise per week.    5.  Peripheral neuropathy.  She denies any peripheral neuropathy with the chemotherapy and should not see this as a late effect.    6.  Cognitive changes.  She denies any problems with memory or concentration with the treatment.    7.  Bone marrow.  She is at small risk for MDS or leukemia given the exposure to the chemotherapy.  This risk goes out to 10 years from completion of the chemotherapy, so out to 2020.  CBC from today showed no concerns.    8.  Other radiation effects.  There was a portion of the lung in the field of radiation, but as long as she is not having hemoptysis, shortness of breath or cough, no chest imaging is warranted.  Bones are at risk for fractures in the area of the radiation.  She will watch for any new skin lesions in the area of the radiation and, if present, have them evaluated.    9.  Cancer screening.  She does need to continue to undergo cancer screening for women of her age group.  She has had a hysterectomy with BSO.  Pelvic exams will be per her primary care provider.  She had a colonoscopy in 2010 and will discuss whether she needs a followup colonoscopy with her primary care provider.  She should limit her sun exposure and use sunscreens.    10.  Healthy lifestyle.  She should refrain from tobacco.  Her BMI should be between 20 and 25.  She has changed her portion sizes and has been losing weight on purpose.  I did congratulate her on this.  She should be exercising 150 minutes of cardiovascular exercise per week.  She will continue to see her primary care provider for general health maintenance.  She should see her eye doctor and dentist routinely.  I recommend the annual influenza vaccine.       If there are no interval concerns, the patient will be seen in a year when she is due for her mammogram.     I  spent 40 minutes with this patient, over 50% in counseling.      Megan Rodríguez PA-C

## 2018-10-04 NOTE — NURSING NOTE
Chief Complaint   Patient presents with     Blood Draw     Labs drawn via  by RN. VS taken.     Cristine Null RN

## 2018-10-04 NOTE — PROGRESS NOTES
REASON FOR VISIT:  I am seeing Ms. Amaro in the Cancer Survivorship Program for her diagnosis of left breast cancer.      Ms. Amaro had a left breast biopsy on 09/15/2010.  This revealed infiltrating ductal carcinoma, grade 3, ER/NC/HER2-negative.  She had inflammatory breast cancer by exam and was diagnosed with T4 N1 M0 left breast cancer.  She received Adriamycin and Cytoxan x4 cycles from 10/19/2010 until 12/14/2010.  Total accumulation dose of the Adriamycin was 240 mg/m2.  She received Taxol from 01/04/2011 until 02/15/2011.  She had a left mastectomy with left axillary lymph node dissection on 03/21/2011.  This revealed a minute focus of invasive cancer.  Zero out of 21 lymph nodes were positive.  She received 6040 cGy of radiation to the left wall and regional lymph nodes completed on 07/08/2011.       Ms. Amaro states she is feeling fine at this time.  She does not have a formal exercise program, but she remains active.  She notes no new concerns since Dr. Padilla's last visit.     CANCER TREATMENT SUMMARY:  *Left breast biopsy on 09/15/2010.  This revealed infiltrating ductal carcinoma, grade 3, ER/NC/HER2-negative.    *Adriamycin and Cytoxan x4 cycles from 10/19/2010 until 12/14/2010.  Total accumulation dose of the Adriamycin was 240 mg/m2.    *Taxol from 01/04/2011 until 02/15/2011.    *Left mastectomy with left axillary lymph node dissection on 03/21/2011.  This revealed a minute focus of invasive cancer.  Zero out of 21 lymph nodes were positive.    *She received 6040 cGy of radiation to the left wall and regional lymph nodes completed on 07/08/2011.      MEDICATIONS:   Current Outpatient Prescriptions   Medication Sig Dispense Refill     amiodarone (PACERONE/CODARONE) 200 MG tablet TAKE 1 TABLET EVERY DAY 90 tablet 3     Calcium Carbonate-Vitamin D (CALCIUM + D PO) Take  by mouth.       carvedilol (COREG) 12.5 MG tablet TAKE 1 TABLET TWICE DAILY WITH MEALS 180 tablet 3     Cholecalciferol (VITAMIN  D-1000 MAX ST PO) Take  by mouth.       furosemide (LASIX) 20 MG tablet TAKE 1 TABLET EVERY DAY 90 tablet 1     ketorolac tromethamine (ACULAR-LS) 0.4 % SOLN ophthalmic solution Apply 1 drop to eye       ketorolac tromethamine (ACULAR-LS) 0.4 % SOLN ophthalmic solution        levothyroxine (SYNTHROID/LEVOTHROID) 100 MCG tablet Take 1 tablet (100 mcg) by mouth daily 90 tablet 3     meloxicam (MOBIC) 7.5 MG tablet TAKE 1 TABLET BY MOUTH EVER Y DAY 90 tablet 3     ofloxacin (OCUFLOX) 0.3 % ophthalmic solution Apply 1-2 drops to eye       ofloxacin (OCUFLOX) 0.3 % ophthalmic solution        order for DME Equipment being ordered: breast prosthesis and bras 1 Device 0     order for DME Equipment being ordered: Elastic Sleeve   Prosthetics Fax [phone] 1 Device 1     prednisoLONE acetate (PRED FORTE) 1 % ophthalmic susp Apply 1-2 drops to eye       prednisoLONE acetate (PRED FORTE) 1 % ophthalmic susp        warfarin (COUMADIN) 2.5 MG tablet Take 1.25 mg (1/2 tab) Tues, Thurs, Sat and 2.5 mg (1 tab) all other days or as directed by coumadin clinic 80 tablet 1     ACE/ARB NOT PRESCRIBED, INTENTIONAL, 1 each every 15 minutes ACE & ARB not prescribed due to Symptomatic hypotension not due to excessive diuresis (Patient not taking: Reported on 10/4/2018)         ALLERGIES:   Allergies   Allergen Reactions     Bacitracin      Skin Irritation     Cephalexin Hcl Itching     Clindamycin Hcl Itching     PHYSICAL EXAM:  Vitals: /64 (BP Location: Right arm, Patient Position: Sitting, Cuff Size: Adult Regular)  Pulse 58  Temp 97.8  F (36.6  C) (Oral)  Resp 18  Wt 91.2 kg (201 lb)  SpO2 96%  BMI 33.45 kg/m2  GENERAL:  A pleasant person in no acute distress.   HEENT:  Sclerae are nonicteric.    NECK:  Supple.   LYMPH NODES:  No peripheral lymphadenopathy noted in the axillary, supraclavicular, or cervical regions.   LUNGS:  Clear to auscultation bilaterally.   HEART:  Regular rate and rhythm, with no murmur  appreciated.   ABDOMEN:  Bowel sounds are active.  Soft and nontender.  No hepatosplenomegaly or other masses appreciated.  LOWER EXTREMITIES:  Without pitting edema to the knees bilaterally.   NEUROLOGICAL:  Alert/orientated/able to answer all questions.  CN grossly intact.  PSYCH:  Normal affect.  SKIN:  No suspicious lesions on areas of exposed skin.  BREAST:  Right chest wall, well healed mastectomy incision, telangiectasias, no masses.  Left breast, normal to inspections, no masses.    ASSESSMENT/PLAN:  I had  the pleasure of seeing Ms. Amaro in the Cancer Survivorship Program for her diagnosis of left breast cancer.  Given her diagnosis and treatment, I gave her the following recommendations.    1.  Locally advanced (T4N1M0), infiltrating ductal carcinoma of the left breast, triple-negative, treated as above with chemotherapy, surgery and radiation.  Ms. Amaro continues to do well at this time.  She does not have any signs or symptoms that would suggest recurrence of her breast carcinoma.  She is now 8 years out from the diagnosis of her breast cancer without evidence of active disease.  She wishes to be seen on a yearly basis, and I  will move Dr. Padilla's appointment out to 10/2019, when she is due for her mammogram.    2.  Lymphedema.  She is at risk for lymphedema.  She has had mild lymphedema since her treatment for her breast cancer which is stable.  If her lymphedema worsens, she will contact the clinic for a referral to the lymphedema specialists.    3.  Bladder complications.  With exposure to Cytoxan, she should report any hematuria or change in urination.    4.  Cardiac complications.  She did receive Adriamycin and left chest wall radiation which puts her at risk for cardiac complications in the future.  She does see a cardiologist, and imaging will be per Cardiology.  She will continue to have aggressive screening and management of her cholesterol, blood pressure and glucose.  She should refrain  from tobacco.  She should be exercising 150 minutes of cardiovascular exercise per week.    5.  Peripheral neuropathy.  She denies any peripheral neuropathy with the chemotherapy and should not see this as a late effect.    6.  Cognitive changes.  She denies any problems with memory or concentration with the treatment.    7.  Bone marrow.  She is at small risk for MDS or leukemia given the exposure to the chemotherapy.  This risk goes out to 10 years from completion of the chemotherapy, so out to 2020.  CBC from today showed no concerns.    8.  Other radiation effects.  There was a portion of the lung in the field of radiation, but as long as she is not having hemoptysis, shortness of breath or cough, no chest imaging is warranted.  Bones are at risk for fractures in the area of the radiation.  She will watch for any new skin lesions in the area of the radiation and, if present, have them evaluated.    9.  Cancer screening.  She does need to continue to undergo cancer screening for women of her age group.  She has had a hysterectomy with BSO.  Pelvic exams will be per her primary care provider.  She had a colonoscopy in 2010 and will discuss whether she needs a followup colonoscopy with her primary care provider.  She should limit her sun exposure and use sunscreens.    10.  Healthy lifestyle.  She should refrain from tobacco.  Her BMI should be between 20 and 25.  She has changed her portion sizes and has been losing weight on purpose.  I did congratulate her on this.  She should be exercising 150 minutes of cardiovascular exercise per week.  She will continue to see her primary care provider for general health maintenance.  She should see her eye doctor and dentist routinely.  I recommend the annual influenza vaccine.       If there are no interval concerns, the patient will be seen in a year when she is due for her mammogram.     I spent 40 minutes with this patient, over 50% in counseling.

## 2018-10-04 NOTE — LETTER
October 5, 2018      TO: Elvia WOMACK Sondra  [address] [address] [place] [id number]         Dear MsEdwin Elvia Amaro,    It was my pleasure seeing you in the Cancer Survivorship Clinic.   Due to your cancer treatment, here are considerations/recommendations based on your treatment:    Follow up for your cancer.  You will be seen yearly with your mammogram.    Lymphedema risk. You have lymphedema. Please contact the clinic if you notice change in edema (swelling) for a referral to the lymphedema specialists.    Peripheral neuropathy.  You did not have numbness and/or tingling in your hands and/or feet from the chemotherapy. You should not see this as a late effect.    Heart.  There maybe late effects from your treatment that affect your heart.  You need to see your primary care provider for aggressive screening and if needed treatment of cholesterol, blood pressure and glucose.  You should exercise 150 minutes of cardiovascular exercise per week.  You should refrain from tobacco.      Due to exposure to Cytoxan (cyclophosphamide), please report painful urination, blood in your urine, urinary frequency or urinary urgency to your care team.    Risk of developing a blood cancer. Given the exposure to certain chemotherapy agents, you are at risk of MDS (bone marrow disorder) or leukemia.  You could have a CBC (complete blood count) done yearly until 10 years out from completion of your chemotherapy.      Radiation effects.  If there are new skin lesions in the area of radiation, please have them evaluated.  Bones in the area of radiation are at risk for fractures.  Please report new/change in cough, coughing up blood, new/change in breathing to your PCP or oncology/hematology team.      Cancer screening.  You should undergo routine screening for your age group.   Given your surgical history, routine pap and pelvic examinations as directed by your PCP or GYN.   Colorectal cancer screening begins at age 50 with a  colonoscopy, then as directed.  You should limit your sun exposure and use sunscreens.    Healthy Lifestyle.  Do not use tobacco in any form. Maintain a healthy weight with a BMI 20-25.  Diet should include lots of fruits and vegetables, low fat.  If you are struggling with your diet, please ask about a referral to Nutrition.  Your exercise program should be 150 minutes of cardiovascular exercise per week. You should have regular check-ups with your primary care provider for general health maintenance.  You should receive the annual influenza vaccine, unless contraindicated. You should see your eye doctor and dentist routinely.    If you have any questions, please feel free to contact me at [phone].      Sincerely,            Megan Rodríguez PA-C

## 2018-10-04 NOTE — MR AVS SNAPSHOT
After Visit Summary   10/4/2018    Elvia Amaro    MRN: [id number]           Patient Information     Date Of Birth          1940        Visit Information        Provider Department      10/4/2018 2:00 PM Megan Rodríguez PA-C [facility]        Today's Diagnoses     Malignant neoplasm of upper-outer quadrant of left breast in female, estrogen receptor negative (H)           Follow-ups after your visit        Your next 10 appointments already scheduled     Oct 11, 2018  9:00 AM CDT   [facility]    [address] [facility] [id number] [phone]            Oct 22, 2018 10:40 AM CDT   Office Visit with Tej Tilley MD   [facility]    [address] [facility] [id number] [phone]           Bring a current list of meds and any records pertaining to this visit. For Physicals, please bring immunization records and any forms needing to be filled out. Please arrive 10 minutes early to complete paperwork.              Who to contact     If you have questions or need follow up information about today's clinic visit or your schedule please contact [facility] directly at [phone].  Normal or non-critical lab and imaging results will be communicated to you by MyChart, letter or phone within 4 business days after the clinic has received the results. If you do not hear from us within 7 days, please contact the clinic through MyChart or phone. If you have a critical or abnormal lab result, we will notify you by phone as soon as possible.  Submit refill requests through [employer] or call your pharmacy and they will forward the refill request to us. Please allow 3 business days for your refill to be completed.          Additional Information About Your Visit        Care EveryWhere ID     This is your Care EveryWhere ID. This could be  used by other organizations to access your [facility] medical records  [id number]        Your Vitals Were     Pulse Temperature Respirations Pulse Oximetry BMI (Body Mass Index)       58 97.8  F (36.6  C) (Oral) 18 96% 33.45 kg/m2        Blood Pressure from Last 3 Encounters:   10/04/18 133/64   04/12/18 126/64   04/05/18 115/52    Weight from Last 3 Encounters:   10/04/18 91.2 kg (201 lb)   04/12/18 93 kg (205 lb)   04/05/18 95.3 kg (210 lb)              We Performed the Following     CBC with platelets differential     Comprehensive metabolic panel        Primary Care Provider Office Phone # Fax #    Tej Tilley [phone]       [address] [facility] [address]        Equal Access to Services     SEGUN VALLES : Shamar carney Solucy, waaxda luqadaha, qaybta kaalmada normyasapphire, enoch florentino . So [facility] [phone].    ATENCIÓN: Si habla español, tiene a good disposición servicios gratuitos de asistencia lingüística. Llame al [phone].    We comply with applicable federal civil rights laws and [place] laws. We do not discriminate on the basis of race, color, national origin, age, disability, sex, sexual orientation, or gender identity.            Thank you!     Thank you for choosing [facility]  for your care. Our goal is always to provide you with excellent care. Hearing back from our patients is one way we can continue to improve our services. Please take a few minutes to complete the written survey that you may receive in the mail after your visit with us. Thank you!             Your Updated Medication List - Protect others around you: Learn how to safely use, store and throw away your medicines at www.disposemymeds.org.          This list is accurate as of 10/4/18 11:59 PM.  Always use your most recent med list.                   Brand Name Dispense Instructions for use Diagnosis    ACE/ARB/ARNI NOT PRESCRIBED (INTENTIONAL)      1  each every 15 minutes ACE & ARB not prescribed due to Symptomatic hypotension not due to excessive diuresis    Atrial fibrillation, unspecified       amiodarone 200 MG tablet    PACERONE/CODARONE    90 tablet    TAKE 1 TABLET EVERY DAY    Atrial fibrillation, unspecified type (H)       CALCIUM + D PO      Take  by mouth.        carvedilol 12.5 MG tablet    COREG    180 tablet    TAKE 1 TABLET TWICE DAILY WITH MEALS    Cardiomyopathy (H)       furosemide 20 MG tablet    LASIX    90 tablet    TAKE 1 TABLET EVERY DAY    Localized edema, Congenital hypothyroidism without goiter, Acute systolic heart failure (H), Paroxysmal atrial fibrillation (H)       * ketorolac tromethamine 0.4 % Soln ophthalmic solution    ACULAR-LS     Apply 1 drop to eye        * ketorolac tromethamine 0.4 % Soln ophthalmic solution    ACULAR-LS          levothyroxine 100 MCG tablet    SYNTHROID/LEVOTHROID    90 tablet    Take 1 tablet (100 mcg) by mouth daily    Congenital hypothyroidism without goiter, Localized edema, Acute systolic heart failure (H), Paroxysmal atrial fibrillation (H)       meloxicam 7.5 MG tablet    MOBIC    90 tablet    TAKE 1 TABLET BY MOUTH EVER Y DAY    Degeneration of lumbar or lumbosacral intervertebral disc       * ofloxacin 0.3 % ophthalmic solution    OCUFLOX     Apply 1-2 drops to eye        * ofloxacin 0.3 % ophthalmic solution    OCUFLOX          * order for DME     1 Device    Equipment being ordered: Elastic Sleeve  Prosthetics Fax [phone]    Malignant neoplasm of left female breast, unspecified site of breast       * order for DME     1 Device    Equipment being ordered: breast prosthesis and bras    S/P left mastectomy, Malignant neoplasm of left female breast, unspecified site of breast       * prednisoLONE acetate 1 % ophthalmic susp    PRED FORTE     Apply 1-2 drops to eye        * prednisoLONE acetate 1 % ophthalmic susp    PRED FORTE          VITAMIN D-1000 MAX ST PO      Take  by mouth.         warfarin 2.5 MG tablet    COUMADIN    80 tablet    Take 1.25 mg (1/2 tab) Tues, Thurs, Sat and 2.5 mg (1 tab) all other days or as directed by coumadin clinic    Paroxysmal atrial fibrillation (H), Long term current use of anticoagulant therapy       * Notice:  This list has 8 medication(s) that are the same as other medications prescribed for you. Read the directions carefully, and ask your doctor or other care provider to review them with you.

## 2018-10-04 NOTE — NURSING NOTE
"Oncology Rooming Note    October 4, 2018 1:57 PM   Elvia Amaro is a 78 year old female who presents for:    Chief Complaint   Patient presents with     Blood Draw     Labs drawn via  by RN. VS taken.     Oncology Clinic Visit     Return - Survivorship - breast ca      Initial Vitals: /64 (BP Location: Right arm, Patient Position: Sitting, Cuff Size: Adult Regular)  Pulse 58  Temp 97.8  F (36.6  C) (Oral)  Resp 18  Wt 91.2 kg (201 lb)  SpO2 96%  BMI 33.45 kg/m2 Estimated body mass index is 33.45 kg/(m^2) as calculated from the following:    Height as of 4/12/18: 1.651 m (5' 5\").    Weight as of this encounter: 91.2 kg (201 lb). Body surface area is 2.05 meters squared.  No Pain (0) Comment: Data Unavailable   No LMP recorded. Patient is postmenopausal.  Allergies reviewed: Yes  Medications reviewed: Yes    Medications: Medication refills not needed today.  Pharmacy name entered into EPIC:    [id number] - [place], [place] - [address] [place] [address] PHARMACY MAIL DELIVERY - [facility] [address]    Clinical concerns: no new concerns      6 minutes for nursing intake (face to face time)     Stephanie Martinez CMA            "

## 2018-10-11 ENCOUNTER — ANTICOAGULATION THERAPY VISIT (OUTPATIENT)
Dept: ANTICOAGULATION | Facility: OTHER | Age: 78
End: 2018-10-11
Payer: COMMERCIAL

## 2018-10-11 DIAGNOSIS — I48.91 ATRIAL FIBRILLATION, UNSPECIFIED TYPE (H): ICD-10-CM

## 2018-10-11 LAB — INR POINT OF CARE: 2 (ref 0.86–1.14)

## 2018-10-11 PROCEDURE — 85610 PROTHROMBIN TIME: CPT | Mod: QW

## 2018-10-11 PROCEDURE — 36416 COLLJ CAPILLARY BLOOD SPEC: CPT

## 2018-10-11 PROCEDURE — 99207 ZZC NO CHARGE NURSE ONLY: CPT

## 2018-10-11 NOTE — MR AVS SNAPSHOT
Elvia SHANTA Amaro   10/11/2018 9:00 AM   Anticoagulation Therapy Visit    Description:  78 year old female   Provider:  STACIE ANTI COAG   Department:  [facility]           INR as of 10/11/2018     Today's INR 2.0      Anticoagulation Summary as of 10/11/2018     INR goal 2.0-3.0   Today's INR 2.0   Full warfarin instructions 1.25 mg on Tue, Thu, Sat; 2.5 mg all other days   Next INR check 11/22/2018    Indications   Long-term (current) use of anticoagulants [Z79.01] [Z79.01]  Atrial fibrillation (H) [I48.91]         Contact Numbers     Clinic Number:         October 2018 Details    Sun Mon Tue Wed Thu Fri Sat      1               2               3               4               5               6                 7               8               9               10               11      1.25 mg   See details      12      2.5 mg         13      1.25 mg           14      2.5 mg         15      2.5 mg         16      1.25 mg         17      2.5 mg         18      1.25 mg         19      2.5 mg         20      1.25 mg           21      2.5 mg         22      2.5 mg         23      1.25 mg         24      2.5 mg         25      1.25 mg         26      2.5 mg         27      1.25 mg           28      2.5 mg         29      2.5 mg         30      1.25 mg         31      2.5 mg             Date Details   10/11 This INR check               How to take your warfarin dose     To take:  1.25 mg Take 0.5 of a 2.5 mg tablet.    To take:  2.5 mg Take 1 of the 2.5 mg tablets.           November 2018 Details    Sun Mon Tue Wed Thu Fri Sat         1      1.25 mg         2      2.5 mg         3      1.25 mg           4      2.5 mg         5      2.5 mg         6      1.25 mg         7      2.5 mg         8      1.25 mg         9      2.5 mg         10      1.25 mg           11      2.5 mg         12      2.5 mg         13      1.25 mg         14      2.5 mg         15      1.25 mg         16      2.5 mg         17      1.25 mg            18      2.5 mg         19      2.5 mg         20      1.25 mg         21      2.5 mg         22            23               24                 25               26               27               28               29               30                 Date Details   No additional details    Date of next INR:  11/22/2018         How to take your warfarin dose     To take:  1.25 mg Take 0.5 of a 2.5 mg tablet.    To take:  2.5 mg Take 1 of the 2.5 mg tablets.

## 2018-10-11 NOTE — PROGRESS NOTES
ANTICOAGULATION FOLLOW-UP CLINIC VISIT    Patient Name:  Elvia Amaro  Date:  10/11/2018  Contact Type:  Face to Face    SUBJECTIVE:     Patient Findings     Positives No Problem Findings    Comments Pt states that she will be moving to [place], [place] around the end of the month. I will touch base with her in December to make sure she has transferred her care. Campos Carrera RN, BSN             OBJECTIVE    INR Protime   Date Value Ref Range Status   10/11/2018 2.0 (A) 0.86 - 1.14 Final       ASSESSMENT / PLAN  INR assessment THER    Recheck INR In: 6 WEEKS    INR Location Clinic      Anticoagulation Summary as of 10/11/2018     INR goal 2.0-3.0   Today's INR 2.0   Warfarin maintenance plan 1.25 mg (2.5 mg x 0.5) on Tue, Thu, Sat; 2.5 mg (2.5 mg x 1) all other days   Full warfarin instructions 1.25 mg on Tue, Thu, Sat; 2.5 mg all other days   Weekly warfarin total 13.75 mg   No change documented Campos Carrera RN   Plan last modified Campos Carrera RN (5/10/2018)   Next INR check 11/22/2018   Target end date     Indications   Long-term (current) use of anticoagulants [Z79.01] [Z79.01]  Atrial fibrillation (H) [I48.91]         Anticoagulation Episode Summary     INR check location     Preferred lab     Send INR reminders to [facility]    Comments 2.5 mg tabs, PM dose, appt card      Anticoagulation Care Providers     Provider Role Specialty Phone number    Tej Tilley MD [facility] [phone]            See the Encounter Report to view Anticoagulation Flowsheet and Dosing Calendar (Go to Encounters tab in chart review, and find the Anticoagulation Therapy Visit)    Dosage adjustment made based on physician directed care plan.    Campos Carrera RN

## 2018-10-22 ENCOUNTER — OFFICE VISIT (OUTPATIENT)
Dept: FAMILY MEDICINE | Facility: OTHER | Age: 78
End: 2018-10-22
Payer: COMMERCIAL

## 2018-10-22 VITALS
DIASTOLIC BLOOD PRESSURE: 70 MMHG | TEMPERATURE: 97.1 F | OXYGEN SATURATION: 95 % | SYSTOLIC BLOOD PRESSURE: 120 MMHG | BODY MASS INDEX: 32.95 KG/M2 | WEIGHT: 198 LBS | HEART RATE: 64 BPM | RESPIRATION RATE: 22 BRPM

## 2018-10-22 DIAGNOSIS — I10 HYPERTENSION GOAL BP (BLOOD PRESSURE) < 140/90: ICD-10-CM

## 2018-10-22 DIAGNOSIS — E66.01 MORBID OBESITY (H): ICD-10-CM

## 2018-10-22 DIAGNOSIS — I42.9 CARDIOMYOPATHY, UNSPECIFIED TYPE (H): ICD-10-CM

## 2018-10-22 DIAGNOSIS — Z13.6 CARDIOVASCULAR SCREENING; LDL GOAL LESS THAN 130: ICD-10-CM

## 2018-10-22 DIAGNOSIS — Z23 NEED FOR PROPHYLACTIC VACCINATION AND INOCULATION AGAINST INFLUENZA: ICD-10-CM

## 2018-10-22 DIAGNOSIS — E03.1 CONGENITAL HYPOTHYROIDISM WITHOUT GOITER: Primary | ICD-10-CM

## 2018-10-22 DIAGNOSIS — I50.21 ACUTE SYSTOLIC HEART FAILURE (H): ICD-10-CM

## 2018-10-22 DIAGNOSIS — R60.0 LOCALIZED EDEMA: ICD-10-CM

## 2018-10-22 DIAGNOSIS — I48.0 PAROXYSMAL ATRIAL FIBRILLATION (H): ICD-10-CM

## 2018-10-22 LAB
T4 FREE SERPL-MCNC: 1.62 NG/DL (ref 0.76–1.46)
TSH SERPL DL<=0.005 MIU/L-ACNC: 8.89 MU/L (ref 0.4–4)

## 2018-10-22 PROCEDURE — G0008 ADMIN INFLUENZA VIRUS VAC: HCPCS | Performed by: FAMILY MEDICINE

## 2018-10-22 PROCEDURE — 99214 OFFICE O/P EST MOD 30 MIN: CPT | Mod: 25 | Performed by: FAMILY MEDICINE

## 2018-10-22 PROCEDURE — 36415 COLL VENOUS BLD VENIPUNCTURE: CPT | Performed by: FAMILY MEDICINE

## 2018-10-22 PROCEDURE — 84439 ASSAY OF FREE THYROXINE: CPT | Performed by: FAMILY MEDICINE

## 2018-10-22 PROCEDURE — 84443 ASSAY THYROID STIM HORMONE: CPT | Performed by: FAMILY MEDICINE

## 2018-10-22 PROCEDURE — 90662 IIV NO PRSV INCREASED AG IM: CPT | Performed by: FAMILY MEDICINE

## 2018-10-22 RX ORDER — FUROSEMIDE 20 MG
TABLET ORAL
Qty: 90 TABLET | Refills: 3 | Status: SHIPPED | OUTPATIENT
Start: 2018-10-22 | End: 2019-02-06

## 2018-10-22 RX ORDER — CARVEDILOL 12.5 MG/1
TABLET ORAL
Qty: 180 TABLET | Refills: 3 | Status: SHIPPED | OUTPATIENT
Start: 2018-10-22 | End: 2019-01-23

## 2018-10-22 ASSESSMENT — PAIN SCALES - GENERAL: PAINLEVEL: SEVERE PAIN (6)

## 2018-10-22 NOTE — PROGRESS NOTES
SUBJECTIVE:   Elvia Amaro is a 78 year old female who presents to clinic today for the following health issues:        Hypothyroidism Follow-up      Since last visit, patient describes the following symptoms: Weight stable, no hair loss, no skin changes, no constipation, no loose stools      Amount of exercise or physical activity: None    Problems taking medications regularly: No    Medication side effects: none    Diet: regular (no restrictions)      Joint Pain    Onset: 3 weeks    Description:   Location: left knee  Character: Pulling pain    Intensity: mild, moderate    Progression of Symptoms: same    Accompanying Signs & Symptoms:  Other symptoms: radiation of pain to down into leg.     History:   Previous similar pain: no       Precipitating factors:   Trauma or overuse: no     Alleviating factors:  Improved by: rest/inactivity    Therapies Tried and outcome: rest seems to help            Problem list and histories reviewed & adjusted, as indicated.  Additional history: She and  are relocating to [place], [place]. They have been packing up their home and moving over the last 2 months. She will be establishing primary care in [place]. She will continue to follow up with Oncology at [facility]. She may need to establish with Cardiology as well.    Patient Active Problem List   Diagnosis     Endometrial hyperplasia     CARDIOVASCULAR SCREENING; LDL GOAL LESS THAN 130     Hypertension goal BP (blood pressure) < 140/90     Advanced directives, counseling/discussion     Skin exam, screening for cancer     Atrial fibrillation (H)     Acute systolic heart failure (H)     Paroxysmal atrial fibrillation (H)     Congenital hypothyroidism without goiter     Edema     Long-term (current) use of anticoagulants [Z79.01]     Breast cancer of upper-outer quadrant of left female breast (H)     Morbid obesity (H)     Counseling regarding advanced directives     Past Surgical History:   Procedure Laterality Date      ANESTHESIA CARDIOVERSION N/A 9/18/2015    Procedure: ANESTHESIA CARDIOVERSION;  Surgeon: GENERIC ANESTHESIA PROVIDER;  Location: [facility] OR     BIOPSY SKIN (LOCATION)      left leg     BREAST SURGERY       COLONOSCOPY  8/29/2011    Procedure:COLONOSCOPY; colonoscopy; Surgeon:VICKIE DUFF; Location:[facility]     HC HYSTEROSCOPY W ENDOMETRIAL BX/POLYPECTOMY W/WO D&C  9/30/2005    Hysteroscopy. D&C. Cervical biopsies.     HC REMOVAL OF TONSILS,<13 Y/O      Tonsils <10y.o.     LAPAROSCOPIC HYSTERECTOMY TOTAL, BILATERAL SALPINGO-OOPHORECTOMY, COMBINED  3/21/11     MASTECTOMY  3/21/11    left mastectomy, lymph node dissection     MOHS MICROGRAPHIC PROCEDURE       NO HISTORY OF SURGERY       REMOVE CATHETER VASCULAR ACCESS  5/4/2011    Procedure:REMOVE CATHETER VASCULAR ACCESS; right subclavian; Surgeon:ESTER ROPER; Location:[facility] OR     VASCULAR SURGERY      port       Social History   Substance Use Topics     Smoking status: Never Smoker     Smokeless tobacco: Never Used     Alcohol use No     Family History   Problem Relation Age of Onset     Diabetes Father      Cerebrovascular Disease Father      HEART DISEASE Mother      MI age 68     Cancer No family hx of      no skin cancer         Current Outpatient Prescriptions   Medication Sig Dispense Refill     ACE/ARB NOT PRESCRIBED, INTENTIONAL, 1 each every 15 minutes ACE & ARB not prescribed due to Symptomatic hypotension not due to excessive diuresis       amiodarone (PACERONE/CODARONE) 200 MG tablet TAKE 1 TABLET EVERY DAY 90 tablet 3     Calcium Carbonate-Vitamin D (CALCIUM + D PO) Take  by mouth.       carvedilol (COREG) 12.5 MG tablet TAKE 1 TABLET TWICE DAILY WITH MEALS 180 tablet 3     Cholecalciferol (VITAMIN D-1000 MAX ST PO) Take  by mouth.       furosemide (LASIX) 20 MG tablet TAKE 1 TABLET EVERY DAY 90 tablet 1     levothyroxine (SYNTHROID/LEVOTHROID) 100 MCG tablet Take 1 tablet (100 mcg) by mouth daily 90 tablet 3     order for DME Equipment being ordered: breast  prosthesis and bras 1 Device 0     order for DME Equipment being ordered: Elastic Sleeve   Prosthetics Fax [phone] 1 Device 1     warfarin (COUMADIN) 2.5 MG tablet Take 1.25 mg (1/2 tab) Tues, Thurs, Sat and 2.5 mg (1 tab) all other days or as directed by coumadin clinic 80 tablet 1     Allergies   Allergen Reactions     Bacitracin      Skin Irritation     Cephalexin Hcl Itching     Clindamycin Hcl Itching     Recent Labs   Lab Test  10/04/18   1253  04/05/18   1224  10/13/17   0948  10/05/17   1415   07/24/13   0801   LDL   --    --    --    --    --   124   HDL   --    --    --    --    --   42*   TRIG   --    --    --    --    --   63   ALT  28  30   --   28   < >   --    CR  0.99  0.88   --   0.88   < >   --    GFRESTIMATED  54*  62   --   62   < >   --    GFRESTBLACK  66  75   --   75   < >   --    POTASSIUM  4.2  3.9   --   4.3   < >   --    TSH   --   7.10*  8.00*   --    < >   --     < > = values in this interval not displayed.      BP Readings from Last 3 Encounters:   10/22/18 120/70   10/04/18 133/64   04/12/18 126/64    Wt Readings from Last 3 Encounters:   10/22/18 198 lb (89.8 kg)   10/04/18 201 lb (91.2 kg)   04/12/18 205 lb (93 kg)                  Labs reviewed in EPIC    Reviewed and updated as needed this visit by clinical staff  Tobacco  Allergies  Meds  Problems  Med Hx  Surg Hx  Fam Hx  Soc Hx        Reviewed and updated as needed this visit by Provider  Allergies  Meds  Problems         ROS:  CONSTITUTIONAL: NEGATIVE for fever, chills, change in weight  ENT/MOUTH: NEGATIVE for ear, mouth and throat problems  RESP: NEGATIVE for significant cough or SOB  CV: NEGATIVE for chest pain, palpitations or peripheral edema  MUSCULOSKELETAL: POSITIVE  for joint pain left knee. No swelling or stiffness.  ROS otherwise negative    OBJECTIVE:     /70 (BP Location: Right arm, Patient Position: Chair, Cuff Size: Adult Large)  Pulse 64  Temp 97.1  F (36.2  C) (Temporal)  Resp 22   Wt 198 lb (89.8 kg)  SpO2 95%  BMI 32.95 kg/m2  Body mass index is 32.95 kg/(m^2).  GENERAL: healthy, alert and no distress  NECK: no adenopathy, no asymmetry, masses, or scars and thyroid normal to palpation  RESP: lungs clear to auscultation - no rales, rhonchi or wheezes  CV: regular rate and rhythm, normal S1 S2, no S3 or S4, no murmur, click or rub, no peripheral edema and peripheral pulses strong  ABDOMEN: soft, nontender, no hepatosplenomegaly, no masses and bowel sounds normal  MS: LLE exam shows normal strength and muscle mass, no deformities and no evidence of joint effusion. Knee exam: left positive for moderate crepitations, some mild tenderness along medial joint line and pain on range of motion, no effusion is present, no pseudolaxity noted.    Diagnostic Test Results:  No results found for this or any previous visit (from the past 24 hour(s)).    ASSESSMENT/PLAN:     1. Congenital hypothyroidism without goiter  Chronic stable weight.  TSH   Date Value Ref Range Status   04/05/2018 7.10 (H) 0.40 - 4.00 mU/L Final   dosage adjusted at that time. Will recheck TSH today.  - TSH with free T4 reflex  - furosemide (LASIX) 20 MG tablet; TAKE 1 TABLET EVERY DAY  Dispense: 90 tablet; Refill: 3    2. Morbid obesity (H)  Chronic, weight down with exercise this past summer. The current medical regimen is effective;  continue present plan and medications.     3. Paroxysmal atrial fibrillation (H)  Chronic stable. She is rate and rhythm controlled and anticoagulated with coumadin. The current medical regimen is effective;  continue present plan and medications.   - furosemide (LASIX) 20 MG tablet; TAKE 1 TABLET EVERY DAY  Dispense: 90 tablet; Refill: 3    4. Hypertension goal BP (blood pressure) < 140/90  Chronic stable. The current medical regimen is effective;  continue present plan and medications.     6. Cardiomyopathy, unspecified type (H)  cardiomyopathy.  Her initial ejection fraction, while in atrial  fibrillation, was 40%-45%.  In 02/2017, a repeat echo shows her normalized ejection fraction of 60%.  She had just a trace of valvular insufficiency and slight elevation of her left ventricular filling pressures.       Amiodarone lab work was completed today.  Her TSH continues to be elevated at 8.94.  She is being treated for hypothyroidism and is on levothyroxine at 100 mcg daily.  Her hepatic function and BMP were all normal.   - carvedilol (COREG) 12.5 MG tablet; TAKE 1 TABLET TWICE DAILY WITH MEALS  Dispense: 180 tablet; Refill: 3    7. Localized edema  Chronic, stable. The current medical regimen is effective;  continue present plan and medications.   - furosemide (LASIX) 20 MG tablet; TAKE 1 TABLET EVERY DAY  Dispense: 90 tablet; Refill: 3    8. Acute systolic heart failure (H)  Her initial ejection fraction, while in atrial fibrillation, was 40%-45%.  In 02/2017, a repeat echo shows her normalized ejection fraction of 60%.  She had just a trace of valvular insufficiency and slight elevation of her left ventricular filling pressures.       Amiodarone lab work was completed today.  Her TSH continues to be elevated at 8.94.  She is being treated for hypothyroidism and is on levothyroxine at 100 mcg daily.  Her hepatic function and BMP were all normal.   - furosemide (LASIX) 20 MG tablet; TAKE 1 TABLET EVERY DAY  Dispense: 90 tablet; Refill: 3    9. Need for prophylactic vaccination and inoculation against influenza    - FLU VACCINE, INCREASED ANTIGEN, PRESV FREE, AGE 65+ [94864]  - ADMIN INFLUENZA (For MEDICARE Patients ONLY) []    FUTURE APPOINTMENTS:       - Follow-up visit in 6 months with new primary care in [place].  SELF MONITORING:       - Please check blood pressure readings daily       - Daily weights  Work on weight loss  Regular exercise    Tej Tilley MD  [facility]    Injectable Influenza Immunization Documentation    1.  Is the person to be vaccinated sick today?   No    2.  Does the person to be vaccinated have an allergy to a component   of the vaccine?   No  Egg Allergy Algorithm Link    3. Has the person to be vaccinated ever had a serious reaction   to influenza vaccine in the past?   No    4. Has the person to be vaccinated ever had Guillain-Barré syndrome?   No    Form completed by Lakesha Mario MA 10/22/2018  11:23 AM

## 2018-10-22 NOTE — NURSING NOTE
Prior to injection verified patient identity using patient's name and date of birth.  Due to injection administration, patient instructed to remain in clinic for 15 minutes  afterwards, and to report any adverse reaction to me immediately.    Lakesha Mario MA 10/22/2018  11:29 AM

## 2018-10-22 NOTE — MR AVS SNAPSHOT
After Visit Summary   10/22/2018    Elvia Amaro    MRN: [id number]           Patient Information     Date Of Birth          1940        Visit Information        Provider Department      10/22/2018 10:40 AM Tej Tilley MD [facility]        Today's Diagnoses     Congenital hypothyroidism without goiter    -  1    Morbid obesity (H)        Paroxysmal atrial fibrillation (H)        Hypertension goal BP (blood pressure) < 140/90        CARDIOVASCULAR SCREENING; LDL GOAL LESS THAN 130        Cardiomyopathy, unspecified type (H)        Localized edema        Acute systolic heart failure (H)        Need for prophylactic vaccination and inoculation against influenza           Follow-ups after your visit        Follow-up notes from your care team     Return in about 6 months (around 4/22/2019) for Establish care with new provider in [place].      Your next 10 appointments already scheduled     Oct 17, 2019 11:30 AM CDT   [facility] Draw with  [facility]RAW   [facility] Draw ([facility])    [address]  [address]  [place] [id number] [phone]            Oct 17, 2019 12:00 PM CDT   MA SCREENING DIGITAL BILATERAL with [id number]   [facility] ([facility])    [address]  [place] [address] [phone]           How do I prepare for my exam? (Food and drink instructions) No Food and Drink Restrictions.  How do I prepare for my exam? (Other instructions) Do not use any powder, lotion or deodorant under your arms or on your breast. If you do, we will ask you to remove it before your exam.  What should I wear: Wear comfortable, two-piece clothing.  How long does the exam take: Most scans will take 15 minutes.  What should I bring: Bring any previous mammograms from other facilities or have them mailed to the breast center.  Do I need a :  No  is  "needed.  What do I need to tell my doctor: If you have any allergies, tell your care team.  What should I do after the exam: No restrictions, You may resume normal activities.  What is this test: This test is an x-ray of the breast to look for breast disease. The breast is pressed between two plates to flatten and spread the tissue. An X-ray is taken of the breast from different angles.  Who should I call with questions: If you have any questions, please call the Imaging Department where you will have your exam. Directions, parking instructions, and other information is available on our website, [email or web address].  Other information about my exam Three-dimensional (3D) mammograms are available at [facility] locations in [facility], [facility] and [place]. [facility] locations include [place] and the [facility] in [place].  Benefits of 3D mammograms include * Improved rate of cancer detection * Decreases your chance of having to go back for more tests, which means fewer: * \"False-positive\" results (This means that there is an abnormal area but it isn't cancer.) * Invasive testing procedures, such as a biopsy or surgery * Can provide clearer images of the breast if you have dense breast tissue.  *3D mammography is an optional exam that anyone can have with a 2D mammogram. It doesn't replace or take the place of a 2D mammogram. 2D mammograms remain an effective screening test for all women.  Not all insurance companies cover the cost of a 3D mammogram. Check with your insurance. Three-dimensional (3D) mammograms are available at [facility] locations in [facility], [facility] and [place]. Health locations include [place] and [facility] in [place]. Benefits of 3D mammograms include: - Improved rate of cancer detection - Decreases your chance of having to go back for more " "tests, which means fewer: - \"False-positive\" results (This means that there is an abnormal area but it isn't cancer.) - Invasive testing procedures, such as a biopsy or surgery - Can provide clearer images of the breast if you have dense breast tissue. 3D mammography is an optional exam that anyone can have with a 2D mammogram. It doesn't replace or take the place of a 2D mammogram. 2D mammograms remain an effective screening test for all women.  Not all insurance companies cover the cost of a 3D mammogram. Check with your insurance.            Oct 17, 2019 12:30 PM CDT   (Arrive by 12:15 PM)   Return Visit with Elijah Padilla MD   [facility] ([facility] [place])    [address]  [address]  [place] [address] [phone]              Who to contact     If you have questions or need follow up information about today's clinic visit or your schedule please contact [facility] directly at [phone].  Normal or non-critical lab and imaging results will be communicated to you by MyChart, letter or phone within 4 business days after the clinic has received the results. If you do not hear from us within 7 days, please contact the clinic through MyChart or phone. If you have a critical or abnormal lab result, we will notify you by phone as soon as possible.  Submit refill requests through [employer] or call your pharmacy and they will forward the refill request to us. Please allow 3 business days for your refill to be completed.          Additional Information About Your Visit        Care EveryWhere ID     This is your Care EveryWhere ID. This could be used by other organizations to access your [facility] medical records  [id number]        Your Vitals Were     Pulse Temperature Respirations Pulse Oximetry BMI (Body Mass Index)       64 97.1  F (36.2  C) (Temporal) 22 95% 32.95 kg/m2        Blood Pressure from Last 3 Encounters:   10/22/18 120/70 "   10/04/18 133/64   04/12/18 126/64    Weight from Last 3 Encounters:   10/22/18 198 lb (89.8 kg)   10/04/18 201 lb (91.2 kg)   04/12/18 205 lb (93 kg)              We Performed the Following     ADMIN INFLUENZA (For MEDICARE Patients ONLY) []     FLU VACCINE, INCREASED ANTIGEN, PRESV FREE, AGE 65+ [63820]     TSH with free T4 reflex          Where to get your medicines      These medications were sent to [facility] - [place], [place] - [address]  [address], [place] [facility] [address]     Phone:  [phone]     carvedilol 12.5 MG tablet    furosemide 20 MG tablet          Primary Care Provider Office Phone # Fax #    Tej Tilley [phone]       [address] [facility] [address]        Equal Access to Services     SEGUN VALLES : Hadii basil carney Solucy, waaxda merrick, qaybta katommy alvarez, enoch florentino . So [facility] [phone].    ATENCIÓN: Si habla español, tiene a good disposición servicios gratuitos de asistencia lingüística. Llame al [phone].    We comply with applicable federal civil rights laws and [place] laws. We do not discriminate on the basis of race, color, national origin, age, disability, sex, sexual orientation, or gender identity.            Thank you!     Thank you for choosing [facility]  for your care. Our goal is always to provide you with excellent care. Hearing back from our patients is one way we can continue to improve our services. Please take a few minutes to complete the written survey that you may receive in the mail after your visit with us. Thank you!             Your Updated Medication List - Protect others around you: Learn how to safely use, store and throw away your medicines at www.disposemymeds.org.          This list is accurate as of 10/22/18 11:50 AM.  Always use your most recent med list.                   Brand Name Dispense Instructions for use Diagnosis     ACE/ARB/ARNI NOT PRESCRIBED (INTENTIONAL)      1 each every 15 minutes ACE & ARB not prescribed due to Symptomatic hypotension not due to excessive diuresis    Atrial fibrillation, unspecified       amiodarone 200 MG tablet    PACERONE/CODARONE    90 tablet    TAKE 1 TABLET EVERY DAY    Atrial fibrillation, unspecified type (H)       CALCIUM + D PO      Take  by mouth.        carvedilol 12.5 MG tablet    COREG    180 tablet    TAKE 1 TABLET TWICE DAILY WITH MEALS    Cardiomyopathy, unspecified type (H)       furosemide 20 MG tablet    LASIX    90 tablet    TAKE 1 TABLET EVERY DAY    Localized edema, Congenital hypothyroidism without goiter, Acute systolic heart failure (H), Paroxysmal atrial fibrillation (H)       levothyroxine 100 MCG tablet    SYNTHROID/LEVOTHROID    90 tablet    Take 1 tablet (100 mcg) by mouth daily    Congenital hypothyroidism without goiter, Localized edema, Acute systolic heart failure (H), Paroxysmal atrial fibrillation (H)       * order for DME     1 Device    Equipment being ordered: Elastic Sleeve  Prosthetics Fax [phone]    Malignant neoplasm of left female breast, unspecified site of breast       * order for DME     1 Device    Equipment being ordered: breast prosthesis and bras    S/P left mastectomy, Malignant neoplasm of left female breast, unspecified site of breast       VITAMIN D-1000 MAX ST PO      Take  by mouth.        warfarin 2.5 MG tablet    COUMADIN    80 tablet    Take 1.25 mg (1/2 tab) Tues, Thurs, Sat and 2.5 mg (1 tab) all other days or as directed by coumadin clinic    Paroxysmal atrial fibrillation (H), Long term current use of anticoagulant therapy       * Notice:  This list has 2 medication(s) that are the same as other medications prescribed for you. Read the directions carefully, and ask your doctor or other care provider to review them with you.

## 2018-10-23 DIAGNOSIS — Z17.1 MALIGNANT NEOPLASM OF UPPER-OUTER QUADRANT OF LEFT BREAST IN FEMALE, ESTROGEN RECEPTOR NEGATIVE (H): ICD-10-CM

## 2018-10-23 DIAGNOSIS — C50.412 MALIGNANT NEOPLASM OF UPPER-OUTER QUADRANT OF LEFT BREAST IN FEMALE, ESTROGEN RECEPTOR NEGATIVE (H): ICD-10-CM

## 2018-10-23 LAB
ALBUMIN SERPL-MCNC: 3.2 G/DL (ref 3.4–5)
ALP SERPL-CCNC: 115 U/L (ref 40–150)
ALT SERPL W P-5'-P-CCNC: 27 U/L (ref 0–50)
ANION GAP SERPL CALCULATED.3IONS-SCNC: 5 MMOL/L (ref 3–14)
AST SERPL W P-5'-P-CCNC: 17 U/L (ref 0–45)
BASOPHILS # BLD AUTO: 0 10E9/L (ref 0–0.2)
BASOPHILS NFR BLD AUTO: 0.4 %
BILIRUB SERPL-MCNC: 0.7 MG/DL (ref 0.2–1.3)
BUN SERPL-MCNC: 11 MG/DL (ref 7–30)
CALCIUM SERPL-MCNC: 8.8 MG/DL (ref 8.5–10.1)
CHLORIDE SERPL-SCNC: 105 MMOL/L (ref 94–109)
CO2 SERPL-SCNC: 29 MMOL/L (ref 20–32)
CREAT SERPL-MCNC: 0.88 MG/DL (ref 0.52–1.04)
DIFFERENTIAL METHOD BLD: ABNORMAL
EOSINOPHIL # BLD AUTO: 0.1 10E9/L (ref 0–0.7)
EOSINOPHIL NFR BLD AUTO: 2.9 %
ERYTHROCYTE [DISTWIDTH] IN BLOOD BY AUTOMATED COUNT: 13.5 % (ref 10–15)
GFR SERPL CREATININE-BSD FRML MDRD: 62 ML/MIN/1.7M2
GLUCOSE SERPL-MCNC: 85 MG/DL (ref 70–99)
HCT VFR BLD AUTO: 42.2 % (ref 35–47)
HGB BLD-MCNC: 13.9 G/DL (ref 11.7–15.7)
LYMPHOCYTES # BLD AUTO: 0.6 10E9/L (ref 0.8–5.3)
LYMPHOCYTES NFR BLD AUTO: 13.2 %
MCH RBC QN AUTO: 31.2 PG (ref 26.5–33)
MCHC RBC AUTO-ENTMCNC: 32.9 G/DL (ref 31.5–36.5)
MCV RBC AUTO: 95 FL (ref 78–100)
MONOCYTES # BLD AUTO: 0.3 10E9/L (ref 0–1.3)
MONOCYTES NFR BLD AUTO: 6.8 %
NEUTROPHILS # BLD AUTO: 3.7 10E9/L (ref 1.6–8.3)
NEUTROPHILS NFR BLD AUTO: 76.7 %
PLATELET # BLD AUTO: 176 10E9/L (ref 150–450)
POTASSIUM SERPL-SCNC: 4.4 MMOL/L (ref 3.4–5.3)
PROT SERPL-MCNC: 6.9 G/DL (ref 6.8–8.8)
RBC # BLD AUTO: 4.46 10E12/L (ref 3.8–5.2)
SODIUM SERPL-SCNC: 139 MMOL/L (ref 133–144)
WBC # BLD AUTO: 4.9 10E9/L (ref 4–11)

## 2018-10-23 PROCEDURE — 80053 COMPREHEN METABOLIC PANEL: CPT | Performed by: INTERNAL MEDICINE

## 2018-10-23 PROCEDURE — 85025 COMPLETE CBC W/AUTO DIFF WBC: CPT | Performed by: INTERNAL MEDICINE

## 2018-10-23 PROCEDURE — 36415 COLL VENOUS BLD VENIPUNCTURE: CPT | Performed by: INTERNAL MEDICINE

## 2018-11-29 ENCOUNTER — ANTICOAGULATION THERAPY VISIT (OUTPATIENT)
Dept: ANTICOAGULATION | Facility: OTHER | Age: 78
End: 2018-11-29

## 2018-11-29 DIAGNOSIS — I48.91 ATRIAL FIBRILLATION, UNSPECIFIED TYPE (H): ICD-10-CM

## 2019-01-16 DIAGNOSIS — I48.0 PAROXYSMAL ATRIAL FIBRILLATION (H): ICD-10-CM

## 2019-01-16 DIAGNOSIS — Z79.01 LONG TERM CURRENT USE OF ANTICOAGULANT THERAPY: ICD-10-CM

## 2019-01-16 RX ORDER — WARFARIN SODIUM 2.5 MG/1
TABLET ORAL
Qty: 80 TABLET | Refills: 1 | Status: SHIPPED | OUTPATIENT
Start: 2019-01-16 | End: 2019-02-05

## 2019-01-16 NOTE — TELEPHONE ENCOUNTER
"warfarin  Last Written Prescription Date:  6/26/2018  Last Fill Quantity: 80,  # refills: 1   Last office visit: 10/22/2018 with prescribing provider:  4/12/2018   Future Office Visit:      Requested Prescriptions   Pending Prescriptions Disp Refills     warfarin (COUMADIN) 2.5 MG tablet [Pharmacy Med Name: WARFARIN SODIUM 2.5 MG Tablet] 80 tablet 1     Sig: TAKE 1.25 MG (1/2 TAB) TUES, THURS, SAT AND 2.5 MG (1 TAB) ALL OTHER DAYS OR AS DIRECTED BY COUMADIN CLINIC    Vitamin K Antagonists Failed - 1/16/2019 10:19 AM       Failed - INR is within goal in the past 6 weeks    Confirm INR is within goal in the past 6 weeks.     Recent Labs   Lab Test 10/11/18   INR 2.0*                      Passed - Recent (12 mo) or future (30 days) visit within the authorizing provider's specialty    Patient had office visit in the last 12 months or has a visit in the next 30 days with authorizing provider or within the authorizing provider's specialty.  See \"Patient Info\" tab in inbasket, or \"Choose Columns\" in Meds & Orders section of the refill encounter.             Passed - Medication is active on med list       Passed - Patient is 18 years of age or older       Passed - Patient is not pregnant       Passed - No positive pregnancy on file in past 12 months        Mary Kerns RN on 1/16/2019 at 10:38 AM    "

## 2019-01-16 NOTE — TELEPHONE ENCOUNTER
Pt has transferred care to a facility in [place] but we are getting the request to have her coumadin refilled.   I spoke with pt who stated that Dr. Tilley said he would refill all her medications for a year so she wants the request to go to him. Since we are no longer managing her INR, I am routing this refill request to Dr. Tilley to refill if he is comfortable with that. If not, patient would like a call back  Manuela Hanley RN

## 2019-01-21 DIAGNOSIS — I48.91 ATRIAL FIBRILLATION, UNSPECIFIED TYPE (H): ICD-10-CM

## 2019-01-21 DIAGNOSIS — I42.9 CARDIOMYOPATHY, UNSPECIFIED TYPE (H): ICD-10-CM

## 2019-01-21 NOTE — TELEPHONE ENCOUNTER
Requested Prescriptions   Pending Prescriptions Disp Refills     amiodarone (PACERONE/CODARONE) 200 MG tablet 90 tablet 3     Sig: TAKE 1 TABLET EVERY DAY    There is no refill protocol information for this order      Every 6 months-  TSH: 10/22/18  LFT:10/23/18  Every 12 months-  EK/10/17  PFT'S:17  Eye exam: None    Called and spoke to patient. Informed that I was calling from the cardiology clinic about a refill received from [employer] for her Amiodarone. She last saw Rachana Dey in [place] on 8/10/17. She had previously followed with Dr Kerr in [place]. She was told to follow up in one year, but states she was told she would be called about a follow up and is still waiting for a call.     Informed her that the refill failed our refill protocols because she has not been seen for over one year, and is in need of follow up testing because of the Amiodarone. She said that they moved to [facility] two months ago. Also, she no longer has [employer], she has [employer]. She said not to refill it with [employer]. She does not know where she planned to follow up cardiology wise. She asked if there were any providers in [place].     She was given the phone number for scheduling at [facility]. She will check with her  about a pharmacy near their home and call back.     DEBORAH Ren

## 2019-01-21 NOTE — TELEPHONE ENCOUNTER
Writer received a refill request from: [facility].     Medication: Amiodarone 200MG  Sig: Take 1 tablet every day  Date last written: 4/12/2018  Dispensed amount: 90  Refills: 3  Date last dispensed: N/A      Pt's last office visit: 2/6/2017  Next scheduled office visit: romel Olvera on 1/21/2019 at 10:25 AM

## 2019-01-23 RX ORDER — CARVEDILOL 12.5 MG/1
TABLET ORAL
Qty: 180 TABLET | Refills: 0 | Status: SHIPPED | OUTPATIENT
Start: 2019-01-23

## 2019-01-23 RX ORDER — AMIODARONE HYDROCHLORIDE 200 MG/1
TABLET ORAL
Qty: 90 TABLET | Refills: 0 | Status: SHIPPED | OUTPATIENT
Start: 2019-01-23

## 2019-01-23 NOTE — TELEPHONE ENCOUNTER
Patient has a appointment with ophthalmology tomorrow and she was instructed to be sure and tell them she is taking Amiodarone. The first opening they had with cardiology was March 14 th in [place]. She has established with a PCP in [place] as seen in [facility].She believes they are part of [facility] but does not remember the name of the clinic.    She is requesting a refill of amiodarone and carvedilol to get her through until her appointment. Her  says they are using [place] [facility] through  [facility] phone# [phone]  ID [id number]  GROUP # [id number]  One chi refill given with a note to her pharmacy that further refills need to come from her new provider in [place].   DEBORAH Ren

## 2019-02-05 DIAGNOSIS — I48.0 PAROXYSMAL ATRIAL FIBRILLATION (H): ICD-10-CM

## 2019-02-05 DIAGNOSIS — R60.0 LOCALIZED EDEMA: ICD-10-CM

## 2019-02-05 DIAGNOSIS — I50.21 ACUTE SYSTOLIC HEART FAILURE (H): ICD-10-CM

## 2019-02-05 DIAGNOSIS — Z79.01 LONG TERM CURRENT USE OF ANTICOAGULANT THERAPY: ICD-10-CM

## 2019-02-05 DIAGNOSIS — I42.9 CARDIOMYOPATHY, UNSPECIFIED TYPE (H): ICD-10-CM

## 2019-02-05 DIAGNOSIS — E03.1 CONGENITAL HYPOTHYROIDISM WITHOUT GOITER: ICD-10-CM

## 2019-02-06 RX ORDER — CARVEDILOL 12.5 MG/1
TABLET ORAL
Qty: 180 TABLET | Refills: 0 | OUTPATIENT
Start: 2019-02-06

## 2019-02-06 RX ORDER — WARFARIN SODIUM 2.5 MG/1
TABLET ORAL
Qty: 80 TABLET | Refills: 0 | Status: SHIPPED | OUTPATIENT
Start: 2019-02-06

## 2019-02-06 RX ORDER — FUROSEMIDE 20 MG
TABLET ORAL
Qty: 90 TABLET | Refills: 2 | Status: SHIPPED | OUTPATIENT
Start: 2019-02-06

## 2019-02-06 RX ORDER — LEVOTHYROXINE SODIUM 100 UG/1
100 TABLET ORAL DAILY
Qty: 90 TABLET | Refills: 3 | OUTPATIENT
Start: 2019-02-06

## 2019-02-06 NOTE — TELEPHONE ENCOUNTER
"Requested Prescriptions   Pending Prescriptions Disp Refills     furosemide (LASIX) 20 MG tablet 90 tablet 3    Last Written Prescription Date:  10/22/18  Last Fill Quantity: 90,  # refills: 3   Last office visit: 10/22/2018 with prescribing provider:     Future Office Visit:     Sig: TAKE 1 TABLET EVERY DAY    Diuretics (Including Combos) Protocol Passed - 2/5/2019 11:06 AM       Passed - Blood pressure under 140/90 in past 12 months    BP Readings from Last 3 Encounters:   10/22/18 120/70   10/04/18 133/64   04/12/18 126/64          Passed - Recent (12 mo) or future (30 days) visit within the authorizing provider's specialty    Patient had office visit in the last 12 months or has a visit in the next 30 days with authorizing provider or within the authorizing provider's specialty.  See \"Patient Info\" tab in inbasket, or \"Choose Columns\" in Meds & Orders section of the refill encounter.           Passed - Medication is active on med list       Passed - Patient is age 18 or older       Passed - No active pregancy on record       Passed - Normal serum creatinine on file in past 12 months    Recent Labs   Lab Test 10/23/18  0753   CR 0.88             Passed - Normal serum potassium on file in past 12 months    Recent Labs   Lab Test 10/23/18  0753   POTASSIUM 4.4             Passed - Normal serum sodium on file in past 12 months    Recent Labs   Lab Test 10/23/18  0753                Passed - No positive pregnancy test in past 12 months   CURRENT REFILLS SENT TO PHARMACY CHANGE PER PATIENT REQUEST           warfarin (COUMADIN) 2.5 MG tablet  Last Written Prescription Date:  1/16/19  Last Fill Quantity: 80,  # refills: 1   Last office visit: 10/22/2018 with prescribing provider:     Future Office Visit:     80 tablet 1    Vitamin K Antagonists Failed - 2/5/2019 11:06 AM       Failed - INR is within goal in the past 6 weeks    Confirm INR is within goal in the past 6 weeks.     Recent Labs   Lab Test 10/11/18 " "  INR 2.0*          Passed - Recent (12 mo) or future (30 days) visit within the authorizing provider's specialty    Patient had office visit in the last 12 months or has a visit in the next 30 days with authorizing provider or within the authorizing provider's specialty.  See \"Patient Info\" tab in inbasket, or \"Choose Columns\" in Meds & Orders section of the refill encounter.             Passed - Medication is active on med list       Passed - Patient is 18 years of age or older       Passed - Patient is not pregnant       Passed - No positive pregnancy on file in past 12 months   CURRENT REFILLS SENT TO PHARMACY CHANGE PER PATIENT REQUEST           carvedilol (COREG) 12.5 MG tablet 180 tablet 0    Last Written Prescription Date:  1/23/19  Last Fill Quantity: 180,  # refills: 0   Last office visit: 10/22/2018 with prescribing provider:     Future Office Visit:     Sig: TAKE 1 TABLET TWICE DAILY WITH MEALS    Beta-Blockers Protocol Passed - 2/5/2019 11:06 AM       Passed - Blood pressure under 140/90 in past 12 months    BP Readings from Last 3 Encounters:   10/22/18 120/70   10/04/18 133/64   04/12/18 126/64          Passed - Patient is age 6 or older       Passed - Recent (12 mo) or future (30 days) visit within the authorizing provider's specialty    Patient had office visit in the last 12 months or has a visit in the next 30 days with authorizing provider or within the authorizing provider's specialty.  See \"Patient Info\" tab in inbasket, or \"Choose Columns\" in Meds & Orders section of the refill encounter.             Passed - Medication is active on med list   DENIED  FURTHER REFILLS FROM NEW PHYSICIAN PER LAST REFILL           levothyroxine (SYNTHROID/LEVOTHROID) 100 MCG tablet 90 tablet 3    Last Written Prescription Date:  4/12/18  Last Fill Quantity: 90,  # refills: 3   Last office visit: 10/22/2018 with prescribing provider:     Future Office Visit:     Sig: Take 1 tablet (100 mcg) by mouth daily    " "Thyroid Protocol Failed - 2/5/2019 11:06 AM       Failed - Normal TSH on file in past 12 months    Recent Labs   Lab Test 10/22/18  1151   TSH 8.89*             Passed - Patient is 12 years or older       Passed - Recent (12 mo) or future (30 days) visit within the authorizing provider's specialty    Patient had office visit in the last 12 months or has a visit in the next 30 days with authorizing provider or within the authorizing provider's specialty.  See \"Patient Info\" tab in inbasket, or \"Choose Columns\" in Meds & Orders section of the refill encounter.             Passed - Medication is active on med list       Passed - No active pregnancy on record    If patient is pregnant or has had a positive pregnancy test, please check TSH.         Passed - No positive pregnancy test in past 12 months    If patient is pregnant or has had a positive pregnancy test, please check TSH.        Denied  REFILLS CURRENT  Loraine Quinonez RN      "

## 2019-02-07 ENCOUNTER — TELEPHONE (OUTPATIENT)
Dept: FAMILY MEDICINE | Facility: OTHER | Age: 79
End: 2019-02-07

## 2019-02-07 DIAGNOSIS — I50.21 ACUTE SYSTOLIC HEART FAILURE (H): ICD-10-CM

## 2019-02-07 DIAGNOSIS — R60.0 LOCALIZED EDEMA: ICD-10-CM

## 2019-02-07 DIAGNOSIS — E03.1 CONGENITAL HYPOTHYROIDISM WITHOUT GOITER: ICD-10-CM

## 2019-02-07 DIAGNOSIS — I48.0 PAROXYSMAL ATRIAL FIBRILLATION (H): ICD-10-CM

## 2019-02-07 RX ORDER — LEVOTHYROXINE SODIUM 100 UG/1
100 TABLET ORAL DAILY
Qty: 90 TABLET | Refills: 3 | Status: SHIPPED | OUTPATIENT
Start: 2019-02-07

## 2019-02-07 NOTE — TELEPHONE ENCOUNTER
Warfarin and Furosemide have been refilled already. RX pending for Levothyroxine, not go to mail order.

## 2019-02-07 NOTE — TELEPHONE ENCOUNTER
Last Written Prescription Date:  4/12/18  Last Fill Quantity: 90,  # refills: 3   Last office visit: 10/22/2018 with prescribing provider:  Tej Tilley   Future Office Visit:      Requested Prescriptions   Pending Prescriptions Disp Refills     levothyroxine (SYNTHROID/LEVOTHROID) 100 MCG tablet 90 tablet 3     Sig: Take 1 tablet (100 mcg) by mouth daily    There is no refill protocol information for this order        Routing refill request to provider for review/approval because:  Drug not on the [facility] [place] protocol     Treva Reilly RN on 2/7/2019 at 9:38 AM

## 2019-02-07 NOTE — TELEPHONE ENCOUNTER
Reason for Call:  Other prescription    Detailed comments: patient states [employer] told her its time for her PCP to renew her medications.  warfarin (COUMADIN) 2.5 MG tablet,  furosemide (LASIX) 20 MG tablet,  levothyroxine (SYNTHROID/LEVOTHROID    Phone Number Patient can be reached at: Home number on file [phone] (home)    Best Time: any     Can we leave a detailed message on this number? YES    Call taken on 2/7/2019 at 9:11 AM by Edith Schulte

## 2019-04-04 ENCOUNTER — ONCOLOGY VISIT (OUTPATIENT)
Dept: ONCOLOGY | Facility: CLINIC | Age: 79
End: 2019-04-04
Attending: INTERNAL MEDICINE
Payer: COMMERCIAL

## 2019-04-04 VITALS
BODY MASS INDEX: 34.02 KG/M2 | OXYGEN SATURATION: 97 % | WEIGHT: 204.2 LBS | DIASTOLIC BLOOD PRESSURE: 58 MMHG | TEMPERATURE: 97.8 F | HEIGHT: 65 IN | SYSTOLIC BLOOD PRESSURE: 116 MMHG | HEART RATE: 60 BPM

## 2019-04-04 DIAGNOSIS — C50.412 MALIGNANT NEOPLASM OF UPPER-OUTER QUADRANT OF LEFT BREAST IN FEMALE, ESTROGEN RECEPTOR NEGATIVE (H): ICD-10-CM

## 2019-04-04 DIAGNOSIS — Z17.1 MALIGNANT NEOPLASM OF UPPER-OUTER QUADRANT OF LEFT BREAST IN FEMALE, ESTROGEN RECEPTOR NEGATIVE (H): Primary | ICD-10-CM

## 2019-04-04 DIAGNOSIS — C50.412 MALIGNANT NEOPLASM OF UPPER-OUTER QUADRANT OF LEFT BREAST IN FEMALE, ESTROGEN RECEPTOR NEGATIVE (H): Primary | ICD-10-CM

## 2019-04-04 DIAGNOSIS — Z12.39 SCREENING BREAST EXAMINATION: ICD-10-CM

## 2019-04-04 DIAGNOSIS — Z17.1 MALIGNANT NEOPLASM OF UPPER-OUTER QUADRANT OF LEFT BREAST IN FEMALE, ESTROGEN RECEPTOR NEGATIVE (H): ICD-10-CM

## 2019-04-04 LAB
ALBUMIN SERPL-MCNC: 3.4 G/DL (ref 3.4–5)
ALP SERPL-CCNC: 124 U/L (ref 40–150)
ALT SERPL W P-5'-P-CCNC: 28 U/L (ref 0–50)
ANION GAP SERPL CALCULATED.3IONS-SCNC: 5 MMOL/L (ref 3–14)
AST SERPL W P-5'-P-CCNC: 23 U/L (ref 0–45)
BASOPHILS # BLD AUTO: 0 10E9/L (ref 0–0.2)
BASOPHILS NFR BLD AUTO: 0.6 %
BILIRUB SERPL-MCNC: 0.4 MG/DL (ref 0.2–1.3)
BUN SERPL-MCNC: 14 MG/DL (ref 7–30)
CALCIUM SERPL-MCNC: 9.7 MG/DL (ref 8.5–10.1)
CHLORIDE SERPL-SCNC: 109 MMOL/L (ref 94–109)
CO2 SERPL-SCNC: 26 MMOL/L (ref 20–32)
CREAT SERPL-MCNC: 0.78 MG/DL (ref 0.52–1.04)
DIFFERENTIAL METHOD BLD: NORMAL
EOSINOPHIL # BLD AUTO: 0.1 10E9/L (ref 0–0.7)
EOSINOPHIL NFR BLD AUTO: 2.7 %
ERYTHROCYTE [DISTWIDTH] IN BLOOD BY AUTOMATED COUNT: 13.5 % (ref 10–15)
GFR SERPL CREATININE-BSD FRML MDRD: 71 ML/MIN/{1.73_M2}
GLUCOSE SERPL-MCNC: 92 MG/DL (ref 70–99)
HCT VFR BLD AUTO: 44.2 % (ref 35–47)
HGB BLD-MCNC: 14 G/DL (ref 11.7–15.7)
IMM GRANULOCYTES # BLD: 0 10E9/L (ref 0–0.4)
IMM GRANULOCYTES NFR BLD: 0.4 %
LYMPHOCYTES # BLD AUTO: 0.9 10E9/L (ref 0.8–5.3)
LYMPHOCYTES NFR BLD AUTO: 17.5 %
MCH RBC QN AUTO: 30.4 PG (ref 26.5–33)
MCHC RBC AUTO-ENTMCNC: 31.7 G/DL (ref 31.5–36.5)
MCV RBC AUTO: 96 FL (ref 78–100)
MONOCYTES # BLD AUTO: 0.3 10E9/L (ref 0–1.3)
MONOCYTES NFR BLD AUTO: 5.5 %
NEUTROPHILS # BLD AUTO: 3.9 10E9/L (ref 1.6–8.3)
NEUTROPHILS NFR BLD AUTO: 73.3 %
NRBC # BLD AUTO: 0 10*3/UL
NRBC BLD AUTO-RTO: 0 /100
PLATELET # BLD AUTO: 174 10E9/L (ref 150–450)
POTASSIUM SERPL-SCNC: 4 MMOL/L (ref 3.4–5.3)
PROT SERPL-MCNC: 7 G/DL (ref 6.8–8.8)
RBC # BLD AUTO: 4.61 10E12/L (ref 3.8–5.2)
SODIUM SERPL-SCNC: 140 MMOL/L (ref 133–144)
WBC # BLD AUTO: 5.3 10E9/L (ref 4–11)

## 2019-04-04 PROCEDURE — 80053 COMPREHEN METABOLIC PANEL: CPT | Performed by: INTERNAL MEDICINE

## 2019-04-04 PROCEDURE — 99213 OFFICE O/P EST LOW 20 MIN: CPT | Mod: ZP | Performed by: INTERNAL MEDICINE

## 2019-04-04 PROCEDURE — G0463 HOSPITAL OUTPT CLINIC VISIT: HCPCS | Mod: ZF

## 2019-04-04 PROCEDURE — 85025 COMPLETE CBC W/AUTO DIFF WBC: CPT | Performed by: INTERNAL MEDICINE

## 2019-04-04 PROCEDURE — 36415 COLL VENOUS BLD VENIPUNCTURE: CPT

## 2019-04-04 ASSESSMENT — MIFFLIN-ST. JEOR: SCORE: 1397.13

## 2019-04-04 ASSESSMENT — PAIN SCALES - GENERAL: PAINLEVEL: NO PAIN (0)

## 2019-04-04 NOTE — LETTER
4/4/2019       RE: Elvia Amaro  [address] [place] [address]     Dear Colleague,    Thank you for referring your patient, Elvia Amaro, to the [facility]. Please see a copy of my visit note below.    Chief Complaint   Patient presents with     Blood Draw     Vitals and blood drawn by LPN. Pt checked into appt.     JIE Friedman LPN    Ms. 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 The presentation was consistent with inflammatory breast cancer. She had a 4-cm mass in the upper outer quadrant of the left breast and some skin erythema as well as axillary lymphadenopathy on the left. Skin biopsy was negative for an infiltrating tumor cells. For neoadjuvant chemotherapy she received 4 cycles of dose-dense AC from October through December 2010 followed by dose-dense Taxol from December 2010 through February 2011. The tumor responded to the therapy with reduction in size of the breast mass and she had a near complete response, which placed her in the favorable RCB-1 category.   She underwent a left mastectomy on 03/21/2011 and had a left axillary lymph node dissection along with a TAHBSO due to an abnormal Pap smear. Pathology of the breast showed a minute focus of 0.2 cm of carcinoma which was grade 3 invasive ductal carcinoma, ER and AR negative, HER2 negative by immunohistochemistry and equivocal by FISH. The margins were negative. All 21 lymph nodes were negative. The uterus pathology showed an endometrial polyp and the ovaries and the fallopian tubes were normal.   Ms. Amaro went on to complete radiation therapy to the left chest wall, which she completed in July 2011. e focus of 0.2 cm of carcinoma which was grade 3 invasive ductal carcinoma, ER and AR negative, HER2 negative by immunohistochemistry and equivocal by FISH. The margins were negative. All 21 lymph nodes were  "negative. The uterus pathology showed an endometrial polyp and the ovaries and the fallopian tubes were normal.     FOLLOWUP NOTE      INTERVAL HISTORY:  Elvia returns to clinic for routine followup following her diagnosis of a left breast, triple-negative breast cancer in 2010.  She is about 9 years from her initial diagnosis.  She has no pain, no fatigue, no depression, no anxiety.  Squamous cell carcinoma on her right calf and basal cell carcinoma left leg anteriorly are without any evidence of recurrence.  She denies any breast masses or tenderness.  No nipple discharge.  She continues on warfarin for atrial fibrillation.  She does have some residual lymphedema in the left arm for which she stopped using her compression sleeve.      REVIEW OF SYSTEMS:  She denies fevers or chills, cough, chest pain, shortness breath, nausea, vomiting, constipation, diarrhea, bone pain, back pain or headache.  The remainder of a 10-point review of systems is negative.         PHYSICAL EXAM:  Vital Signs: /58   Pulse 60   Temp 97.8  F (36.6  C) (Oral)   Ht 1.651 m (5' 5\")   Wt 92.6 kg (204 lb 3.2 oz)   SpO2 97%   BMI 33.98 kg/m     Gen: NAD  Eyes: EOMI, sclera anicteric  HENT      Head: NC/AT     Ears: No external auricular lesions     Nose/sinus: No rhinorrhea or epistaxis     Oral Cavity/Oropharynx: MMM  Breast:  Right breast is without masses. Nipple everted.  Left mastectomy incision is well healed without erythema or masses.  Telangectasia along the left anterior axillary line.   Pulm: Breathing comfortably on room air, CTABL  CV: No visible cyanosis, trace pitting edema in the lower extremities, RRR  Neurologic/MSK/psych: A&Ox3, Gross motor movements against gravity noted in the upper and lower extremities bilaterally, she has a moderate amount of lymphedema   Breast: No masses palpated in the left or right breast.  Evidence of a post-surgical scar in the left breast. No axillary or SCV lymphadenopathy " bilaterally.      RADIOLOGY:  Diagnostic Mammogram (10/4/18): BIRADS1      LABORATORY DATA:    TSH   Date Value Ref Range Status   10/22/2018 8.89 (H) 0.40 - 4.00 mU/L Final     T4 Free   Date Value Ref Range Status   10/22/2018 1.62 (H) 0.76 - 1.46 ng/dL Final     Lab Results   Component Value Date    WBC 5.3 04/04/2019    HGB 14.0 04/04/2019    HCT 44.2 04/04/2019    MCV 96 04/04/2019     04/04/2019     Lab Results   Component Value Date     04/04/2019    POTASSIUM 4.0 04/04/2019    CHLORIDE 109 04/04/2019    CO2 26 04/04/2019    GLC 92 04/04/2019         ASSESSMENT AND PLAN:  Elvia Amaro is a 78 year old woman with:    1. History of inflammatory triple-negative invasive ductal carcinoma of the left breast, stage IIIB, clinical stage [id number].  She was treated with neoadjuvant chemotherapy with 4 cycles of dose-dense AC followed by 4 cycles of dose-dense Taxol.  She went on to have a left mastectomy with clear margins.  There was a residual 0.2 cm focus of carcinoma present which was grade 3.  Left axillary node dissection revealed no lymph node involvement of 21 lymph nodes examined.  The pathologic stage was [id number].  The tumor was RCB1 with a 10% risk of recurrence at 5 years.  She is now out at year 8.  She completed radiation therapy and has had no evidence of recurrence at the 9-year divina.  Her mammogram in 10/2018 showed benign findings.   2. Discussion of diet and exercise.  She is eating a low- saturated fat diet.   She has decreased her physical activity but she has started walking now that is spring.    3. Bone health.  She takes calcium and vitamin D.  She has had normal bone density in the past with the last DEXA being on 03/26/2015.  I think repeating the DEXA at a 5-year interval would be reasonable since the DEXA was normal.   4. Basal cell carcinoma on the left anterior shin and squamous carcinoma of the right calf.  Both were excised and show no evidence of recurrence.   5.  Atrial fibrillation/CHF is being managed by Dr. Kerr.  She is on warfarin as well as amiodarone, Coreg, and furosemide.  6. Hypothyroidism: Currently on levothyroxine for management of hypothyroidism.    7. Followup.  We will see Elvia in followup in our clinic with Dr. Padilla in 6 months with a mammogram and then KELY in 1 year.  Follow up with me 10-3-19 with CBC, CMP and right mammogram.     Thank you for allowing us to participate in this patient's care.  The patient was seen and evaluated by me.  I discussed the patient with the resident and agree with the findings and plan in the note, which was edited by me.    Sincerely,     Elijah Padilla MD    [facility]  [phone].      I spent 20 minutes with the patient more than 50% of which was in counseling and coordination of care.       Again, thank you for allowing me to participate in the care of your patient.      Sincerely,    Elijah Padilla MD

## 2019-04-04 NOTE — PROGRESS NOTES
Chief Complaint   Patient presents with     Blood Draw     Vitals and blood drawn by LPN. Pt checked into [facility].     JIE Friedman LPN

## 2019-04-04 NOTE — PROGRESS NOTES
Ms. 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 The presentation was consistent with inflammatory breast cancer. She had a 4-cm mass in the upper outer quadrant of the left breast and some skin erythema as well as axillary lymphadenopathy on the left. Skin biopsy was negative for an infiltrating tumor cells. For neoadjuvant chemotherapy she received 4 cycles of dose-dense AC from October through December 2010 followed by dose-dense Taxol from December 2010 through February 2011. The tumor responded to the therapy with reduction in size of the breast mass and she had a near complete response, which placed her in the favorable RCB-1 category.   She underwent a left mastectomy on 03/21/2011 and had a left axillary lymph node dissection along with a TAHBSO due to an abnormal Pap smear. Pathology of the breast showed a minute focus of 0.2 cm of carcinoma which was grade 3 invasive ductal carcinoma, ER and CA negative, HER2 negative by immunohistochemistry and equivocal by FISH. The margins were negative. All 21 lymph nodes were negative. The uterus pathology showed an endometrial polyp and the ovaries and the fallopian tubes were normal.   Ms. Amaro went on to complete radiation therapy to the left chest wall, which she completed in July 2011. e focus of 0.2 cm of carcinoma which was grade 3 invasive ductal carcinoma, ER and CA negative, HER2 negative by immunohistochemistry and equivocal by FISH. The margins were negative. All 21 lymph nodes were negative. The uterus pathology showed an endometrial polyp and the ovaries and the fallopian tubes were normal.     FOLLOWUP NOTE      INTERVAL HISTORY:  Elvia returns to clinic for routine followup following her diagnosis of a left breast, triple-negative breast cancer in 2010.  She is about 9 years from her initial diagnosis.  She has no pain, no fatigue, no depression, no anxiety.   "Squamous cell carcinoma on her right calf and basal cell carcinoma left leg anteriorly are without any evidence of recurrence.  She denies any breast masses or tenderness.  No nipple discharge.  She continues on warfarin for atrial fibrillation.  She does have some residual lymphedema in the left arm for which she stopped using her compression sleeve.      REVIEW OF SYSTEMS:  She denies fevers or chills, cough, chest pain, shortness breath, nausea, vomiting, constipation, diarrhea, bone pain, back pain or headache.  The remainder of a 10-point review of systems is negative.         PHYSICAL EXAM:  Vital Signs: /58   Pulse 60   Temp 97.8  F (36.6  C) (Oral)   Ht 1.651 m (5' 5\")   Wt 92.6 kg (204 lb 3.2 oz)   SpO2 97%   BMI 33.98 kg/m    Gen: NAD  Eyes: EOMI, sclera anicteric  HENT      Head: NC/AT     Ears: No external auricular lesions     Nose/sinus: No rhinorrhea or epistaxis     Oral Cavity/Oropharynx: MMM  Breast:  Right breast is without masses. Nipple everted.  Left mastectomy incision is well healed without erythema or masses.  Telangectasia along the left anterior axillary line.   Pulm: Breathing comfortably on room air, CTABL  CV: No visible cyanosis, trace pitting edema in the lower extremities, RRR  Neurologic/MSK/psych: A&Ox3, Gross motor movements against gravity noted in the upper and lower extremities bilaterally, she has a moderate amount of lymphedema   Breast: No masses palpated in the left or right breast.  Evidence of a post-surgical scar in the left breast. No axillary or SCV lymphadenopathy bilaterally.      RADIOLOGY:  Diagnostic Mammogram (10/4/18): BIRADS1      LABORATORY DATA:    TSH   Date Value Ref Range Status   10/22/2018 8.89 (H) 0.40 - 4.00 mU/L Final     T4 Free   Date Value Ref Range Status   10/22/2018 1.62 (H) 0.76 - 1.46 ng/dL Final     Lab Results   Component Value Date    WBC 5.3 04/04/2019    HGB 14.0 04/04/2019    HCT 44.2 04/04/2019    MCV 96 04/04/2019    PLT " 174 04/04/2019     Lab Results   Component Value Date     04/04/2019    POTASSIUM 4.0 04/04/2019    CHLORIDE 109 04/04/2019    CO2 26 04/04/2019    GLC 92 04/04/2019         ASSESSMENT AND PLAN:  Elvia Amaro is a 78 year old woman with:    1. 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  She was treated with neoadjuvant chemotherapy with 4 cycles of dose-dense AC followed by 4 cycles of dose-dense Taxol.  She went on to have a left mastectomy with clear margins.  There was a residual 0.2 cm focus of carcinoma present which was grade 3.  Left axillary node dissection revealed no lymph node involvement of 21 lymph nodes examined.  The pathologic stage was [id number].  The tumor was RCB1 with a 10% risk of recurrence at 5 years.  She is now out at year 8.  She completed radiation therapy and has had no evidence of recurrence at the 9-year divina.  Her mammogram in 10/2018 showed benign findings.   2. Discussion of diet and exercise.  She is eating a low- saturated fat diet.  She has decreased her physical activity but she has started walking now that is spring.    3. Bone health.  She takes calcium and vitamin D.  She has had normal bone density in the past with the last DEXA being on 03/26/2015.  I think repeating the DEXA at a 5-year interval would be reasonable since the DEXA was normal.   4. Basal cell carcinoma on the left anterior shin and squamous carcinoma of the right calf.  Both were excised and show no evidence of recurrence.   5. Atrial fibrillation/CHF is being managed by Dr. Kerr.  She is on warfarin as well as amiodarone, Coreg, and furosemide.  6. Hypothyroidism: Currently on levothyroxine for management of hypothyroidism.    7. Followup.  We will see Elvia in followup in our clinic with Dr. Padilla in 6 months with a mammogram and then KELY in 1 year.  Follow up with me 10-3-19 with CBC, CMP and right mammogram.      Thank you for allowing us to participate in this patient's care.  The patient was seen and evaluated by me.  I discussed the patient with the resident and agree with the findings and plan in the note, which was edited by me.    Sincerely,     Elijah Padilla MD    [facility]  [phone].      I spent 20 minutes with the patient more than 50% of which was in counseling and coordination of care.

## 2019-10-06 NOTE — PROGRESS NOTES
Ms. 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 The presentation was consistent with inflammatory breast cancer. She had a 4-cm mass in the upper outer quadrant of the left breast and some skin erythema as well as axillary lymphadenopathy on the left. Skin biopsy was negative for an infiltrating tumor cells. For neoadjuvant chemotherapy she received 4 cycles of dose-dense AC from October through December 2010 followed by dose-dense Taxol from December 2010 through February 2011. The tumor responded to the therapy with reduction in size of the breast mass and she had a near complete response, which placed her in the favorable RCB-1 category.   She underwent a left mastectomy on 03/21/2011 and had a left axillary lymph node dissection along with a TAHBSO due to an abnormal Pap smear. Pathology of the breast showed a minute focus of 0.2 cm of carcinoma which was grade 3 invasive ductal carcinoma, ER and MD negative, HER2 negative by immunohistochemistry and equivocal by FISH. The margins were negative. All 21 lymph nodes were negative. The uterus pathology showed an endometrial polyp and the ovaries and the fallopian tubes were normal.   Ms. Amaro went on to complete radiation therapy to the left chest wall, which she completed in July 2011. e focus of 0.2 cm of carcinoma which was grade 3 invasive ductal carcinoma, ER and MD negative, HER2 negative by immunohistochemistry and equivocal by FISH. The margins were negative. All 21 lymph nodes were negative. The uterus pathology showed an endometrial polyp and the ovaries and the fallopian tubes were normal.      FOLLOWUP NOTE      Elvia returns to clinic for routine followup.  She has no complaints.  She has had several incidences of skin lesions on her face and on her leg that have been frozen by her dermatologist.  Otherwise, she is doing well.  No pain, no fatigue, no  depression, no anxiety.      REVIEW OF SYSTEMS:  She denies fevers or chills, cough, chest pain, shortness of breath, nausea, vomiting, constipation, diarrhea, bone pain, back pain or headache.  The remainder of a 10 point review of systems is negative.      PHYSICAL EXAMINATION:   VITAL SIGNS:  Blood pressure 134/63, pulse 51, respirations 16, temperature 97.2, O2 sat 98% on room air, height 1.65 m and weight 97.2 kg.   GENERAL:  Elvia appears generally well.  She has no alopecia.   HEENT:  Oropharynx is without lesions.   LYMPH:  There is no palpable cervical, supraclavicular, subclavicular or axillary lymphadenopathy.   BREASTS:  Examination of the right breast reveals no masses.  Examination of the left breast reveals there is some slight fungal infection in the inferior mammary fold.  Left anterior chest wall is without masses.  The incision is well healed without erythema or masses.  Telangiectasias along the left anterior axillary line.   LUNGS:  Clear to percussion and auscultation.   HEART:  Regular rate and rhythm, S1, S2.   ABDOMEN:  Soft and nontender without hepatosplenomegaly.   EXTREMITIES:  Without edema.   PSYCHIATRIC:  Mood and affect are normal.      LABORATORY DATA:  CBC and CMP were within normal limits.      ASSESSMENT AND PLAN:   1.  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  She was treated with neoadjuvant chemotherapy with 4 cycles of dose-dense AC followed by 4 cycles of dose-dense Taxol.  She went on to have a left mastectomy with clear margins.  There was a residual 0.2 cm focus of carcinoma present, which was grade 3.  Left axillary lymph node dissection revealed no lymph node involvement of 21 lymph nodes involved in the pathology examined.  The pathologic stage was [id number].  The tumor was RCB1 with a 10% risk of recurrence at 5 years.  She is now at year  9.  She completed radiation therapy.  She has had no evidence of recurrence at the 9 year divina.  Her mammogram today showed benign findings.   2.  She takes calcium and vitamin D.  She has had normal bone density in the past.   3.  Basal cell carcinoma of the left anterior shin and squamous carcinoma of the right calf.  Both were excised with no evidence of recurrence. Dermatology follow up visit recently with several small lesions that were frozen.   4.  Atrial fibrillation is being managed by Dr. Kerr.  She is on warfarin as well as amiodarone, Coreg and furosemide.   5.  Hypothyroidism.  She is on levothyroxine for management of hypothyroidism.   6.  Followup.  We will see Elvia in followup in our clinic in 1 year. Follow up with me 10-6-20 with a right mammogram, CBC, CMP.     Thank you for allowing us to continue to participate in Elvia Amaro's care.      Elijah Padilla MD       [facility]         I spent 30 minutes with the patient more than 50% of which was in counseling and coordination of care.

## 2019-10-08 ENCOUNTER — ONCOLOGY VISIT (OUTPATIENT)
Dept: ONCOLOGY | Facility: CLINIC | Age: 79
End: 2019-10-08
Attending: INTERNAL MEDICINE
Payer: COMMERCIAL

## 2019-10-08 ENCOUNTER — ANCILLARY PROCEDURE (OUTPATIENT)
Dept: MAMMOGRAPHY | Facility: CLINIC | Age: 79
End: 2019-10-08
Attending: INTERNAL MEDICINE
Payer: COMMERCIAL

## 2019-10-08 ENCOUNTER — APPOINTMENT (OUTPATIENT)
Dept: LAB | Facility: CLINIC | Age: 79
End: 2019-10-08
Attending: INTERNAL MEDICINE
Payer: COMMERCIAL

## 2019-10-08 VITALS
OXYGEN SATURATION: 98 % | WEIGHT: 214.3 LBS | DIASTOLIC BLOOD PRESSURE: 63 MMHG | BODY MASS INDEX: 35.7 KG/M2 | RESPIRATION RATE: 16 BRPM | TEMPERATURE: 97.2 F | HEIGHT: 65 IN | HEART RATE: 51 BPM | SYSTOLIC BLOOD PRESSURE: 134 MMHG

## 2019-10-08 DIAGNOSIS — Z12.39 SCREENING BREAST EXAMINATION: ICD-10-CM

## 2019-10-08 DIAGNOSIS — C50.412 MALIGNANT NEOPLASM OF UPPER-OUTER QUADRANT OF LEFT BREAST IN FEMALE, ESTROGEN RECEPTOR NEGATIVE (H): Primary | ICD-10-CM

## 2019-10-08 DIAGNOSIS — B36.9 SKIN DISEASE, FUNGAL: Primary | ICD-10-CM

## 2019-10-08 DIAGNOSIS — C50.412 MALIGNANT NEOPLASM OF UPPER-OUTER QUADRANT OF LEFT BREAST IN FEMALE, ESTROGEN RECEPTOR NEGATIVE (H): ICD-10-CM

## 2019-10-08 DIAGNOSIS — Z17.1 MALIGNANT NEOPLASM OF UPPER-OUTER QUADRANT OF LEFT BREAST IN FEMALE, ESTROGEN RECEPTOR NEGATIVE (H): ICD-10-CM

## 2019-10-08 DIAGNOSIS — Z17.1 MALIGNANT NEOPLASM OF UPPER-OUTER QUADRANT OF LEFT BREAST IN FEMALE, ESTROGEN RECEPTOR NEGATIVE (H): Primary | ICD-10-CM

## 2019-10-08 LAB
ALBUMIN SERPL-MCNC: 3.4 G/DL (ref 3.4–5)
ALP SERPL-CCNC: 132 U/L (ref 40–150)
ALT SERPL W P-5'-P-CCNC: 30 U/L (ref 0–50)
ANION GAP SERPL CALCULATED.3IONS-SCNC: 7 MMOL/L (ref 3–14)
AST SERPL W P-5'-P-CCNC: 25 U/L (ref 0–45)
BASOPHILS # BLD AUTO: 0 10E9/L (ref 0–0.2)
BASOPHILS NFR BLD AUTO: 0.6 %
BILIRUB SERPL-MCNC: 0.6 MG/DL (ref 0.2–1.3)
BUN SERPL-MCNC: 22 MG/DL (ref 7–30)
CALCIUM SERPL-MCNC: 9.3 MG/DL (ref 8.5–10.1)
CHLORIDE SERPL-SCNC: 107 MMOL/L (ref 94–109)
CO2 SERPL-SCNC: 25 MMOL/L (ref 20–32)
CREAT SERPL-MCNC: 0.77 MG/DL (ref 0.52–1.04)
DIFFERENTIAL METHOD BLD: ABNORMAL
EOSINOPHIL # BLD AUTO: 0.2 10E9/L (ref 0–0.7)
EOSINOPHIL NFR BLD AUTO: 3.2 %
ERYTHROCYTE [DISTWIDTH] IN BLOOD BY AUTOMATED COUNT: 13.3 % (ref 10–15)
GFR SERPL CREATININE-BSD FRML MDRD: 72 ML/MIN/{1.73_M2}
GLUCOSE SERPL-MCNC: 80 MG/DL (ref 70–99)
HCT VFR BLD AUTO: 45.1 % (ref 35–47)
HGB BLD-MCNC: 14.8 G/DL (ref 11.7–15.7)
IMM GRANULOCYTES # BLD: 0 10E9/L (ref 0–0.4)
IMM GRANULOCYTES NFR BLD: 0.2 %
LYMPHOCYTES # BLD AUTO: 1 10E9/L (ref 0.8–5.3)
LYMPHOCYTES NFR BLD AUTO: 19.4 %
MCH RBC QN AUTO: 31.8 PG (ref 26.5–33)
MCHC RBC AUTO-ENTMCNC: 32.8 G/DL (ref 31.5–36.5)
MCV RBC AUTO: 97 FL (ref 78–100)
MONOCYTES # BLD AUTO: 0.4 10E9/L (ref 0–1.3)
MONOCYTES NFR BLD AUTO: 8.8 %
NEUTROPHILS # BLD AUTO: 3.4 10E9/L (ref 1.6–8.3)
NEUTROPHILS NFR BLD AUTO: 67.8 %
NRBC # BLD AUTO: 0 10*3/UL
NRBC BLD AUTO-RTO: 0 /100
PLATELET # BLD AUTO: 142 10E9/L (ref 150–450)
POTASSIUM SERPL-SCNC: 3.6 MMOL/L (ref 3.4–5.3)
PROT SERPL-MCNC: 7 G/DL (ref 6.8–8.8)
RBC # BLD AUTO: 4.66 10E12/L (ref 3.8–5.2)
SODIUM SERPL-SCNC: 139 MMOL/L (ref 133–144)
WBC # BLD AUTO: 5 10E9/L (ref 4–11)

## 2019-10-08 PROCEDURE — 85025 COMPLETE CBC W/AUTO DIFF WBC: CPT | Performed by: INTERNAL MEDICINE

## 2019-10-08 PROCEDURE — 36415 COLL VENOUS BLD VENIPUNCTURE: CPT

## 2019-10-08 PROCEDURE — G0463 HOSPITAL OUTPT CLINIC VISIT: HCPCS | Mod: ZF

## 2019-10-08 PROCEDURE — 80053 COMPREHEN METABOLIC PANEL: CPT | Performed by: INTERNAL MEDICINE

## 2019-10-08 PROCEDURE — 99214 OFFICE O/P EST MOD 30 MIN: CPT | Mod: ZP | Performed by: INTERNAL MEDICINE

## 2019-10-08 RX ORDER — NYSTATIN 100000 U/G
CREAM TOPICAL 2 TIMES DAILY
Qty: 30 G | Refills: 3 | Status: SHIPPED | OUTPATIENT
Start: 2019-10-08

## 2019-10-08 ASSESSMENT — PAIN SCALES - GENERAL: PAINLEVEL: NO PAIN (0)

## 2019-10-08 ASSESSMENT — MIFFLIN-ST. JEOR: SCORE: 1437.94

## 2019-10-08 NOTE — LETTER
10/8/2019       RE: Elvia Amaro  [address] [place] [address]     Dear Colleague,    Thank you for referring your patient, Elvia Amaro, to the [facility]. Please see a copy of my visit note below.    Ms. 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 The presentation was consistent with inflammatory breast cancer. She had a 4-cm mass in the upper outer quadrant of the left breast and some skin erythema as well as axillary lymphadenopathy on the left. Skin biopsy was negative for an infiltrating tumor cells. For neoadjuvant chemotherapy she received 4 cycles of dose-dense AC from October through December 2010 followed by dose-dense Taxol from December 2010 through February 2011. The tumor responded to the therapy with reduction in size of the breast mass and she had a near complete response, which placed her in the favorable RCB-1 category.   She underwent a left mastectomy on 03/21/2011 and had a left axillary lymph node dissection along with a TAHBSO due to an abnormal Pap smear. Pathology of the breast showed a minute focus of 0.2 cm of carcinoma which was grade 3 invasive ductal carcinoma, ER and NC negative, HER2 negative by immunohistochemistry and equivocal by FISH. The margins were negative. All 21 lymph nodes were negative. The uterus pathology showed an endometrial polyp and the ovaries and the fallopian tubes were normal.   Ms. Amaro went on to complete radiation therapy to the left chest wall, which she completed in July 2011. e focus of 0.2 cm of carcinoma which was grade 3 invasive ductal carcinoma, ER and NC negative, HER2 negative by immunohistochemistry and equivocal by FISH. The margins were negative. All 21 lymph nodes were negative. The uterus pathology showed an endometrial polyp and the ovaries and the fallopian tubes were normal.      FOLLOWUP NOTE       Elvia returns to clinic for routine followup.  She has no complaints.  She has had several incidences of skin lesions on her face and on her leg that have been frozen by her dermatologist.  Otherwise, she is doing well.  No pain, no fatigue, no depression, no anxiety.      REVIEW OF SYSTEMS:  She denies fevers or chills, cough, chest pain, shortness of breath, nausea, vomiting, constipation, diarrhea, bone pain, back pain or headache.  The remainder of a 10 point review of systems is negative.      PHYSICAL EXAMINATION:   VITAL SIGNS:  Blood pressure 134/63, pulse 51, respirations 16, temperature 97.2, O2 sat 98% on room air, height 1.65 m and weight 97.2 kg.   GENERAL:  Elvia appears generally well.  She has no alopecia.   HEENT:  Oropharynx is without lesions.   LYMPH:  There is no palpable cervical, supraclavicular, subclavicular or axillary lymphadenopathy.   BREASTS:  Examination of the right breast reveals no masses.  Examination of the left breast reveals there is some slight fungal infection in the inferior mammary fold.  Left anterior chest wall is without masses.  The incision is well healed without erythema or masses.  Telangiectasias along the left anterior axillary line.   LUNGS:  Clear to percussion and auscultation.   HEART:  Regular rate and rhythm, S1, S2.   ABDOMEN:  Soft and nontender without hepatosplenomegaly.   EXTREMITIES:  Without edema.   PSYCHIATRIC:  Mood and affect are normal.      LABORATORY DATA:  CBC and CMP were within normal limits.      ASSESSMENT AND PLAN:   1.  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  She was treated with neoadjuvant chemotherapy with 4 cycles of dose-dense AC followed by 4 cycles of dose-dense Taxol.  She went on to have a left mastectomy with clear margins.  There was a residual 0.2 cm focus of carcinoma present, which was  grade 3.  Left axillary lymph node dissection revealed no lymph node involvement of 21 lymph nodes involved in the pathology examined.  The pathologic stage was [id number].  The tumor was RCB1 with a 10% risk of recurrence at 5 years.  She is now at year 9.  She completed radiation therapy.  She has had no evidence of recurrence at the 7 year divina.  Her mammogram today showed benign findings.   2.  She takes calcium and vitamin D.  She has had normal bone density in the past.   3.  Basal cell carcinoma of the left anterior shin and squamous carcinoma of the right calf.  Both were excised with no evidence of recurrence. Dermatology follow up visit recently with several small lesions that were frozen.   4.  Atrial fibrillation is being managed by Dr. Kerr.  She is on warfarin as well as amiodarone, Coreg and furosemide.   5.  Hypothyroidism.  She is on levothyroxine for management of hypothyroidism.   6.  Followup.  We will see Elvia in followup in our clinic in 1 year. Follow up with me 10-6-20 with a right mammogram, CBC, CMP.     Thank you for allowing us to continue to participate in Elvia Amaro's care.      Elijah Padilla MD       [facility]     I spent 30 minutes with the patient more than 50% of which was in counseling and coordination of care.

## 2019-10-08 NOTE — NURSING NOTE
Chief Complaint   Patient presents with     Blood Draw     Labs drawn via  by RN in lab. VS taken. Patient checked in for next appt.     Labs collected from venipuncture by RN. Vitals taken. Checked in for appointment.    Martha Mathew RN

## 2019-10-08 NOTE — NURSING NOTE
"Oncology Rooming Note    October 8, 2019 10:47 AM   Elvia Amaro is a 79 year old female who presents for:    Chief Complaint   Patient presents with     Blood Draw     Labs drawn via  by RN in lab. VS taken. Patient checked in for next appt.     Oncology Clinic Visit     RETURN VISIT; BREAST CANCER FOLLOW UP      Initial Vitals: /63 (BP Location: Right arm, Patient Position: Sitting, Cuff Size: Adult Large)   Pulse 51   Temp 97.2  F (36.2  C) (Oral)   Resp 16   Ht 1.651 m (5' 5\")   Wt 97.2 kg (214 lb 4.8 oz)   SpO2 98%   BMI 35.66 kg/m   Estimated body mass index is 35.66 kg/m  as calculated from the following:    Height as of this encounter: 1.651 m (5' 5\").    Weight as of this encounter: 97.2 kg (214 lb 4.8 oz). Body surface area is 2.11 meters squared.  No Pain (0) Comment: Data Unavailable   No LMP recorded. Patient is postmenopausal.  Allergies reviewed: Yes  Medications reviewed: Yes    Medications: Medication refills not needed today.  Pharmacy name entered into EPIC:    [id number] - [place], [place] - [address] [place] [address] PHARMACY MAIL DELIVERY [facility] [address] [id number] [place] [place] [facility] [address] [facility] - [address]    Clinical concerns: No new concerns today  Dr Padilla was notified.      Delmi Arshad              "

## 2020-10-05 NOTE — PROGRESS NOTES
"Elvia Amaro is a 80 year old female who is being evaluated via a billable telephone visit.      The patient has been notified of following:     \"This telephone visit will be conducted via a call between you and your physician/provider. We have found that certain health care needs can be provided without the need for a physical exam.  This service lets us provide the care you need with a short phone conversation.  If a prescription is necessary we can send it directly to your pharmacy.  If lab work is needed we can place an order for that and you can then stop by our lab to have the test done at a later time.    Telephone visits are billed at different rates depending on your insurance coverage. During this emergency period, for some insurers they may be billed the same as an in-person visit.  Please reach out to your insurance provider with any questions.    If during the course of the call the physician/provider feels a telephone visit is not appropriate, you will not be charged for this service.\"    Patient has given verbal consent for Telephone visit?  Yes    What phone number would you like to be contacted at? [phone]    How would you like to obtain your AVS? Mail a copy     I have reviewed and updated the patient's allergies and medication list. Patient was asked to provide any patient recorded vital signs, height and/or weight.  Please see in \"Patient Reported Vital Signs\" tab information.        Concerns: Patient has no new concerns.      Refills: None           DWAYNE Donaldson          Phone call duration: 6 minutes                Ms. Amaro is a 80 year old woman with a history of triple-negative infiltrating ductal carcinoma of the left breast diagnosed in 10/2010. The presentation was consistent with inflammatory breast cancer. She had a 4-cm mass in the upper outer quadrant of the left breast and some skin erythema as well as axillary lymphadenopathy on the left. Skin biopsy was negative " for an infiltrating tumor cells. For neoadjuvant chemotherapy she received 4 cycles of dose-dense AC from October through December 2010 followed by dose-dense Taxol from December 2010 through February 2011. The tumor responded to the therapy with reduction in size of the breast mass and she had a near complete response, which placed her in the favorable RCB-1 category.   She underwent a left mastectomy on 03/21/2011 and had a left axillary lymph node dissection along with a TAHBSO due to an abnormal Pap smear. Pathology of the breast showed a minute focus of 0.2 cm of carcinoma which was grade 3 invasive ductal carcinoma, ER and HI negative, HER2 negative by immunohistochemistry and equivocal by FISH. The margins were negative. All 21 lymph nodes were negative. The uterus pathology showed an endometrial polyp and the ovaries and the fallopian tubes were normal.   Ms. Amaro went on to complete radiation therapy to the left chest wall, which she completed in July 2011. e focus of 0.2 cm of carcinoma which was grade 3 invasive ductal carcinoma, ER and HI negative, HER2 negative by immunohistochemistry and equivocal by FISH. The margins were negative. All 21 lymph nodes were negative. The uterus pathology showed an endometrial polyp and the ovaries and the fallopian tubes were normal.      FOLLOWUP NOTE      Elvia returns to clinic for routine followup.  She has no complaints.  She has had several incidences of skin lesions on her face and on her leg that have been frozen by her dermatologist.  Otherwise, she is doing well.  No pain, no fatigue, no depression, no anxiety.      REVIEW OF SYSTEMS:  She denies fevers or chills, cough, chest pain, shortness of breath, nausea, vomiting, constipation, diarrhea, bone pain, back pain or headache.  The remainder of a 10 point review of systems is negative.      PHYSICAL EXAMINATION:        LABORATORY DATA:  CBC and CMP were within normal limits.     Examination: Rightdigital  diagnostic mammography and digital breast  tomosynthesis with computer aided detection.     Comparison: 10/8/2019, 10/4/2018, 10/5/2017, 10/6/2016.     History/Family history: History of left breast cancer, post mastectomy  in 2011.     Technique:  Tomosynthesis     BREAST DENSITY: Almost entirely fat     Findings: No suspicious findings. No significant interval change                                                                      IMPRESSION: BI-RADS CATEGORY: 1 -  NEGATIVE.     RECOMMENDED FOLLOW-UP: Annual Mammography.     The patient was given the results of the examination.     MILLICENT URBINA MD     ASSESSMENT AND PLAN:   1.  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  She was treated with neoadjuvant chemotherapy with 4 cycles of dose-dense AC followed by 4 cycles of dose-dense Taxol.  She went on to have a left mastectomy with clear margins.  There was a residual 0.2 cm focus of carcinoma present, which was grade 3.  Left axillary lymph node dissection revealed no lymph node involvement of 21 lymph nodes involved in the pathology examined.  The pathologic stage was [id number].  The tumor was RCB1 with a 10% risk of recurrence at 5 years.  She is now at year 9.  She completed radiation therapy.  She has had no evidence of recurrence at the 9 year divina.  Her mammogram today showed benign findings.   2.  She takes calcium and vitamin D.  She has had normal bone density in the past.   3.  Basal cell carcinoma of the left anterior shin and squamous carcinoma of the right calf.  Both were excised with no evidence of recurrence. Dermatology follow up visit recently with several small lesions that were frozen.    4.  Atrial fibrillation is being managed by Dr. Garces in [facility].  She is on warfarin as well as amiodarone, Coreg and furosemide.   5.  Hypothyroidism.  She is on  levothyroxine for management of hypothyroidism.   6.  Followup.  We will see Elvia in followup in our clinic in 1 year. Follow up with me 10-6-20 with a right mammogram, CBC, CMP.  Follow up will be transferred to [facility] in [place] with Dr. Shauna Keenan.  Alejandrina will help send records.      Thank you for allowing us to continue to participate in Elvia Amaor's care.      Elijah Padilla MD       [facility]          12:10-12-16  I spent 6 minutes with the patient more than 50% of which was in counseling and coordination of care.

## 2020-10-06 ENCOUNTER — VIRTUAL VISIT (OUTPATIENT)
Dept: ONCOLOGY | Facility: CLINIC | Age: 80
End: 2020-10-06
Attending: INTERNAL MEDICINE
Payer: COMMERCIAL

## 2020-10-06 ENCOUNTER — ANCILLARY PROCEDURE (OUTPATIENT)
Dept: MAMMOGRAPHY | Facility: CLINIC | Age: 80
End: 2020-10-06
Attending: INTERNAL MEDICINE
Payer: COMMERCIAL

## 2020-10-06 VITALS
HEART RATE: 51 BPM | BODY MASS INDEX: 35.86 KG/M2 | TEMPERATURE: 98 F | DIASTOLIC BLOOD PRESSURE: 67 MMHG | RESPIRATION RATE: 20 BRPM | WEIGHT: 215.5 LBS | SYSTOLIC BLOOD PRESSURE: 146 MMHG | OXYGEN SATURATION: 95 %

## 2020-10-06 DIAGNOSIS — C50.412 MALIGNANT NEOPLASM OF UPPER-OUTER QUADRANT OF LEFT BREAST IN FEMALE, ESTROGEN RECEPTOR NEGATIVE (H): ICD-10-CM

## 2020-10-06 DIAGNOSIS — Z85.3 HX OF BREAST CANCER: ICD-10-CM

## 2020-10-06 DIAGNOSIS — Z17.1 MALIGNANT NEOPLASM OF UPPER-OUTER QUADRANT OF LEFT BREAST IN FEMALE, ESTROGEN RECEPTOR NEGATIVE (H): ICD-10-CM

## 2020-10-06 DIAGNOSIS — Z17.1 MALIGNANT NEOPLASM OF UPPER-OUTER QUADRANT OF LEFT BREAST IN FEMALE, ESTROGEN RECEPTOR NEGATIVE (H): Primary | ICD-10-CM

## 2020-10-06 DIAGNOSIS — C50.412 MALIGNANT NEOPLASM OF UPPER-OUTER QUADRANT OF LEFT BREAST IN FEMALE, ESTROGEN RECEPTOR NEGATIVE (H): Primary | ICD-10-CM

## 2020-10-06 LAB
ALBUMIN SERPL-MCNC: 3.4 G/DL (ref 3.4–5)
ALP SERPL-CCNC: 143 U/L (ref 40–150)
ALT SERPL W P-5'-P-CCNC: 25 U/L (ref 0–50)
ANION GAP SERPL CALCULATED.3IONS-SCNC: 5 MMOL/L (ref 3–14)
AST SERPL W P-5'-P-CCNC: 18 U/L (ref 0–45)
BASOPHILS # BLD AUTO: 0 10E9/L (ref 0–0.2)
BASOPHILS NFR BLD AUTO: 0.5 %
BILIRUB SERPL-MCNC: 0.7 MG/DL (ref 0.2–1.3)
BUN SERPL-MCNC: 13 MG/DL (ref 7–30)
CALCIUM SERPL-MCNC: 9.4 MG/DL (ref 8.5–10.1)
CHLORIDE SERPL-SCNC: 104 MMOL/L (ref 94–109)
CO2 SERPL-SCNC: 28 MMOL/L (ref 20–32)
CREAT SERPL-MCNC: 0.85 MG/DL (ref 0.52–1.04)
DIFFERENTIAL METHOD BLD: NORMAL
EOSINOPHIL # BLD AUTO: 0.1 10E9/L (ref 0–0.7)
EOSINOPHIL NFR BLD AUTO: 2.9 %
ERYTHROCYTE [DISTWIDTH] IN BLOOD BY AUTOMATED COUNT: 13.1 % (ref 10–15)
GFR SERPL CREATININE-BSD FRML MDRD: 64 ML/MIN/{1.73_M2}
GLUCOSE SERPL-MCNC: 71 MG/DL (ref 70–99)
HCT VFR BLD AUTO: 46.2 % (ref 35–47)
HGB BLD-MCNC: 15.2 G/DL (ref 11.7–15.7)
IMM GRANULOCYTES # BLD: 0 10E9/L (ref 0–0.4)
IMM GRANULOCYTES NFR BLD: 0.2 %
LYMPHOCYTES # BLD AUTO: 0.9 10E9/L (ref 0.8–5.3)
LYMPHOCYTES NFR BLD AUTO: 21.1 %
MCH RBC QN AUTO: 30.7 PG (ref 26.5–33)
MCHC RBC AUTO-ENTMCNC: 32.9 G/DL (ref 31.5–36.5)
MCV RBC AUTO: 93 FL (ref 78–100)
MONOCYTES # BLD AUTO: 0.4 10E9/L (ref 0–1.3)
MONOCYTES NFR BLD AUTO: 9.5 %
NEUTROPHILS # BLD AUTO: 2.7 10E9/L (ref 1.6–8.3)
NEUTROPHILS NFR BLD AUTO: 65.8 %
NRBC # BLD AUTO: 0 10*3/UL
NRBC BLD AUTO-RTO: 0 /100
PLATELET # BLD AUTO: 161 10E9/L (ref 150–450)
POTASSIUM SERPL-SCNC: 4 MMOL/L (ref 3.4–5.3)
PROT SERPL-MCNC: 7 G/DL (ref 6.8–8.8)
RBC # BLD AUTO: 4.95 10E12/L (ref 3.8–5.2)
SODIUM SERPL-SCNC: 137 MMOL/L (ref 133–144)
WBC # BLD AUTO: 4.1 10E9/L (ref 4–11)

## 2020-10-06 PROCEDURE — 99213 OFFICE O/P EST LOW 20 MIN: CPT | Mod: 95 | Performed by: INTERNAL MEDICINE

## 2020-10-06 PROCEDURE — G0279 TOMOSYNTHESIS, MAMMO: HCPCS

## 2020-10-06 PROCEDURE — 85025 COMPLETE CBC W/AUTO DIFF WBC: CPT | Performed by: PATHOLOGY

## 2020-10-06 PROCEDURE — 77065 DX MAMMO INCL CAD UNI: CPT | Mod: RT | Performed by: RADIOLOGY

## 2020-10-06 PROCEDURE — 80053 COMPREHEN METABOLIC PANEL: CPT | Performed by: PATHOLOGY

## 2020-10-06 PROCEDURE — 36415 COLL VENOUS BLD VENIPUNCTURE: CPT

## 2020-10-06 PROCEDURE — 999N001193 HC VIDEO/TELEPHONE VISIT; NO CHARGE

## 2020-10-06 ASSESSMENT — PAIN SCALES - GENERAL: PAINLEVEL: NO PAIN (0)

## 2020-10-06 NOTE — LETTER
"    10/6/2020         RE: Elvia Amaro  [address] [place] [address]        Dear Colleague,    Thank you for referring your patient, Elvia Amaro, to the [facility]. Please see a copy of my visit note below.    Elvia Amaro is a 80 year old female who is being evaluated via a billable telephone visit.      The patient has been notified of following:     \"This telephone visit will be conducted via a call between you and your physician/provider. We have found that certain health care needs can be provided without the need for a physical exam.  This service lets us provide the care you need with a short phone conversation.  If a prescription is necessary we can send it directly to your pharmacy.  If lab work is needed we can place an order for that and you can then stop by our lab to have the test done at a later time.    Telephone visits are billed at different rates depending on your insurance coverage. During this emergency period, for some insurers they may be billed the same as an in-person visit.  Please reach out to your insurance provider with any questions.    If during the course of the call the physician/provider feels a telephone visit is not appropriate, you will not be charged for this service.\"    Patient has given verbal consent for Telephone visit?  Yes    What phone number would you like to be contacted at? [phone]    How would you like to obtain your AVS? Mail a copy     I have reviewed and updated the patient's allergies and medication list. Patient was asked to provide any patient recorded vital signs, height and/or weight.  Please see in \"Patient Reported Vital Signs\" tab information.        Concerns: Patient has no new concerns.      Refills: None           DWAYNE Donaldson          Phone call duration: 6 minutes                Ms. 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 The presentation was consistent with inflammatory breast cancer. She had a 4-cm mass in the upper outer quadrant of the left breast and some skin erythema as well as axillary lymphadenopathy on the left. Skin biopsy was negative for an infiltrating tumor cells. For neoadjuvant chemotherapy she received 4 cycles of dose-dense AC from October through December 2010 followed by dose-dense Taxol from December 2010 through February 2011. The tumor responded to the therapy with reduction in size of the breast mass and she had a near complete response, which placed her in the favorable RCB-1 category.   She underwent a left mastectomy on 03/21/2011 and had a left axillary lymph node dissection along with a TAHBSO due to an abnormal Pap smear. Pathology of the breast showed a minute focus of 0.2 cm of carcinoma which was grade 3 invasive ductal carcinoma, ER and ND negative, HER2 negative by immunohistochemistry and equivocal by FISH. The margins were negative. All 21 lymph nodes were negative. The uterus pathology showed an endometrial polyp and the ovaries and the fallopian tubes were normal.   Ms. Amaro went on to complete radiation therapy to the left chest wall, which she completed in July 2011. e focus of 0.2 cm of carcinoma which was grade 3 invasive ductal carcinoma, ER and ND negative, HER2 negative by immunohistochemistry and equivocal by FISH. The margins were negative. All 21 lymph nodes were negative. The uterus pathology showed an endometrial polyp and the ovaries and the fallopian tubes were normal.      FOLLOWUP NOTE      Elvia returns to clinic for routine followup.  She has no complaints.  She has had several incidences of skin lesions on her face and on her leg that have been frozen by her dermatologist.  Otherwise, she is doing well.  No pain, no fatigue, no depression, no anxiety.      REVIEW OF SYSTEMS:  She denies fevers or chills, cough, chest pain, shortness of  breath, nausea, vomiting, constipation, diarrhea, bone pain, back pain or headache.  The remainder of a 10 point review of systems is negative.      PHYSICAL EXAMINATION:        LABORATORY DATA:  CBC and CMP were within normal limits.     Examination: Rightdigital diagnostic mammography and digital breast  tomosynthesis with computer aided detection.     Comparison: 10/8/2019, 10/4/2018, 10/5/2017, 10/6/2016.     History/Family history: History of left breast cancer, post mastectomy  in 2011.     Technique:  Tomosynthesis     BREAST DENSITY: Almost entirely fat     Findings: No suspicious findings. No significant interval change                                                                      IMPRESSION: BI-RADS CATEGORY: 1 -  NEGATIVE.     RECOMMENDED FOLLOW-UP: Annual Mammography.     The patient was given the results of the examination.     MILLICENT URBINA MD     ASSESSMENT AND PLAN:   1.  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  She was treated with neoadjuvant chemotherapy with 4 cycles of dose-dense AC followed by 4 cycles of dose-dense Taxol.  She went on to have a left mastectomy with clear margins.  There was a residual 0.2 cm focus of carcinoma present, which was grade 3.  Left axillary lymph node dissection revealed no lymph node involvement of 21 lymph nodes involved in the pathology examined.  The pathologic stage was [id number].  The tumor was RCB1 with a 10% risk of recurrence at 5 years.  She is now at year 9.  She completed radiation therapy.  She has had no evidence of recurrence at the 9 year divina.  Her mammogram today showed benign findings.   2.  She takes calcium and vitamin D.  She has had normal bone density in the past.   3.  Basal cell carcinoma of the left anterior shin and squamous carcinoma of the right calf.  Both were excised with no evidence of  recurrence. Dermatology follow up visit recently with several small lesions that were frozen.    4.  Atrial fibrillation is being managed by Dr. Garces in [facility].  She is on warfarin as well as amiodarone, Coreg and furosemide.   5.  Hypothyroidism.  She is on levothyroxine for management of hypothyroidism.   6.  Followup.  We will see Elvia in followup in our clinic in 1 year. Follow up with me 10-6-20 with a right mammogram, CBC, CMP.  Follow up will be transferred to [facility] in [place] with Dr. Shauna Keenan.  Alejandrina will help send records.      Thank you for allowing us to continue to participate in Elvia Amaro's care.      Elijah Padilla MD       [facility]          12:10-12-16  I spent 6 minutes with the patient more than 50% of which was in counseling and coordination of care.       Again, thank you for allowing me to participate in the care of your patient.        Sincerely,        Elijah Padilla MD
